# Patient Record
Sex: FEMALE | Race: WHITE | NOT HISPANIC OR LATINO | Employment: OTHER | URBAN - NONMETROPOLITAN AREA
[De-identification: names, ages, dates, MRNs, and addresses within clinical notes are randomized per-mention and may not be internally consistent; named-entity substitution may affect disease eponyms.]

---

## 2017-02-16 ENCOUNTER — OFFICE VISIT (OUTPATIENT)
Dept: CARDIOLOGY | Facility: CLINIC | Age: 77
End: 2017-02-16

## 2017-02-16 VITALS
HEART RATE: 68 BPM | SYSTOLIC BLOOD PRESSURE: 120 MMHG | HEIGHT: 61 IN | WEIGHT: 115 LBS | DIASTOLIC BLOOD PRESSURE: 52 MMHG | BODY MASS INDEX: 21.71 KG/M2

## 2017-02-16 DIAGNOSIS — R01.1 MURMUR, CARDIAC: Primary | ICD-10-CM

## 2017-02-16 DIAGNOSIS — R06.02 SHORTNESS OF BREATH: ICD-10-CM

## 2017-02-16 DIAGNOSIS — I35.0 NONRHEUMATIC AORTIC VALVE STENOSIS: ICD-10-CM

## 2017-02-16 DIAGNOSIS — I44.0 FIRST DEGREE AV BLOCK: ICD-10-CM

## 2017-02-16 DIAGNOSIS — R42 DIZZINESS: ICD-10-CM

## 2017-02-16 DIAGNOSIS — I47.1 PAROXYSMAL SVT (SUPRAVENTRICULAR TACHYCARDIA) (HCC): ICD-10-CM

## 2017-02-16 DIAGNOSIS — Z79.899 LONG TERM CURRENT USE OF ANTIARRHYTHMIC MEDICAL THERAPY: ICD-10-CM

## 2017-02-16 PROCEDURE — 99214 OFFICE O/P EST MOD 30 MIN: CPT | Performed by: NURSE PRACTITIONER

## 2017-02-16 PROCEDURE — 93000 ELECTROCARDIOGRAM COMPLETE: CPT | Performed by: NURSE PRACTITIONER

## 2017-02-16 RX ORDER — RISPERIDONE 3 MG/1
3 TABLET ORAL NIGHTLY
COMMUNITY
End: 2019-01-22 | Stop reason: DRUGHIGH

## 2017-02-16 RX ORDER — OMEGA-3 FATTY ACIDS/FISH OIL 300-1000MG
1 CAPSULE ORAL 2 TIMES DAILY
COMMUNITY

## 2017-02-16 NOTE — PROGRESS NOTES
Chief Complaint   Patient presents with   • Follow-up     Denies chest pain or palpitations. States shes not sure if she has had shortness of breath. See tel encounter- patient states she has not had anymore falls since that time. PCP refills meds. Labs per PCP last month.        Subjective       Debbie Rodrigez is a 76 y.o. female has history of aortic stenosis, history of SVT is now being managed medically. Her last echocardiogram which was done about a year ago showed moderate aortic stenosis, moderate aortic regurgitation. Today she comes to the office for a follow up appointment and denies chest pain or palpitations. She does admit to shortness of breath and mild dizziness, seems to be worse since her last office visit.     HPI         Cardiac History:    Past Surgical History   Procedure Laterality Date   • Hysterectomy     • Appendectomy     •  section     • Eye surgery Bilateral    • Cath lab procedure  2002     Cath- EF 30% Normal Coronaries   • Converted (historical) holter  10/10/2002     Holter- AVG HR 70 BPM   • Cardiovascular stress test  2003     P. Myoview- Normal. Pt had CP   • Echo - converted  09/15/2003     Echo- ([facility]) EF 55%. LBBB. RVSP- 42 mmHg   • Cardiovascular stress test  2013     L. Myoview- Pt had CP. Negative   • Echo - converted  2013     Echo- EF 60%. BRIE- 1.6 mmHg. RVSP- 42 mmHg   • Bigg  2013     BIGG- < 35%, Moderate AS. AI and MR. No Thrombus   • Echo - converted  10/15/2013     Echo-EF 35%, mod. MR, & AI. BRIE- 1.4 Cm2. RVSP- 40 mmHg   • Cardiovascular studies - converted  2014     MUGA- EF 45%   • Echo - converted  2015     Echo- EF 45-50%, mod MR, Mod AS, BRIE 1.1 Cm2, Mod AR, RVSP- 35 mmHg       Current Outpatient Prescriptions   Medication Sig Dispense Refill   • aspirin 81 MG EC tablet Take 81 mg by mouth daily.     • benztropine (COGENTIN) 1 MG tablet Take 1 mg by mouth 2 (two) times a day.     • calcium carbonate (OS-PABLO) 600 MG  tablet Take 600 mg by mouth daily.     • carvedilol (COREG) 6.25 MG tablet Take 6.25 mg by mouth 2 (two) times a day with meals.     • flecainide (TAMBOCOR) 50 MG tablet Take 50 mg by mouth every 12 (twelve) hours.     • furosemide (LASIX) 20 MG tablet Take 20 mg by mouth daily.     • ipratropium (ATROVENT) 0.03 % nasal spray 4 sprays into each nostril 2 (two) times a day.     • levothyroxine (SYNTHROID, LEVOTHROID) 88 MCG tablet Take 88 mcg by mouth daily.     • lisinopril (PRINIVIL,ZESTRIL) 2.5 MG tablet Take 2.5 mg by mouth daily.     • Multiple Vitamin (MULTI VITAMIN DAILY PO) Take  by mouth.     • Omega 3 1000 MG capsule Take  by mouth 2 (Two) Times a Day.     • potassium chloride (K-DUR,KLOR-CON) 10 MEQ CR tablet Take 10 mEq by mouth daily.     • pravastatin (PRAVACHOL) 40 MG tablet Take 40 mg by mouth daily.     • risperiDONE (risperDAL) 3 MG tablet Take 3 mg by mouth Every Night.     • Magnesium Oxide -Mg Supplement 400 MG capsule Once a day 30 capsule 8     No current facility-administered medications for this visit.        Penicillins and Sulfa antibiotics    Past Medical History   Diagnosis Date   • Bipolar affective disorder    • Diabetes mellitus    • CLAYTON (generalized anxiety disorder)    • H/O dilation and curettage    • H/O eye surgery      Eye surgery- bilateral   • H/O:     • H/O: hysterectomy      s/p appendectomy   • Hallucinations    • History of AAA (abdominal aortic aneurysm) repair      (pt doesn't recall- on PCP note)   • Hyperlipidemia    • Hypertension    • Hypothyroidism    • Overactive bladder    • SVT (supraventricular tachycardia)        Social History     Social History   • Marital status:      Spouse name: N/A   • Number of children: N/A   • Years of education: N/A     Occupational History   • Not on file.     Social History Main Topics   • Smoking status: Never Smoker   • Smokeless tobacco: Never Used   • Alcohol use No   • Drug use: No   • Sexual activity: Not on  "file     Other Topics Concern   • Not on file     Social History Narrative       Family History   Problem Relation Age of Onset   • Heart disease Mother    • Cancer Father      Bone       Review of Systems   HENT: Negative.    Respiratory: Positive for shortness of breath.    Cardiovascular: Negative for chest pain, palpitations and leg swelling.   Gastrointestinal: Negative.    Genitourinary: Negative.    Musculoskeletal: Negative.    Neurological: Positive for dizziness and weakness. Negative for syncope, speech difficulty, numbness and headaches.   Psychiatric/Behavioral: Positive for sleep disturbance. The patient is nervous/anxious.        Diabetes- No  Thyroid-abnormal    Objective     Visit Vitals   • /52   • Pulse 68   • Ht 61\" (154.9 cm)   • Wt 115 lb (52.2 kg)   • BMI 21.73 kg/m2       Physical Exam   Constitutional: She is oriented to person, place, and time. Vital signs are normal.   Eyes: Pupils are equal, round, and reactive to light.   Neck: Neck supple.   Cardiovascular: Normal rate, regular rhythm, S1 normal and normal pulses.    Murmur heard.   Crescendo systolic murmur is present with a grade of 4/6   Loud S2   Pulmonary/Chest: Effort normal and breath sounds normal. She has no rales.   Abdominal: Soft. Bowel sounds are normal.   Neurological: She is alert and oriented to person, place, and time.   Skin: Skin is warm and dry.   Psychiatric: She has a normal mood and affect. Her speech is normal and behavior is normal.   Vitals reviewed.      ECG 12 Lead  Date/Time: 2/16/2017 1:26 PM  Performed by: SIENNA LBANCA  Authorized by: SIENNA BLANCA   Comparison: compared with previous ECG from 8/9/2016  Comparison to previous ECG: Sinus first degree AV block and normal QTc  Rhythm: sinus rhythm  BPM: 67  Conduction: 1st degree  Comments:  ms, QTc 498 ms                Assessment/Plan      Debbie was seen today for follow-up.    Diagnoses and all orders for this visit:    Murmur, " cardiac  -     Adult Transthoracic Echo Complete; Future    Nonrheumatic aortic valve stenosis  -     Adult Transthoracic Echo Complete; Future    Paroxysmal SVT (supraventricular tachycardia)  -     ECG 12 Lead    Long term current use of antiarrhythmic medical therapy  -     ECG 12 Lead    First degree AV block  -     ECG 12 Lead    Shortness of breath    Dizziness    Other orders  -     Magnesium Oxide -Mg Supplement 400 MG capsule; Once a day    Ms. Rodrigez's murmur appears quiet loud today. She admits to some change in symptoms including more shortness of breath and dizziness. She agrees to undergo an echocardiogram to evaluate cardiac monitor. EKG shows sinus with first degree AV block and longer QTc being 498 ms. Magnesium supplement added. She follows with you for management of labs. Further recommendations based on echo report.                Electronically signed by TAYO Black,  February 17, 2017 4:07 PM

## 2017-02-21 ENCOUNTER — OUTSIDE FACILITY SERVICE (OUTPATIENT)
Dept: CARDIOLOGY | Facility: CLINIC | Age: 77
End: 2017-02-21

## 2017-02-21 ENCOUNTER — HOSPITAL ENCOUNTER (OUTPATIENT)
Dept: CARDIOLOGY | Facility: HOSPITAL | Age: 77
Discharge: HOME | End: 2017-02-21
Admitting: NURSE PRACTITIONER

## 2017-02-21 DIAGNOSIS — R01.1 MURMUR, CARDIAC: ICD-10-CM

## 2017-02-21 DIAGNOSIS — I35.0 NONRHEUMATIC AORTIC VALVE STENOSIS: ICD-10-CM

## 2017-02-21 PROCEDURE — 93306 TTE W/DOPPLER COMPLETE: CPT

## 2017-02-21 PROCEDURE — 93306 TTE W/DOPPLER COMPLETE: CPT | Performed by: INTERNAL MEDICINE

## 2017-02-23 ENCOUNTER — TELEPHONE (OUTPATIENT)
Dept: CARDIOLOGY | Facility: CLINIC | Age: 77
End: 2017-02-23

## 2017-02-23 DIAGNOSIS — I35.0 NONRHEUMATIC AORTIC VALVE STENOSIS: Primary | ICD-10-CM

## 2017-04-25 ENCOUNTER — OFFICE VISIT (OUTPATIENT)
Dept: CARDIAC SURGERY | Facility: CLINIC | Age: 77
End: 2017-04-25

## 2017-04-25 VITALS
BODY MASS INDEX: 21.71 KG/M2 | SYSTOLIC BLOOD PRESSURE: 133 MMHG | DIASTOLIC BLOOD PRESSURE: 64 MMHG | HEART RATE: 71 BPM | WEIGHT: 115 LBS | HEIGHT: 61 IN

## 2017-04-25 DIAGNOSIS — I35.9 AORTIC VALVE DISORDER: Primary | ICD-10-CM

## 2017-04-25 DIAGNOSIS — R55 SYNCOPE AND COLLAPSE: ICD-10-CM

## 2017-04-25 PROCEDURE — 99203 OFFICE O/P NEW LOW 30 MIN: CPT | Performed by: THORACIC SURGERY (CARDIOTHORACIC VASCULAR SURGERY)

## 2017-08-28 ENCOUNTER — OFFICE VISIT (OUTPATIENT)
Dept: CARDIOLOGY | Facility: CLINIC | Age: 77
End: 2017-08-28

## 2017-08-28 ENCOUNTER — TELEPHONE (OUTPATIENT)
Dept: CARDIOLOGY | Facility: CLINIC | Age: 77
End: 2017-08-28

## 2017-08-28 ENCOUNTER — TELEPHONE (OUTPATIENT)
Dept: CARDIAC SURGERY | Facility: CLINIC | Age: 77
End: 2017-08-28

## 2017-08-28 VITALS
WEIGHT: 118 LBS | SYSTOLIC BLOOD PRESSURE: 122 MMHG | HEART RATE: 80 BPM | DIASTOLIC BLOOD PRESSURE: 58 MMHG | HEIGHT: 61 IN | BODY MASS INDEX: 22.28 KG/M2

## 2017-08-28 DIAGNOSIS — I35.9 AORTIC VALVE DISORDER: Primary | ICD-10-CM

## 2017-08-28 DIAGNOSIS — I10 ESSENTIAL HYPERTENSION: ICD-10-CM

## 2017-08-28 DIAGNOSIS — I35.0 NONRHEUMATIC AORTIC VALVE STENOSIS: ICD-10-CM

## 2017-08-28 DIAGNOSIS — R01.1 MURMUR: ICD-10-CM

## 2017-08-28 DIAGNOSIS — I47.1 SVT (SUPRAVENTRICULAR TACHYCARDIA) (HCC): ICD-10-CM

## 2017-08-28 DIAGNOSIS — E03.8 OTHER SPECIFIED HYPOTHYROIDISM: ICD-10-CM

## 2017-08-28 PROBLEM — I47.10 SVT (SUPRAVENTRICULAR TACHYCARDIA): Status: ACTIVE | Noted: 2017-08-28

## 2017-08-28 PROBLEM — E03.9 HYPOTHYROID: Status: ACTIVE | Noted: 2017-08-28

## 2017-08-28 PROCEDURE — 99214 OFFICE O/P EST MOD 30 MIN: CPT | Performed by: NURSE PRACTITIONER

## 2017-08-28 NOTE — TELEPHONE ENCOUNTER
PT'S PCP CALLED STATING THAT THE PT WAS WANTING TO KNOW WHEN SHE WAS TO COME IN FOR HER NEXT APPT WITH DR. KEITH. I LET THEM KNOW WE SEND OUT RECALL LETTERS FOR THE PTS TO CALL US TO SCHEDULE APPT'S. 6M F/U WITH DR. KEITH. VERIFIED PT'S ADDRESS, PHONE NUMBER AND INSURANCE WITH PCP

## 2017-08-28 NOTE — TELEPHONE ENCOUNTER
Per Marta- patient was supposed to have follow up in August with Dr. Darling but patient did not have appointment set up yet. I called Dr. Darling's office and spoke to Dimitri who said she would send it to a  and have someone call Mrs. Rodrigez to set up appointment.

## 2017-08-28 NOTE — PROGRESS NOTES
Chief Complaint   Patient presents with   • Follow-up     Patient saw Dr. Darling for aortic stenosis. Patient was to follow up with him in August but I do not see a follow up scheduled. PCP refills meds. Labs per PCP about a month ago.    • Dizziness     About the same as before.        Cardiac Complaints  none      Subjective   Debbie Rodrigez is a 76 y.o. female with  aortic stenosis, HTN, hyperlipidemia, and SVT being managed medically. Her last echocardiogram which was done in 2017 showed severe aortic stenosis with BRIE of 1.0. Today she comes to the office for a follow up appointment and denies chest pain, palpitations, shortness of breath, or dizziness.  She states that labs and refills are done with PCP, most recent were done the  and they think most recent looked okay.  She did see Dr. Darling in April for aortic stenosis and repeat appointment was supposed to be this month.  Patient reports she has not followed with Dr. Darling yet and is unsure of any appointment this month.        Cardiac History  Past Surgical History:   Procedure Laterality Date   • APPENDECTOMY     • CARDIOVASCULAR STRESS TEST  2003    P. Myoview- Normal. Pt had CP   • CARDIOVASCULAR STRESS TEST  2013    L. Myoview- Pt had CP. Negative   • CATH LAB PROCEDURE  2002    Cath- EF 30% Normal Coronaries   •  SECTION     • CONVERTED (HISTORICAL) CARDIOVASCULAR STUDIES  2014    MUGA- EF 45%   • CONVERTED (HISTORICAL) HOLTER  10/10/2002    Holter- AVG HR 70 BPM   • ECHO - CONVERTED  09/15/2003    Echo- ([facility]) EF 55%. LBBB. RVSP- 42 mmHg   • ECHO - CONVERTED  2013    Echo- EF 60%. BRIE- 1.6 mmHg. RVSP- 42 mmHg   • ECHO - CONVERTED  10/15/2013    Echo-EF 35%, mod. MR, & AI. BRIE- 1.4 Cm2. RVSP- 40 mmHg   • ECHO - CONVERTED  2015    Echo- EF 45-50%, mod MR, Mod AS, BRIE 1.1 Cm2, Mod AR, RVSP- 35 mmHg   • ECHO - CONVERTED  2017    EF 55-60%, BRIE 1.0   • EYE SURGERY Bilateral    •  HYSTERECTOMY     • MARYSE  2013    MARYSE- < 35%, Moderate AS. AI and MR. No Thrombus       Current Outpatient Prescriptions   Medication Sig Dispense Refill   • aspirin 81 MG EC tablet Take 81 mg by mouth daily.     • benztropine (COGENTIN) 1 MG tablet Take 1 mg by mouth 2 (two) times a day.     • calcium carbonate (OS-PABLO) 600 MG tablet Take 600 mg by mouth daily.     • carvedilol (COREG) 6.25 MG tablet Take 6.25 mg by mouth 2 (two) times a day with meals.     • flecainide (TAMBOCOR) 50 MG tablet Take 50 mg by mouth every 12 (twelve) hours.     • furosemide (LASIX) 20 MG tablet Take 20 mg by mouth daily.     • ipratropium (ATROVENT) 0.03 % nasal spray 4 sprays into each nostril 2 (two) times a day.     • levothyroxine (SYNTHROID, LEVOTHROID) 88 MCG tablet Take 88 mcg by mouth daily.     • lisinopril (PRINIVIL,ZESTRIL) 2.5 MG tablet Take 2.5 mg by mouth daily.     • Magnesium Oxide -Mg Supplement 400 MG capsule Once a day 30 capsule 8   • Multiple Vitamin (MULTI VITAMIN DAILY PO) Take  by mouth.     • Omega 3 1000 MG capsule Take  by mouth 2 (Two) Times a Day.     • potassium chloride (K-DUR,KLOR-CON) 10 MEQ CR tablet Take 10 mEq by mouth daily.     • pravastatin (PRAVACHOL) 40 MG tablet Take 40 mg by mouth daily.     • risperiDONE (risperDAL) 3 MG tablet Take 3 mg by mouth Every Night.       No current facility-administered medications for this visit.        Penicillins and Sulfa antibiotics    Past Medical History:   Diagnosis Date   • Bipolar affective disorder    • Diabetes mellitus    • CLAYTON (generalized anxiety disorder)    • H/O dilation and curettage    • H/O eye surgery     Eye surgery- bilateral   • H/O:     • H/O: hysterectomy     s/p appendectomy   • Hallucinations    • History of AAA (abdominal aortic aneurysm) repair     (pt doesn't recall- on PCP note)   • Hyperlipidemia    • Hypertension    • Hypothyroidism    • Overactive bladder    • SVT (supraventricular tachycardia)        Social History  "    Social History   • Marital status:      Spouse name: N/A   • Number of children: 1   • Years of education: N/A     Occupational History   • RETIRED       Social History Main Topics   • Smoking status: Never Smoker   • Smokeless tobacco: Never Used   • Alcohol use No   • Drug use: No   • Sexual activity: Not on file     Other Topics Concern   • Not on file     Social History Narrative       Family History   Problem Relation Age of Onset   • Heart disease Mother    • Diabetes Mother    • Cancer Father      Bone       Review of Systems   Constitution: Negative for weakness and malaise/fatigue.   Cardiovascular: Negative for chest pain, dyspnea on exertion, leg swelling and palpitations.   Respiratory: Negative for shortness of breath and wheezing.    Musculoskeletal: Negative for arthritis and back pain.   Gastrointestinal: Negative for anorexia, dysphagia, heartburn, nausea and vomiting.   Genitourinary: Negative for dysuria, hematuria and nocturia.   Neurological: Negative for dizziness, focal weakness and light-headedness.   Psychiatric/Behavioral: Negative for altered mental status and depression.       DiabetesNo  Thyroidnormal    Objective     /58  Pulse 80  Ht 61\" (154.9 cm)  Wt 118 lb (53.5 kg)  BMI 22.3 kg/m2    Physical Exam   Constitutional: She is oriented to person, place, and time. She appears well-developed and well-nourished.   HENT:   Head: Normocephalic and atraumatic.   Eyes: EOM are normal. Pupils are equal, round, and reactive to light.   Neck: Normal range of motion. Neck supple.   Cardiovascular: Normal rate and regular rhythm.    Murmur heard.  Pulmonary/Chest: Effort normal and breath sounds normal.   Abdominal: Soft.   Musculoskeletal: Normal range of motion.   Neurological: She is alert and oriented to person, place, and time.   Skin: Skin is warm and dry.   Psychiatric: She has a normal mood and affect. Her behavior is normal. "       Procedures    Assessment/Plan     HR and BP are stable today.  No changes to current regimen will be advised.  Dr. Darling recommended repeat echo in August, and order for echo to re-evaluate BRIE area will be provided.  More recommendations to follow.  We will discuss with his office repeat follow up appointment as she did not see Dr. Darling in August per his last office note recommendation, we will have his office to call patient for return appointment.  Labs and refills are done with your office.  No recent lab copy available to me.  She states most recent looked okay.  Good cardiac diet with limited sodium intake advised as well as activity as tolerated.  6 month follow up advised or sooner if needed.    Problems Addressed this Visit        Cardiovascular and Mediastinum    Aortic valve disorder - Primary    Relevant Orders    Adult Transthoracic Echo Complete    AS (aortic stenosis)    SVT (supraventricular tachycardia)    HTN (hypertension)       Endocrine    Hypothyroid      Other Visit Diagnoses     Murmur        Relevant Orders    Adult Transthoracic Echo Complete                  Electronically signed by TAYO Velazquez August 28, 2017 4:30 PM

## 2017-08-30 ENCOUNTER — OUTSIDE FACILITY SERVICE (OUTPATIENT)
Dept: CARDIOLOGY | Facility: CLINIC | Age: 77
End: 2017-08-30

## 2017-08-30 ENCOUNTER — HOSPITAL ENCOUNTER (OUTPATIENT)
Dept: CARDIOLOGY | Facility: HOSPITAL | Age: 77
Discharge: HOME | End: 2017-08-30

## 2017-08-30 DIAGNOSIS — R01.1 MURMUR: ICD-10-CM

## 2017-08-30 DIAGNOSIS — I35.9 AORTIC VALVE DISORDER: ICD-10-CM

## 2017-08-30 PROCEDURE — 93306 TTE W/DOPPLER COMPLETE: CPT

## 2017-08-30 PROCEDURE — 93306 TTE W/DOPPLER COMPLETE: CPT | Performed by: INTERNAL MEDICINE

## 2017-09-26 ENCOUNTER — OFFICE VISIT (OUTPATIENT)
Dept: CARDIAC SURGERY | Facility: CLINIC | Age: 77
End: 2017-09-26

## 2017-09-26 VITALS
HEART RATE: 71 BPM | WEIGHT: 117.4 LBS | BODY MASS INDEX: 22.16 KG/M2 | HEIGHT: 61 IN | DIASTOLIC BLOOD PRESSURE: 78 MMHG | SYSTOLIC BLOOD PRESSURE: 128 MMHG

## 2017-09-26 DIAGNOSIS — I35.0 NONRHEUMATIC AORTIC VALVE STENOSIS: Primary | ICD-10-CM

## 2017-09-26 PROCEDURE — 99214 OFFICE O/P EST MOD 30 MIN: CPT | Performed by: THORACIC SURGERY (CARDIOTHORACIC VASCULAR SURGERY)

## 2017-09-26 RX ORDER — LANCETS
EACH MISCELLANEOUS
COMMUNITY
End: 2017-11-09

## 2017-09-27 ENCOUNTER — TELEPHONE (OUTPATIENT)
Dept: CARDIOLOGY | Facility: CLINIC | Age: 77
End: 2017-09-27

## 2017-09-27 NOTE — TELEPHONE ENCOUNTER
Patient seen Dr. Darling yesterday.  Note in chart.    Dr. Darling recommending for patient to have cardiac catheterization secondary to chest pain.  They are planning on scheduling AVR after cardiac cath.

## 2017-10-20 ENCOUNTER — OUTSIDE FACILITY SERVICE (OUTPATIENT)
Dept: CARDIOLOGY | Facility: CLINIC | Age: 77
End: 2017-10-20

## 2017-10-20 PROCEDURE — 93456 R HRT CORONARY ARTERY ANGIO: CPT | Performed by: INTERNAL MEDICINE

## 2017-10-20 PROCEDURE — 93567 NJX CAR CTH SPRVLV AORTGRPHY: CPT | Performed by: INTERNAL MEDICINE

## 2017-10-24 ENCOUNTER — HOSPITAL ENCOUNTER (OUTPATIENT)
Dept: CARDIOLOGY | Facility: HOSPITAL | Age: 77
Discharge: HOME | End: 2017-10-24
Attending: THORACIC SURGERY (CARDIOTHORACIC VASCULAR SURGERY)

## 2017-10-24 DIAGNOSIS — Z98.890 HISTORY OF LEFT HEART CATHETERIZATION (LHC): ICD-10-CM

## 2017-10-24 PROBLEM — I35.0 NONRHEUMATIC AORTIC VALVE STENOSIS: Status: ACTIVE | Noted: 2017-10-24

## 2017-10-30 ENCOUNTER — PREP FOR SURGERY (OUTPATIENT)
Dept: OTHER | Facility: HOSPITAL | Age: 77
End: 2017-10-30

## 2017-10-30 DIAGNOSIS — I35.9 AORTIC VALVE DISORDER: Primary | ICD-10-CM

## 2017-10-30 RX ORDER — CHLORHEXIDINE GLUCONATE 500 MG/1
1 CLOTH TOPICAL EVERY 12 HOURS PRN
Status: CANCELLED | OUTPATIENT
Start: 2017-11-09

## 2017-10-30 RX ORDER — CHLORHEXIDINE GLUCONATE 500 MG/1
1 CLOTH TOPICAL EVERY 12 HOURS PRN
Status: CANCELLED | OUTPATIENT
Start: 2017-11-08

## 2017-10-30 RX ORDER — CHLORHEXIDINE GLUCONATE 0.12 MG/ML
15 RINSE ORAL ONCE
Status: CANCELLED | OUTPATIENT
Start: 2017-11-09 | End: 2017-11-09

## 2017-10-30 RX ORDER — ASPIRIN 325 MG
325 TABLET ORAL NIGHTLY
Status: CANCELLED | OUTPATIENT
Start: 2017-11-08 | End: 2017-11-09

## 2017-10-30 RX ORDER — NITROGLYCERIN 0.4 MG/1
0.4 TABLET SUBLINGUAL
Status: CANCELLED | OUTPATIENT
Start: 2017-11-09

## 2017-10-30 RX ORDER — ACETAMINOPHEN 325 MG/1
650 TABLET ORAL EVERY 4 HOURS PRN
Status: CANCELLED | OUTPATIENT
Start: 2017-11-09

## 2017-11-07 ENCOUNTER — TELEPHONE (OUTPATIENT)
Dept: CARDIAC SURGERY | Facility: CLINIC | Age: 77
End: 2017-11-07

## 2017-11-07 NOTE — TELEPHONE ENCOUNTER
"PATIENT CALLED TODAY RETURNING OK'S CALL. I INFORMED THE PATIENT THAT THE REASON FOR OUR PHONE CALL WAS TO INFORM THEM THAT SURGERY HAD BEEN POSTPONED UNTIL 11/16. UPON TELLING PT THIS, HER  GOT ON THE PHONE AND SAID \"WELL THAT'S JUST NOT GOING TO WORK.\" MR. ACOSTA STATED THAT HIS DAUGHTER HAS TO TAKE OFF FROM WORK TO BRING MRS. ACOSTA HERE AND SHE WILL NOT BE ABLE TO DO THAT ON THE 15/16TH.  ADVISED THE PT AND HER  THAT THEY COULD CALL AND SPEAK WITH OK IN THE MORNING TO FURTHER DISCUSS FINDING A DATE THAT WORKS BETTER FOR THEIR FAMILY.   "

## 2017-11-09 ENCOUNTER — HOSPITAL ENCOUNTER (OUTPATIENT)
Dept: PULMONOLOGY | Facility: HOSPITAL | Age: 77
Discharge: HOME | End: 2017-11-09

## 2017-11-09 ENCOUNTER — APPOINTMENT (OUTPATIENT)
Dept: PREADMISSION TESTING | Facility: HOSPITAL | Age: 77
End: 2017-11-09

## 2017-11-09 ENCOUNTER — HOSPITAL ENCOUNTER (OUTPATIENT)
Dept: GENERAL RADIOLOGY | Facility: HOSPITAL | Age: 77
Discharge: HOME | End: 2017-11-09
Admitting: PHYSICIAN ASSISTANT

## 2017-11-09 VITALS — BODY MASS INDEX: 22.09 KG/M2 | WEIGHT: 117 LBS | OXYGEN SATURATION: 97 % | HEIGHT: 61 IN

## 2017-11-09 DIAGNOSIS — I35.9 AORTIC VALVE DISORDER: ICD-10-CM

## 2017-11-09 LAB
ABO GROUP BLD: NORMAL
ALBUMIN SERPL-MCNC: 4.1 G/DL (ref 3.2–4.8)
ALBUMIN/GLOB SERPL: 1.6 G/DL (ref 1.5–2.5)
ALP SERPL-CCNC: 59 U/L (ref 25–100)
ALT SERPL W P-5'-P-CCNC: 18 U/L (ref 7–40)
AMPHET+METHAMPHET UR QL: NEGATIVE
AMPHETAMINES UR QL: NEGATIVE
ANION GAP SERPL CALCULATED.3IONS-SCNC: 9 MMOL/L (ref 3–11)
APTT PPP: 25.3 SECONDS (ref 24–31)
AST SERPL-CCNC: 25 U/L (ref 0–33)
BARBITURATES UR QL SCN: NEGATIVE
BASOPHILS # BLD AUTO: 0.02 10*3/MM3 (ref 0–0.2)
BASOPHILS NFR BLD AUTO: 0.3 % (ref 0–1)
BENZODIAZ UR QL SCN: NEGATIVE
BILIRUB SERPL-MCNC: 0.6 MG/DL (ref 0.3–1.2)
BLD GP AB SCN SERPL QL: NEGATIVE
BUN BLD-MCNC: 26 MG/DL (ref 9–23)
BUN/CREAT SERPL: 23.6 (ref 7–25)
BUPRENORPHINE SERPL-MCNC: NEGATIVE NG/ML
CALCIUM SPEC-SCNC: 9.7 MG/DL (ref 8.7–10.4)
CANNABINOIDS SERPL QL: NEGATIVE
CHLORIDE SERPL-SCNC: 106 MMOL/L (ref 99–109)
CO2 SERPL-SCNC: 28 MMOL/L (ref 20–31)
COCAINE UR QL: NEGATIVE
CREAT BLD-MCNC: 1.1 MG/DL (ref 0.6–1.3)
DEPRECATED RDW RBC AUTO: 46.2 FL (ref 37–54)
EOSINOPHIL # BLD AUTO: 0.42 10*3/MM3 (ref 0–0.3)
EOSINOPHIL NFR BLD AUTO: 6.5 % (ref 0–3)
ERYTHROCYTE [DISTWIDTH] IN BLOOD BY AUTOMATED COUNT: 13.3 % (ref 11.3–14.5)
GFR SERPL CREATININE-BSD FRML MDRD: 48 ML/MIN/1.73
GLOBULIN UR ELPH-MCNC: 2.5 GM/DL
GLUCOSE BLD-MCNC: 96 MG/DL (ref 70–100)
HBA1C MFR BLD: 5.6 % (ref 4.8–5.6)
HCT VFR BLD AUTO: 38.9 % (ref 34.5–44)
HGB BLD-MCNC: 12.8 G/DL (ref 11.5–15.5)
IMM GRANULOCYTES # BLD: 0 10*3/MM3 (ref 0–0.03)
IMM GRANULOCYTES NFR BLD: 0 % (ref 0–0.6)
INR PPP: 1.01
LYMPHOCYTES # BLD AUTO: 2.12 10*3/MM3 (ref 0.6–4.8)
LYMPHOCYTES NFR BLD AUTO: 32.7 % (ref 24–44)
MAGNESIUM SERPL-MCNC: 1.9 MG/DL (ref 1.3–2.7)
MCH RBC QN AUTO: 31.4 PG (ref 27–31)
MCHC RBC AUTO-ENTMCNC: 32.9 G/DL (ref 32–36)
MCV RBC AUTO: 95.6 FL (ref 80–99)
METHADONE UR QL SCN: NEGATIVE
MONOCYTES # BLD AUTO: 0.53 10*3/MM3 (ref 0–1)
MONOCYTES NFR BLD AUTO: 8.2 % (ref 0–12)
NEUTROPHILS # BLD AUTO: 3.4 10*3/MM3 (ref 1.5–8.3)
NEUTROPHILS NFR BLD AUTO: 52.3 % (ref 41–71)
OPIATES UR QL: NEGATIVE
OXYCODONE UR QL SCN: NEGATIVE
PA ADP PRP-ACNC: 276 PRU
PCP UR QL SCN: NEGATIVE
PLATELET # BLD AUTO: 194 10*3/MM3 (ref 150–450)
PMV BLD AUTO: 9.1 FL (ref 6–12)
POTASSIUM BLD-SCNC: 4.6 MMOL/L (ref 3.5–5.5)
PROPOXYPH UR QL: NEGATIVE
PROT SERPL-MCNC: 6.6 G/DL (ref 5.7–8.2)
PROTHROMBIN TIME: 11 SECONDS (ref 9.6–11.5)
RBC # BLD AUTO: 4.07 10*6/MM3 (ref 3.89–5.14)
RH BLD: NEGATIVE
SODIUM BLD-SCNC: 143 MMOL/L (ref 132–146)
TRICYCLICS UR QL SCN: NEGATIVE
WBC NRBC COR # BLD: 6.49 10*3/MM3 (ref 3.5–10.8)

## 2017-11-09 PROCEDURE — 80306 DRUG TEST PRSMV INSTRMNT: CPT | Performed by: PHYSICIAN ASSISTANT

## 2017-11-09 PROCEDURE — 93010 ELECTROCARDIOGRAM REPORT: CPT | Performed by: INTERNAL MEDICINE

## 2017-11-09 PROCEDURE — 83735 ASSAY OF MAGNESIUM: CPT | Performed by: PHYSICIAN ASSISTANT

## 2017-11-09 PROCEDURE — 86923 COMPATIBILITY TEST ELECTRIC: CPT

## 2017-11-09 PROCEDURE — 86900 BLOOD TYPING SEROLOGIC ABO: CPT | Performed by: PHYSICIAN ASSISTANT

## 2017-11-09 PROCEDURE — 71020 HC CHEST PA AND LATERAL: CPT

## 2017-11-09 PROCEDURE — 94010 BREATHING CAPACITY TEST: CPT

## 2017-11-09 PROCEDURE — 85025 COMPLETE CBC W/AUTO DIFF WBC: CPT | Performed by: PHYSICIAN ASSISTANT

## 2017-11-09 PROCEDURE — 85730 THROMBOPLASTIN TIME PARTIAL: CPT | Performed by: PHYSICIAN ASSISTANT

## 2017-11-09 PROCEDURE — 83036 HEMOGLOBIN GLYCOSYLATED A1C: CPT | Performed by: PHYSICIAN ASSISTANT

## 2017-11-09 PROCEDURE — 80053 COMPREHEN METABOLIC PANEL: CPT | Performed by: PHYSICIAN ASSISTANT

## 2017-11-09 PROCEDURE — 86850 RBC ANTIBODY SCREEN: CPT | Performed by: PHYSICIAN ASSISTANT

## 2017-11-09 PROCEDURE — 85576 BLOOD PLATELET AGGREGATION: CPT | Performed by: PHYSICIAN ASSISTANT

## 2017-11-09 PROCEDURE — 85610 PROTHROMBIN TIME: CPT | Performed by: PHYSICIAN ASSISTANT

## 2017-11-09 PROCEDURE — 86901 BLOOD TYPING SEROLOGIC RH(D): CPT | Performed by: PHYSICIAN ASSISTANT

## 2017-11-09 PROCEDURE — 94010 BREATHING CAPACITY TEST: CPT | Performed by: INTERNAL MEDICINE

## 2017-11-09 PROCEDURE — 93005 ELECTROCARDIOGRAM TRACING: CPT

## 2017-11-09 PROCEDURE — 36415 COLL VENOUS BLD VENIPUNCTURE: CPT

## 2017-11-09 RX ORDER — ASCORBIC ACID 500 MG
500 TABLET ORAL DAILY
COMMUNITY

## 2017-11-09 RX ORDER — CETIRIZINE HYDROCHLORIDE 10 MG/1
10 TABLET ORAL DAILY
Status: ON HOLD | COMMUNITY
End: 2018-09-07

## 2017-11-10 ENCOUNTER — ANESTHESIA EVENT (OUTPATIENT)
Dept: PERIOP | Facility: HOSPITAL | Age: 77
End: 2017-11-10

## 2017-11-10 ENCOUNTER — ANESTHESIA (OUTPATIENT)
Dept: PERIOP | Facility: HOSPITAL | Age: 77
End: 2017-11-10

## 2017-11-10 ENCOUNTER — APPOINTMENT (OUTPATIENT)
Dept: GENERAL RADIOLOGY | Facility: HOSPITAL | Age: 77
End: 2017-11-10

## 2017-11-10 ENCOUNTER — HOSPITAL ENCOUNTER (INPATIENT)
Facility: HOSPITAL | Age: 77
LOS: 11 days | Discharge: NURSING, REHAB OR LONG-TERM CARE | End: 2017-11-21
Attending: THORACIC SURGERY (CARDIOTHORACIC VASCULAR SURGERY) | Admitting: THORACIC SURGERY (CARDIOTHORACIC VASCULAR SURGERY)

## 2017-11-10 ENCOUNTER — APPOINTMENT (OUTPATIENT)
Dept: PERIOP | Facility: HOSPITAL | Age: 77
End: 2017-11-10

## 2017-11-10 DIAGNOSIS — Z74.09 IMPAIRED FUNCTIONAL MOBILITY, BALANCE, GAIT, AND ENDURANCE: Primary | ICD-10-CM

## 2017-11-10 DIAGNOSIS — I35.9 AORTIC VALVE DISORDER: ICD-10-CM

## 2017-11-10 DIAGNOSIS — I35.0 NONRHEUMATIC AORTIC VALVE STENOSIS: ICD-10-CM

## 2017-11-10 PROBLEM — F31.9 BIPOLAR AFFECTIVE DISORDER: Status: ACTIVE | Noted: 2017-11-10

## 2017-11-10 LAB
ABO GROUP BLD: NORMAL
ACT BLD: 125 SECONDS (ref 82–152)
ACT BLD: 125 SECONDS (ref 82–152)
ACT BLD: 131 SECONDS (ref 82–152)
ACT BLD: 637 SECONDS (ref 82–152)
ACT BLD: 648 SECONDS (ref 82–152)
ACT BLD: 730 SECONDS (ref 82–152)
ACT BLD: 945 SECONDS (ref 82–152)
ACT BLD: 967 SECONDS (ref 82–152)
ALBUMIN SERPL-MCNC: 2.7 G/DL (ref 3.2–4.8)
ALBUMIN SERPL-MCNC: 3.1 G/DL (ref 3.2–4.8)
ANION GAP SERPL CALCULATED.3IONS-SCNC: 1 MMOL/L (ref 3–11)
ANION GAP SERPL CALCULATED.3IONS-SCNC: 5 MMOL/L (ref 3–11)
APTT PPP: 42.6 SECONDS (ref 24–31)
APTT PPP: 51.9 SECONDS (ref 24–31)
ARTERIAL PATENCY WRIST A: POSITIVE
ATMOSPHERIC PRESS: ABNORMAL MMHG
BACTERIA UR QL AUTO: ABNORMAL /HPF
BASE EXCESS BLDA CALC-SCNC: -2.7 MMOL/L (ref 0–2)
BASE EXCESS BLDA CALC-SCNC: -3 MMOL/L (ref -5–5)
BASE EXCESS BLDA CALC-SCNC: 1 MMOL/L (ref -5–5)
BASE EXCESS BLDA CALC-SCNC: 2 MMOL/L (ref -5–5)
BASE EXCESS BLDA CALC-SCNC: 2 MMOL/L (ref -5–5)
BASE EXCESS BLDA CALC-SCNC: 3 MMOL/L (ref -5–5)
BASE EXCESS BLDA CALC-SCNC: 3 MMOL/L (ref -5–5)
BASE EXCESS BLDA CALC-SCNC: 4 MMOL/L (ref -5–5)
BASE EXCESS BLDA CALC-SCNC: 5 MMOL/L (ref -5–5)
BDY SITE: ABNORMAL
BILIRUB UR QL STRIP: NEGATIVE
BUN BLD-MCNC: 19 MG/DL (ref 9–23)
BUN BLD-MCNC: 21 MG/DL (ref 9–23)
BUN/CREAT SERPL: 21.1 (ref 7–25)
BUN/CREAT SERPL: 30 (ref 7–25)
CA-I BLDA-SCNC: 1 MMOL/L (ref 1.2–1.32)
CA-I BLDA-SCNC: 1.01 MMOL/L (ref 1.2–1.32)
CA-I BLDA-SCNC: 1.04 MMOL/L (ref 1.2–1.32)
CA-I BLDA-SCNC: 1.07 MMOL/L (ref 1.2–1.32)
CA-I BLDA-SCNC: 1.15 MMOL/L (ref 1.2–1.32)
CA-I BLDA-SCNC: 1.25 MMOL/L (ref 1.2–1.32)
CA-I BLDA-SCNC: 1.28 MMOL/L (ref 1.2–1.32)
CA-I BLDA-SCNC: 1.45 MMOL/L (ref 1.2–1.32)
CA-I SERPL ISE-MCNC: 1.25 MMOL/L (ref 1.12–1.32)
CALCIUM SPEC-SCNC: 7.3 MG/DL (ref 8.7–10.4)
CALCIUM SPEC-SCNC: 7.8 MG/DL (ref 8.7–10.4)
CHLORIDE SERPL-SCNC: 114 MMOL/L (ref 99–109)
CHLORIDE SERPL-SCNC: 117 MMOL/L (ref 99–109)
CLARITY UR: CLEAR
CO2 BLDA-SCNC: 21.7 MMOL/L (ref 22–33)
CO2 BLDA-SCNC: 22 MMOL/L (ref 24–29)
CO2 BLDA-SCNC: 26 MMOL/L (ref 24–29)
CO2 BLDA-SCNC: 27 MMOL/L (ref 24–29)
CO2 BLDA-SCNC: 28 MMOL/L (ref 24–29)
CO2 BLDA-SCNC: 29 MMOL/L (ref 24–29)
CO2 SERPL-SCNC: 22 MMOL/L (ref 20–31)
CO2 SERPL-SCNC: 22 MMOL/L (ref 20–31)
COHGB MFR BLD: 0.9 % (ref 0–2)
COLOR UR: YELLOW
CREAT BLD-MCNC: 0.7 MG/DL (ref 0.6–1.3)
CREAT BLD-MCNC: 0.9 MG/DL (ref 0.6–1.3)
DEPRECATED RDW RBC AUTO: 45.1 FL (ref 37–54)
DEPRECATED RDW RBC AUTO: 45.7 FL (ref 37–54)
ERYTHROCYTE [DISTWIDTH] IN BLOOD BY AUTOMATED COUNT: 13.3 % (ref 11.3–14.5)
ERYTHROCYTE [DISTWIDTH] IN BLOOD BY AUTOMATED COUNT: 13.4 % (ref 11.3–14.5)
FIBRINOGEN PPP-MCNC: 113 MG/DL (ref 200–400)
FSP PPP LA-ACNC: NORMAL
GFR SERPL CREATININE-BSD FRML MDRD: 61 ML/MIN/1.73
GFR SERPL CREATININE-BSD FRML MDRD: 82 ML/MIN/1.73
GLUCOSE BLD-MCNC: 114 MG/DL (ref 70–100)
GLUCOSE BLD-MCNC: 71 MG/DL (ref 70–100)
GLUCOSE BLDC GLUCOMTR-MCNC: 104 MG/DL (ref 70–130)
GLUCOSE BLDC GLUCOMTR-MCNC: 120 MG/DL (ref 70–130)
GLUCOSE BLDC GLUCOMTR-MCNC: 134 MG/DL (ref 70–130)
GLUCOSE BLDC GLUCOMTR-MCNC: 155 MG/DL (ref 70–130)
GLUCOSE BLDC GLUCOMTR-MCNC: 81 MG/DL (ref 70–130)
GLUCOSE BLDC GLUCOMTR-MCNC: 91 MG/DL (ref 70–130)
GLUCOSE BLDC GLUCOMTR-MCNC: 93 MG/DL (ref 70–130)
GLUCOSE BLDC GLUCOMTR-MCNC: 95 MG/DL (ref 70–130)
GLUCOSE UR STRIP-MCNC: NEGATIVE MG/DL
HCO3 BLDA-SCNC: 20.8 MMOL/L (ref 20–26)
HCO3 BLDA-SCNC: 21.3 MMOL/L (ref 22–26)
HCO3 BLDA-SCNC: 24.8 MMOL/L (ref 22–26)
HCO3 BLDA-SCNC: 25.5 MMOL/L (ref 22–26)
HCO3 BLDA-SCNC: 25.6 MMOL/L (ref 22–26)
HCO3 BLDA-SCNC: 26 MMOL/L (ref 22–26)
HCO3 BLDA-SCNC: 26.2 MMOL/L (ref 22–26)
HCO3 BLDA-SCNC: 27 MMOL/L (ref 22–26)
HCO3 BLDA-SCNC: 28.2 MMOL/L (ref 22–26)
HCT VFR BLD AUTO: 20 % (ref 34.5–44)
HCT VFR BLD AUTO: 20.3 % (ref 34.5–44)
HCT VFR BLD CALC: 19.4 %
HCT VFR BLDA CALC: 19 % (ref 38–51)
HCT VFR BLDA CALC: 20 % (ref 38–51)
HCT VFR BLDA CALC: 21 % (ref 38–51)
HCT VFR BLDA CALC: 27 % (ref 38–51)
HCT VFR BLDA CALC: 32 % (ref 38–51)
HGB BLD-MCNC: 6.6 G/DL (ref 11.5–15.5)
HGB BLD-MCNC: 6.7 G/DL (ref 11.5–15.5)
HGB BLDA-MCNC: 10.9 G/DL (ref 12–17)
HGB BLDA-MCNC: 6.3 G/DL (ref 14–18)
HGB BLDA-MCNC: 6.5 G/DL (ref 12–17)
HGB BLDA-MCNC: 6.8 G/DL (ref 12–17)
HGB BLDA-MCNC: 7.1 G/DL (ref 12–17)
HGB BLDA-MCNC: 9.2 G/DL (ref 12–17)
HGB UR QL STRIP.AUTO: ABNORMAL
HOROWITZ INDEX BLD+IHG-RTO: 100 %
HYALINE CASTS UR QL AUTO: ABNORMAL /LPF
INR PPP: 1.35
INR PPP: 1.47
KETONES UR QL STRIP: NEGATIVE
LEUKOCYTE ESTERASE UR QL STRIP.AUTO: ABNORMAL
MAGNESIUM SERPL-MCNC: 2.4 MG/DL (ref 1.3–2.7)
MAGNESIUM SERPL-MCNC: 2.9 MG/DL (ref 1.3–2.7)
MCH RBC QN AUTO: 30.7 PG (ref 27–31)
MCH RBC QN AUTO: 31.1 PG (ref 27–31)
MCHC RBC AUTO-ENTMCNC: 33 G/DL (ref 32–36)
MCHC RBC AUTO-ENTMCNC: 33 G/DL (ref 32–36)
MCV RBC AUTO: 93.1 FL (ref 80–99)
MCV RBC AUTO: 94.3 FL (ref 80–99)
METHGB BLD QL: 1.7 % (ref 0–1.5)
MODALITY: ABNORMAL
NITRITE UR QL STRIP: NEGATIVE
OXYHGB MFR BLDV: 97.7 % (ref 94–99)
PCO2 BLDA: 29.2 MM HG (ref 35–45)
PCO2 BLDA: 30.9 MM HG (ref 35–45)
PCO2 BLDA: 31.5 MM HG (ref 35–45)
PCO2 BLDA: 32.7 MM HG (ref 35–45)
PCO2 BLDA: 33.3 MM HG (ref 35–45)
PCO2 BLDA: 33.6 MM HG (ref 35–45)
PCO2 BLDA: 34.9 MM HG (ref 35–45)
PCO2 BLDA: 35.6 MM HG (ref 35–45)
PCO2 BLDA: 35.9 MM HG (ref 35–45)
PH BLDA: 7.45 PH UNITS (ref 7.35–7.6)
PH BLDA: 7.46 PH UNITS (ref 7.35–7.45)
PH BLDA: 7.46 PH UNITS (ref 7.35–7.6)
PH BLDA: 7.48 PH UNITS (ref 7.35–7.6)
PH BLDA: 7.48 PH UNITS (ref 7.35–7.6)
PH BLDA: 7.5 PH UNITS (ref 7.35–7.6)
PH BLDA: 7.51 PH UNITS (ref 7.35–7.6)
PH BLDA: 7.52 PH UNITS (ref 7.35–7.6)
PH BLDA: 7.52 PH UNITS (ref 7.35–7.6)
PH UR STRIP.AUTO: 7 [PH] (ref 5–8)
PHOSPHATE SERPL-MCNC: 2.3 MG/DL (ref 2.4–5.1)
PHOSPHATE SERPL-MCNC: 2.4 MG/DL (ref 2.4–5.1)
PLATELET # BLD AUTO: 116 10*3/MM3 (ref 150–450)
PLATELET # BLD AUTO: 68 10*3/MM3 (ref 150–450)
PMV BLD AUTO: 8.1 FL (ref 6–12)
PMV BLD AUTO: 8.9 FL (ref 6–12)
PO2 BLDA: 371 MMHG (ref 80–105)
PO2 BLDA: 400 MMHG (ref 80–105)
PO2 BLDA: 413 MMHG (ref 80–105)
PO2 BLDA: 474 MMHG (ref 80–105)
PO2 BLDA: 49 MMHG (ref 80–105)
PO2 BLDA: 511 MMHG (ref 80–105)
PO2 BLDA: 558 MM HG (ref 83–108)
PO2 BLDA: 570 MMHG (ref 80–105)
PO2 BLDA: 78 MMHG (ref 80–105)
POTASSIUM BLD-SCNC: 3.2 MMOL/L (ref 3.5–5.5)
POTASSIUM BLD-SCNC: 3.2 MMOL/L (ref 3.5–5.5)
POTASSIUM BLD-SCNC: 4.1 MMOL/L (ref 3.5–5.5)
POTASSIUM BLDA-SCNC: 3.1 MMOL/L (ref 3.5–4.9)
POTASSIUM BLDA-SCNC: 3.5 MMOL/L (ref 3.5–4.9)
POTASSIUM BLDA-SCNC: 3.6 MMOL/L (ref 3.5–4.9)
POTASSIUM BLDA-SCNC: 3.8 MMOL/L (ref 3.5–4.9)
POTASSIUM BLDA-SCNC: 3.8 MMOL/L (ref 3.5–4.9)
POTASSIUM BLDA-SCNC: 3.9 MMOL/L (ref 3.5–4.9)
POTASSIUM BLDA-SCNC: 4 MMOL/L (ref 3.5–4.9)
POTASSIUM BLDA-SCNC: 4 MMOL/L (ref 3.5–4.9)
PROT UR QL STRIP: NEGATIVE
PROTHROMBIN TIME: 14.8 SECONDS (ref 9.6–11.5)
PROTHROMBIN TIME: 16.2 SECONDS (ref 9.6–11.5)
RBC # BLD AUTO: 2.12 10*6/MM3 (ref 3.89–5.14)
RBC # BLD AUTO: 2.18 10*6/MM3 (ref 3.89–5.14)
RBC # UR: ABNORMAL /HPF
REF LAB TEST METHOD: ABNORMAL
RH BLD: NEGATIVE
SAO2 % BLDA: 100 % (ref 95–98)
SAO2 % BLDA: 86 % (ref 95–98)
SAO2 % BLDA: 96 % (ref 95–98)
SODIUM BLD-SCNC: 140 MMOL/L (ref 132–146)
SODIUM BLD-SCNC: 141 MMOL/L (ref 132–146)
SODIUM BLDA-SCNC: 139 MMOL/L (ref 138–146)
SODIUM BLDA-SCNC: 140 MMOL/L (ref 138–146)
SODIUM BLDA-SCNC: 141 MMOL/L (ref 138–146)
SODIUM BLDA-SCNC: 141 MMOL/L (ref 138–146)
SODIUM BLDA-SCNC: 142 MMOL/L (ref 138–146)
SODIUM BLDA-SCNC: 143 MMOL/L (ref 138–146)
SP GR UR STRIP: 1.01 (ref 1–1.03)
SQUAMOUS #/AREA URNS HPF: ABNORMAL /HPF
UROBILINOGEN UR QL STRIP: ABNORMAL
WBC NRBC COR # BLD: 5.51 10*3/MM3 (ref 3.5–10.8)
WBC NRBC COR # BLD: 5.89 10*3/MM3 (ref 3.5–10.8)
WBC UR QL AUTO: ABNORMAL /HPF

## 2017-11-10 PROCEDURE — 5A1221Z PERFORMANCE OF CARDIAC OUTPUT, CONTINUOUS: ICD-10-PCS | Performed by: THORACIC SURGERY (CARDIOTHORACIC VASCULAR SURGERY)

## 2017-11-10 PROCEDURE — 94799 UNLISTED PULMONARY SVC/PX: CPT

## 2017-11-10 PROCEDURE — 85610 PROTHROMBIN TIME: CPT | Performed by: THORACIC SURGERY (CARDIOTHORACIC VASCULAR SURGERY)

## 2017-11-10 PROCEDURE — 25010000002 ALBUMIN HUMAN 5% PER 50 ML

## 2017-11-10 PROCEDURE — 83735 ASSAY OF MAGNESIUM: CPT | Performed by: PHYSICIAN ASSISTANT

## 2017-11-10 PROCEDURE — 85730 THROMBOPLASTIN TIME PARTIAL: CPT | Performed by: THORACIC SURGERY (CARDIOTHORACIC VASCULAR SURGERY)

## 2017-11-10 PROCEDURE — 25010000003 POTASSIUM CHLORIDE PER 2 MEQ: Performed by: PHYSICIAN ASSISTANT

## 2017-11-10 PROCEDURE — 86900 BLOOD TYPING SEROLOGIC ABO: CPT

## 2017-11-10 PROCEDURE — 25010000002 PROPOFOL 10 MG/ML EMULSION: Performed by: ANESTHESIOLOGY

## 2017-11-10 PROCEDURE — 84132 ASSAY OF SERUM POTASSIUM: CPT

## 2017-11-10 PROCEDURE — P9041 ALBUMIN (HUMAN),5%, 50ML: HCPCS | Performed by: ANESTHESIOLOGY

## 2017-11-10 PROCEDURE — 25810000003 DEXTROSE 5 % WITH KCL 20 MEQ 20-5 MEQ/L-% SOLUTION: Performed by: PHYSICIAN ASSISTANT

## 2017-11-10 PROCEDURE — 85347 COAGULATION TIME ACTIVATED: CPT

## 2017-11-10 PROCEDURE — P9041 ALBUMIN (HUMAN),5%, 50ML: HCPCS

## 2017-11-10 PROCEDURE — 33405 REPLACEMENT AORTIC VALVE OPN: CPT | Performed by: THORACIC SURGERY (CARDIOTHORACIC VASCULAR SURGERY)

## 2017-11-10 PROCEDURE — P9035 PLATELET PHERES LEUKOREDUCED: HCPCS

## 2017-11-10 PROCEDURE — 02RF08Z REPLACEMENT OF AORTIC VALVE WITH ZOOPLASTIC TISSUE, OPEN APPROACH: ICD-10-PCS | Performed by: THORACIC SURGERY (CARDIOTHORACIC VASCULAR SURGERY)

## 2017-11-10 PROCEDURE — 82947 ASSAY GLUCOSE BLOOD QUANT: CPT

## 2017-11-10 PROCEDURE — 25010000002 MIDAZOLAM PER 1 MG: Performed by: ANESTHESIOLOGY

## 2017-11-10 PROCEDURE — 81001 URINALYSIS AUTO W/SCOPE: CPT | Performed by: PHYSICIAN ASSISTANT

## 2017-11-10 PROCEDURE — 85362 FIBRIN DEGRADATION PRODUCTS: CPT | Performed by: THORACIC SURGERY (CARDIOTHORACIC VASCULAR SURGERY)

## 2017-11-10 PROCEDURE — P9041 ALBUMIN (HUMAN),5%, 50ML: HCPCS | Performed by: PHYSICIAN ASSISTANT

## 2017-11-10 PROCEDURE — 25010000002 DOBUTAMINE PER 250 MG: Performed by: PHYSICIAN ASSISTANT

## 2017-11-10 PROCEDURE — 94002 VENT MGMT INPAT INIT DAY: CPT

## 2017-11-10 PROCEDURE — 82330 ASSAY OF CALCIUM: CPT | Performed by: PHYSICIAN ASSISTANT

## 2017-11-10 PROCEDURE — 25010000002 ALBUMIN HUMAN 5% PER 50 ML: Performed by: PHYSICIAN ASSISTANT

## 2017-11-10 PROCEDURE — 25010000002 VANCOMYCIN PER 500 MG: Performed by: ANESTHESIOLOGY

## 2017-11-10 PROCEDURE — 87086 URINE CULTURE/COLONY COUNT: CPT | Performed by: PHYSICIAN ASSISTANT

## 2017-11-10 PROCEDURE — 85610 PROTHROMBIN TIME: CPT | Performed by: PHYSICIAN ASSISTANT

## 2017-11-10 PROCEDURE — 25010000002 VANCOMYCIN HCL IN NACL 1.5-0.9 GM/500ML-% SOLUTION

## 2017-11-10 PROCEDURE — 71010 HC CHEST PA OR AP: CPT

## 2017-11-10 PROCEDURE — 82330 ASSAY OF CALCIUM: CPT

## 2017-11-10 PROCEDURE — 25010000002 PROTAMINE SULFATE PER 10 MG: Performed by: PHYSICIAN ASSISTANT

## 2017-11-10 PROCEDURE — 85027 COMPLETE CBC AUTOMATED: CPT | Performed by: PHYSICIAN ASSISTANT

## 2017-11-10 PROCEDURE — 80069 RENAL FUNCTION PANEL: CPT | Performed by: PHYSICIAN ASSISTANT

## 2017-11-10 PROCEDURE — 36430 TRANSFUSION BLD/BLD COMPNT: CPT

## 2017-11-10 PROCEDURE — 99233 SBSQ HOSP IP/OBS HIGH 50: CPT | Performed by: INTERNAL MEDICINE

## 2017-11-10 PROCEDURE — C1751 CATH, INF, PER/CENT/MIDLINE: HCPCS | Performed by: ANESTHESIOLOGY

## 2017-11-10 PROCEDURE — 82805 BLOOD GASES W/O2 SATURATION: CPT | Performed by: THORACIC SURGERY (CARDIOTHORACIC VASCULAR SURGERY)

## 2017-11-10 PROCEDURE — 84295 ASSAY OF SERUM SODIUM: CPT

## 2017-11-10 PROCEDURE — B24BZZ4 ULTRASONOGRAPHY OF HEART WITH AORTA, TRANSESOPHAGEAL: ICD-10-PCS | Performed by: ANESTHESIOLOGY

## 2017-11-10 PROCEDURE — P9016 RBC LEUKOCYTES REDUCED: HCPCS

## 2017-11-10 PROCEDURE — 85014 HEMATOCRIT: CPT

## 2017-11-10 PROCEDURE — 82803 BLOOD GASES ANY COMBINATION: CPT

## 2017-11-10 PROCEDURE — 25010000002 PROTAMINE SULFATE PER 10 MG: Performed by: ANESTHESIOLOGY

## 2017-11-10 PROCEDURE — 86901 BLOOD TYPING SEROLOGIC RH(D): CPT

## 2017-11-10 PROCEDURE — 25010000002 VANCOMYCIN PER 500 MG: Performed by: PHYSICIAN ASSISTANT

## 2017-11-10 PROCEDURE — 85027 COMPLETE CBC AUTOMATED: CPT | Performed by: THORACIC SURGERY (CARDIOTHORACIC VASCULAR SURGERY)

## 2017-11-10 PROCEDURE — 25010000002 ALBUMIN HUMAN 5% PER 50 ML: Performed by: ANESTHESIOLOGY

## 2017-11-10 PROCEDURE — 84132 ASSAY OF SERUM POTASSIUM: CPT | Performed by: THORACIC SURGERY (CARDIOTHORACIC VASCULAR SURGERY)

## 2017-11-10 PROCEDURE — 85384 FIBRINOGEN ACTIVITY: CPT | Performed by: THORACIC SURGERY (CARDIOTHORACIC VASCULAR SURGERY)

## 2017-11-10 PROCEDURE — 93005 ELECTROCARDIOGRAM TRACING: CPT | Performed by: PHYSICIAN ASSISTANT

## 2017-11-10 PROCEDURE — 85730 THROMBOPLASTIN TIME PARTIAL: CPT | Performed by: PHYSICIAN ASSISTANT

## 2017-11-10 PROCEDURE — 25010000002 HEPARIN (PORCINE) PER 1000 UNITS: Performed by: ANESTHESIOLOGY

## 2017-11-10 PROCEDURE — 88305 TISSUE EXAM BY PATHOLOGIST: CPT | Performed by: THORACIC SURGERY (CARDIOTHORACIC VASCULAR SURGERY)

## 2017-11-10 PROCEDURE — 93010 ELECTROCARDIOGRAM REPORT: CPT | Performed by: INTERNAL MEDICINE

## 2017-11-10 PROCEDURE — 93318 ECHO TRANSESOPHAGEAL INTRAOP: CPT | Performed by: ANESTHESIOLOGY

## 2017-11-10 DEVICE — VLV PERICARD PERIMOUNT MAGNA EASE 21: Type: IMPLANTABLE DEVICE | Site: HEART | Status: FUNCTIONAL

## 2017-11-10 RX ORDER — ASPIRIN 325 MG
325 TABLET ORAL ONCE
Status: DISCONTINUED | OUTPATIENT
Start: 2017-11-10 | End: 2017-11-11

## 2017-11-10 RX ORDER — PROPOFOL 10 MG/ML
VIAL (ML) INTRAVENOUS CONTINUOUS PRN
Status: DISCONTINUED | OUTPATIENT
Start: 2017-11-10 | End: 2017-11-10 | Stop reason: SURG

## 2017-11-10 RX ORDER — METOPROLOL TARTRATE 5 MG/5ML
2.5 INJECTION INTRAVENOUS EVERY 6 HOURS SCHEDULED
Status: DISCONTINUED | OUTPATIENT
Start: 2017-11-10 | End: 2017-11-11

## 2017-11-10 RX ORDER — CHLORHEXIDINE GLUCONATE 0.12 MG/ML
15 RINSE ORAL ONCE
Status: COMPLETED | OUTPATIENT
Start: 2017-11-10 | End: 2017-11-10

## 2017-11-10 RX ORDER — ASPIRIN 325 MG
325 TABLET ORAL NIGHTLY
Status: DISCONTINUED | OUTPATIENT
Start: 2017-11-10 | End: 2017-11-10

## 2017-11-10 RX ORDER — FAMOTIDINE 10 MG/ML
20 INJECTION, SOLUTION INTRAVENOUS ONCE
Status: DISCONTINUED | OUTPATIENT
Start: 2017-11-10 | End: 2017-11-10

## 2017-11-10 RX ORDER — SODIUM CHLORIDE 9 MG/ML
30 INJECTION, SOLUTION INTRAVENOUS CONTINUOUS PRN
Status: DISCONTINUED | OUTPATIENT
Start: 2017-11-10 | End: 2017-11-11

## 2017-11-10 RX ORDER — PROTAMINE SULFATE 10 MG/ML
INJECTION, SOLUTION INTRAVENOUS AS NEEDED
Status: DISCONTINUED | OUTPATIENT
Start: 2017-11-10 | End: 2017-11-10 | Stop reason: SURG

## 2017-11-10 RX ORDER — POTASSIUM CHLORIDE 29.8 MG/ML
20 INJECTION INTRAVENOUS
Status: DISCONTINUED | OUTPATIENT
Start: 2017-11-10 | End: 2017-11-13

## 2017-11-10 RX ORDER — DEXMEDETOMIDINE HYDROCHLORIDE 4 UG/ML
.2-1.5 INJECTION, SOLUTION INTRAVENOUS CONTINUOUS PRN
Status: DISCONTINUED | OUTPATIENT
Start: 2017-11-10 | End: 2017-11-12

## 2017-11-10 RX ORDER — LIDOCAINE HYDROCHLORIDE 10 MG/ML
0.5 INJECTION, SOLUTION EPIDURAL; INFILTRATION; INTRACAUDAL; PERINEURAL ONCE AS NEEDED
Status: COMPLETED | OUTPATIENT
Start: 2017-11-10 | End: 2017-11-10

## 2017-11-10 RX ORDER — DOBUTAMINE HYDROCHLORIDE 100 MG/100ML
2-20 INJECTION INTRAVENOUS CONTINUOUS PRN
Status: DISCONTINUED | OUTPATIENT
Start: 2017-11-10 | End: 2017-11-13

## 2017-11-10 RX ORDER — FAMOTIDINE 20 MG/1
20 TABLET, FILM COATED ORAL ONCE
Status: COMPLETED | OUTPATIENT
Start: 2017-11-10 | End: 2017-11-10

## 2017-11-10 RX ORDER — CHLORHEXIDINE GLUCONATE 500 MG/1
1 CLOTH TOPICAL EVERY 12 HOURS PRN
Status: DISCONTINUED | OUTPATIENT
Start: 2017-11-10 | End: 2017-11-10 | Stop reason: HOSPADM

## 2017-11-10 RX ORDER — DOCUSATE SODIUM 100 MG/1
100 CAPSULE, LIQUID FILLED ORAL 2 TIMES DAILY PRN
Status: DISCONTINUED | OUTPATIENT
Start: 2017-11-10 | End: 2017-11-21 | Stop reason: HOSPADM

## 2017-11-10 RX ORDER — ROCURONIUM BROMIDE 10 MG/ML
INJECTION, SOLUTION INTRAVENOUS AS NEEDED
Status: DISCONTINUED | OUTPATIENT
Start: 2017-11-10 | End: 2017-11-10 | Stop reason: SURG

## 2017-11-10 RX ORDER — NITROGLYCERIN 0.4 MG/1
0.4 TABLET SUBLINGUAL
Status: DISCONTINUED | OUTPATIENT
Start: 2017-11-10 | End: 2017-11-10 | Stop reason: HOSPADM

## 2017-11-10 RX ORDER — TRANEXAMIC ACID 100 MG/ML
130 INJECTION, SOLUTION INTRAVENOUS ONCE
Status: COMPLETED | OUTPATIENT
Start: 2017-11-10 | End: 2017-11-10

## 2017-11-10 RX ORDER — ALBUMIN, HUMAN INJ 5% 5 %
SOLUTION INTRAVENOUS CONTINUOUS PRN
Status: DISCONTINUED | OUTPATIENT
Start: 2017-11-10 | End: 2017-11-10 | Stop reason: SURG

## 2017-11-10 RX ORDER — ACETAMINOPHEN 325 MG/1
650 TABLET ORAL EVERY 4 HOURS PRN
Status: DISCONTINUED | OUTPATIENT
Start: 2017-11-10 | End: 2017-11-21 | Stop reason: HOSPADM

## 2017-11-10 RX ORDER — HYDROCODONE BITARTRATE AND ACETAMINOPHEN 7.5; 325 MG/1; MG/1
1 TABLET ORAL EVERY 4 HOURS PRN
Status: DISCONTINUED | OUTPATIENT
Start: 2017-11-10 | End: 2017-11-15

## 2017-11-10 RX ORDER — HEPARIN SODIUM 1000 [USP'U]/ML
INJECTION, SOLUTION INTRAVENOUS; SUBCUTANEOUS AS NEEDED
Status: DISCONTINUED | OUTPATIENT
Start: 2017-11-10 | End: 2017-11-10 | Stop reason: SURG

## 2017-11-10 RX ORDER — MAGNESIUM SULFATE HEPTAHYDRATE 40 MG/ML
4 INJECTION, SOLUTION INTRAVENOUS AS NEEDED
Status: DISCONTINUED | OUTPATIENT
Start: 2017-11-10 | End: 2017-11-21 | Stop reason: HOSPADM

## 2017-11-10 RX ORDER — RISPERIDONE 1 MG/1
3 TABLET ORAL NIGHTLY
Status: DISCONTINUED | OUTPATIENT
Start: 2017-11-11 | End: 2017-11-11

## 2017-11-10 RX ORDER — NOREPINEPHRINE BITARTRATE 1 MG/ML
INJECTION, SOLUTION INTRAVENOUS CONTINUOUS PRN
Status: DISCONTINUED | OUTPATIENT
Start: 2017-11-10 | End: 2017-11-10 | Stop reason: SURG

## 2017-11-10 RX ORDER — GLYCOPYRROLATE 0.2 MG/ML
INJECTION INTRAMUSCULAR; INTRAVENOUS AS NEEDED
Status: DISCONTINUED | OUTPATIENT
Start: 2017-11-10 | End: 2017-11-10 | Stop reason: SURG

## 2017-11-10 RX ORDER — MORPHINE SULFATE 4 MG/ML
2 INJECTION, SOLUTION INTRAMUSCULAR; INTRAVENOUS
Status: DISCONTINUED | OUTPATIENT
Start: 2017-11-10 | End: 2017-11-11

## 2017-11-10 RX ORDER — FENTANYL CITRATE 50 UG/ML
25 INJECTION, SOLUTION INTRAMUSCULAR; INTRAVENOUS
Status: DISCONTINUED | OUTPATIENT
Start: 2017-11-10 | End: 2017-11-11

## 2017-11-10 RX ORDER — MAGNESIUM SULFATE HEPTAHYDRATE 40 MG/ML
2 INJECTION, SOLUTION INTRAVENOUS AS NEEDED
Status: DISCONTINUED | OUTPATIENT
Start: 2017-11-10 | End: 2017-11-21 | Stop reason: HOSPADM

## 2017-11-10 RX ORDER — NITROGLYCERIN 20 MG/100ML
5-200 INJECTION INTRAVENOUS CONTINUOUS PRN
Status: DISCONTINUED | OUTPATIENT
Start: 2017-11-10 | End: 2017-11-12

## 2017-11-10 RX ORDER — BENZTROPINE MESYLATE 1 MG/1
1 TABLET ORAL 2 TIMES DAILY
Status: DISCONTINUED | OUTPATIENT
Start: 2017-11-11 | End: 2017-11-13

## 2017-11-10 RX ORDER — POTASSIUM CHLORIDE 1.5 G/1.77G
40 POWDER, FOR SOLUTION ORAL AS NEEDED
Status: DISCONTINUED | OUTPATIENT
Start: 2017-11-10 | End: 2017-11-21 | Stop reason: HOSPADM

## 2017-11-10 RX ORDER — SODIUM CHLORIDE 9 MG/ML
INJECTION, SOLUTION INTRAVENOUS AS NEEDED
Status: DISCONTINUED | OUTPATIENT
Start: 2017-11-10 | End: 2017-11-10 | Stop reason: HOSPADM

## 2017-11-10 RX ORDER — ATORVASTATIN CALCIUM 40 MG/1
40 TABLET, FILM COATED ORAL NIGHTLY
Status: DISCONTINUED | OUTPATIENT
Start: 2017-11-10 | End: 2017-11-21 | Stop reason: HOSPADM

## 2017-11-10 RX ORDER — POTASSIUM CHLORIDE 750 MG/1
40 CAPSULE, EXTENDED RELEASE ORAL AS NEEDED
Status: DISCONTINUED | OUTPATIENT
Start: 2017-11-10 | End: 2017-11-21 | Stop reason: HOSPADM

## 2017-11-10 RX ORDER — SODIUM CHLORIDE, SODIUM LACTATE, POTASSIUM CHLORIDE, CALCIUM CHLORIDE 600; 310; 30; 20 MG/100ML; MG/100ML; MG/100ML; MG/100ML
9 INJECTION, SOLUTION INTRAVENOUS CONTINUOUS PRN
Status: DISCONTINUED | OUTPATIENT
Start: 2017-11-10 | End: 2017-11-13

## 2017-11-10 RX ORDER — LEVOTHYROXINE SODIUM 88 UG/1
88 TABLET ORAL
Status: DISCONTINUED | OUTPATIENT
Start: 2017-11-11 | End: 2017-11-21 | Stop reason: HOSPADM

## 2017-11-10 RX ORDER — SUFENTANIL CITRATE 50 UG/ML
INJECTION EPIDURAL; INTRAVENOUS AS NEEDED
Status: DISCONTINUED | OUTPATIENT
Start: 2017-11-10 | End: 2017-11-10 | Stop reason: SURG

## 2017-11-10 RX ORDER — POTASSIUM CHLORIDE, DEXTROSE MONOHYDRATE 150; 5 MG/100ML; G/100ML
30 INJECTION, SOLUTION INTRAVENOUS CONTINUOUS
Status: DISCONTINUED | OUTPATIENT
Start: 2017-11-10 | End: 2017-11-12

## 2017-11-10 RX ORDER — PHENYLEPHRINE HCL IN 0.9% NACL 0.5 MG/5ML
.5-3 SYRINGE (ML) INTRAVENOUS CONTINUOUS PRN
Status: DISCONTINUED | OUTPATIENT
Start: 2017-11-10 | End: 2017-11-12

## 2017-11-10 RX ORDER — SODIUM CHLORIDE 0.9 % (FLUSH) 0.9 %
1-10 SYRINGE (ML) INJECTION AS NEEDED
Status: DISCONTINUED | OUTPATIENT
Start: 2017-11-10 | End: 2017-11-10 | Stop reason: HOSPADM

## 2017-11-10 RX ORDER — CALCIUM CHLORIDE 100 MG/ML
INJECTION INTRAVENOUS; INTRAVENTRICULAR AS NEEDED
Status: DISCONTINUED | OUTPATIENT
Start: 2017-11-10 | End: 2017-11-10 | Stop reason: SURG

## 2017-11-10 RX ORDER — MIDAZOLAM HYDROCHLORIDE 1 MG/ML
INJECTION INTRAMUSCULAR; INTRAVENOUS AS NEEDED
Status: DISCONTINUED | OUTPATIENT
Start: 2017-11-10 | End: 2017-11-10 | Stop reason: SURG

## 2017-11-10 RX ORDER — OXYCODONE HYDROCHLORIDE AND ACETAMINOPHEN 5; 325 MG/1; MG/1
2 TABLET ORAL EVERY 4 HOURS PRN
Status: DISCONTINUED | OUTPATIENT
Start: 2017-11-10 | End: 2017-11-12

## 2017-11-10 RX ORDER — MEPERIDINE HYDROCHLORIDE 25 MG/ML
25 INJECTION INTRAMUSCULAR; INTRAVENOUS; SUBCUTANEOUS EVERY 4 HOURS PRN
Status: DISCONTINUED | OUTPATIENT
Start: 2017-11-10 | End: 2017-11-11

## 2017-11-10 RX ORDER — ALBUMIN, HUMAN INJ 5% 5 %
500 SOLUTION INTRAVENOUS AS NEEDED
Status: COMPLETED | OUTPATIENT
Start: 2017-11-10 | End: 2017-11-10

## 2017-11-10 RX ORDER — LIDOCAINE HYDROCHLORIDE 10 MG/ML
0.5 INJECTION, SOLUTION EPIDURAL; INFILTRATION; INTRACAUDAL; PERINEURAL ONCE AS NEEDED
Status: DISCONTINUED | OUTPATIENT
Start: 2017-11-10 | End: 2017-11-10

## 2017-11-10 RX ORDER — SODIUM CHLORIDE 0.9 % (FLUSH) 0.9 %
30 SYRINGE (ML) INJECTION ONCE AS NEEDED
Status: DISCONTINUED | OUTPATIENT
Start: 2017-11-10 | End: 2017-11-11

## 2017-11-10 RX ORDER — PROTAMINE SULFATE 10 MG/ML
50 INJECTION, SOLUTION INTRAVENOUS ONCE
Status: COMPLETED | OUTPATIENT
Start: 2017-11-10 | End: 2017-11-10

## 2017-11-10 RX ORDER — ACETAMINOPHEN 325 MG/1
650 TABLET ORAL EVERY 4 HOURS PRN
Status: DISCONTINUED | OUTPATIENT
Start: 2017-11-10 | End: 2017-11-10 | Stop reason: HOSPADM

## 2017-11-10 RX ORDER — FAMOTIDINE 10 MG/ML
20 INJECTION, SOLUTION INTRAVENOUS 2 TIMES DAILY
Status: DISCONTINUED | OUTPATIENT
Start: 2017-11-10 | End: 2017-11-11 | Stop reason: ALTCHOICE

## 2017-11-10 RX ORDER — ETOMIDATE 2 MG/ML
INJECTION INTRAVENOUS AS NEEDED
Status: DISCONTINUED | OUTPATIENT
Start: 2017-11-10 | End: 2017-11-10 | Stop reason: SURG

## 2017-11-10 RX ORDER — NALOXONE HCL 0.4 MG/ML
0.4 VIAL (ML) INJECTION
Status: DISCONTINUED | OUTPATIENT
Start: 2017-11-10 | End: 2017-11-11

## 2017-11-10 RX ORDER — CHLORHEXIDINE GLUCONATE 500 MG/1
1 CLOTH TOPICAL EVERY 12 HOURS PRN
Status: DISCONTINUED | OUTPATIENT
Start: 2017-11-10 | End: 2017-11-12

## 2017-11-10 RX ORDER — CHLORHEXIDINE GLUCONATE 0.12 MG/ML
15 RINSE ORAL EVERY 12 HOURS SCHEDULED
Status: DISCONTINUED | OUTPATIENT
Start: 2017-11-10 | End: 2017-11-12

## 2017-11-10 RX ORDER — ASPIRIN 325 MG
325 TABLET, DELAYED RELEASE (ENTERIC COATED) ORAL DAILY
Status: DISCONTINUED | OUTPATIENT
Start: 2017-11-11 | End: 2017-11-21 | Stop reason: HOSPADM

## 2017-11-10 RX ORDER — SENNA AND DOCUSATE SODIUM 50; 8.6 MG/1; MG/1
2 TABLET, FILM COATED ORAL 2 TIMES DAILY
Status: DISCONTINUED | OUTPATIENT
Start: 2017-11-10 | End: 2017-11-14

## 2017-11-10 RX ORDER — BISACODYL 10 MG
10 SUPPOSITORY, RECTAL RECTAL DAILY PRN
Status: DISCONTINUED | OUTPATIENT
Start: 2017-11-11 | End: 2017-11-21 | Stop reason: HOSPADM

## 2017-11-10 RX ORDER — ONDANSETRON 2 MG/ML
4 INJECTION INTRAMUSCULAR; INTRAVENOUS EVERY 6 HOURS PRN
Status: DISCONTINUED | OUTPATIENT
Start: 2017-11-10 | End: 2017-11-21 | Stop reason: HOSPADM

## 2017-11-10 RX ORDER — BISACODYL 5 MG/1
10 TABLET, DELAYED RELEASE ORAL DAILY PRN
Status: DISCONTINUED | OUTPATIENT
Start: 2017-11-10 | End: 2017-11-21 | Stop reason: HOSPADM

## 2017-11-10 RX ORDER — FAMOTIDINE 20 MG/1
20 TABLET, FILM COATED ORAL 2 TIMES DAILY
Status: DISCONTINUED | OUTPATIENT
Start: 2017-11-10 | End: 2017-11-21 | Stop reason: HOSPADM

## 2017-11-10 RX ORDER — DOPAMINE HYDROCHLORIDE 160 MG/100ML
2-20 INJECTION, SOLUTION INTRAVENOUS CONTINUOUS PRN
Status: DISCONTINUED | OUTPATIENT
Start: 2017-11-10 | End: 2017-11-12

## 2017-11-10 RX ORDER — SODIUM CHLORIDE, SODIUM LACTATE, POTASSIUM CHLORIDE, CALCIUM CHLORIDE 600; 310; 30; 20 MG/100ML; MG/100ML; MG/100ML; MG/100ML
9 INJECTION, SOLUTION INTRAVENOUS CONTINUOUS
Status: DISCONTINUED | OUTPATIENT
Start: 2017-11-10 | End: 2017-11-10

## 2017-11-10 RX ORDER — ALBUMIN, HUMAN INJ 5% 5 %
SOLUTION INTRAVENOUS
Status: COMPLETED
Start: 2017-11-10 | End: 2017-11-10

## 2017-11-10 RX ADMIN — GLYCOPYRROLATE 0.4 MG: 0.2 INJECTION, SOLUTION INTRAMUSCULAR; INTRAVENOUS at 16:27

## 2017-11-10 RX ADMIN — MIDAZOLAM HYDROCHLORIDE 5 MG: 1 INJECTION, SOLUTION INTRAMUSCULAR; INTRAVENOUS at 13:42

## 2017-11-10 RX ADMIN — DOBUTAMINE HYDROCHLORIDE 5 MCG/KG/MIN: 100 INJECTION INTRAVENOUS at 18:25

## 2017-11-10 RX ADMIN — LIDOCAINE HYDROCHLORIDE 0.2 ML: 10 INJECTION, SOLUTION EPIDURAL; INFILTRATION; INTRACAUDAL; PERINEURAL at 09:49

## 2017-11-10 RX ADMIN — FAMOTIDINE 20 MG: 20 TABLET, FILM COATED ORAL at 10:18

## 2017-11-10 RX ADMIN — SODIUM CHLORIDE, POTASSIUM CHLORIDE, SODIUM LACTATE AND CALCIUM CHLORIDE: 600; 310; 30; 20 INJECTION, SOLUTION INTRAVENOUS at 13:42

## 2017-11-10 RX ADMIN — ALBUMIN HUMAN 500 ML: 0.05 INJECTION, SOLUTION INTRAVENOUS at 18:50

## 2017-11-10 RX ADMIN — SUFENTANIL CITRATE 100 MCG: 50 INJECTION EPIDURAL; INTRAVENOUS at 14:05

## 2017-11-10 RX ADMIN — HEPARIN SODIUM 20000 UNITS: 1000 INJECTION, SOLUTION INTRAVENOUS; SUBCUTANEOUS at 14:40

## 2017-11-10 RX ADMIN — PROPOFOL 25 MCG/KG/MIN: 10 INJECTION, EMULSION INTRAVENOUS at 17:10

## 2017-11-10 RX ADMIN — ROCURONIUM BROMIDE 50 MG: 10 INJECTION INTRAVENOUS at 16:15

## 2017-11-10 RX ADMIN — SODIUM CHLORIDE, POTASSIUM CHLORIDE, SODIUM LACTATE AND CALCIUM CHLORIDE 9 ML/HR: 600; 310; 30; 20 INJECTION, SOLUTION INTRAVENOUS at 09:49

## 2017-11-10 RX ADMIN — ALBUMIN HUMAN: 0.05 INJECTION, SOLUTION INTRAVENOUS at 16:55

## 2017-11-10 RX ADMIN — ETOMIDATE 10.62 MG: 2 INJECTION INTRAVENOUS at 13:42

## 2017-11-10 RX ADMIN — MIDAZOLAM HYDROCHLORIDE 5 MG: 1 INJECTION, SOLUTION INTRAMUSCULAR; INTRAVENOUS at 16:15

## 2017-11-10 RX ADMIN — ROCURONIUM BROMIDE 50 MG: 10 INJECTION INTRAVENOUS at 13:42

## 2017-11-10 RX ADMIN — VANCOMYCIN HYDROCHLORIDE 750 G: 1 INJECTION, POWDER, LYOPHILIZED, FOR SOLUTION INTRAVENOUS at 13:39

## 2017-11-10 RX ADMIN — MUPIROCIN 1 APPLICATION: 20 OINTMENT TOPICAL at 10:18

## 2017-11-10 RX ADMIN — PROTAMINE SULFATE 150 MG: 10 INJECTION, SOLUTION INTRAVENOUS at 16:35

## 2017-11-10 RX ADMIN — SUFENTANIL CITRATE 100 MCG: 50 INJECTION EPIDURAL; INTRAVENOUS at 13:42

## 2017-11-10 RX ADMIN — TRANEXAMIC ACID 637 MG: 100 INJECTION, SOLUTION INTRAVENOUS at 14:08

## 2017-11-10 RX ADMIN — PROTAMINE SULFATE 50 MG: 10 INJECTION, SOLUTION INTRAVENOUS at 19:42

## 2017-11-10 RX ADMIN — VANCOMYCIN HYDROCHLORIDE 750 MG: 750 INJECTION, SOLUTION INTRAVENOUS at 13:28

## 2017-11-10 RX ADMIN — POTASSIUM CHLORIDE 20 MEQ: 400 INJECTION, SOLUTION INTRAVENOUS at 18:51

## 2017-11-10 RX ADMIN — ALBUMIN HUMAN 500 ML: 0.05 INJECTION, SOLUTION INTRAVENOUS at 18:00

## 2017-11-10 RX ADMIN — CHLORHEXIDINE GLUCONATE 15 ML: 1.2 RINSE ORAL at 10:18

## 2017-11-10 RX ADMIN — TRANEXAMIC ACID 531 MG: 100 INJECTION, SOLUTION INTRAVENOUS at 14:08

## 2017-11-10 RX ADMIN — CALCIUM CHLORIDE 1 G: 100 INJECTION INTRAVENOUS; INTRAVENTRICULAR at 16:35

## 2017-11-10 RX ADMIN — POTASSIUM CHLORIDE AND DEXTROSE MONOHYDRATE 30 ML/HR: 150; 5 INJECTION, SOLUTION INTRAVENOUS at 17:55

## 2017-11-10 RX ADMIN — NOREPINEPHRINE BITARTRATE 0.02 MCG/KG/MIN: 1 INJECTION, SOLUTION, CONCENTRATE INTRAVENOUS at 17:15

## 2017-11-10 RX ADMIN — SUFENTANIL CITRATE 50 MCG: 50 INJECTION EPIDURAL; INTRAVENOUS at 16:15

## 2017-11-10 RX ADMIN — CHLORHEXIDINE GLUCONATE 15 ML: 1.2 RINSE ORAL at 23:32

## 2017-11-10 RX ADMIN — POTASSIUM CHLORIDE 20 MEQ: 400 INJECTION, SOLUTION INTRAVENOUS at 19:43

## 2017-11-10 RX ADMIN — ATORVASTATIN CALCIUM 40 MG: 40 TABLET, FILM COATED ORAL at 23:32

## 2017-11-10 NOTE — ANESTHESIA PREPROCEDURE EVALUATION
Anesthesia Evaluation     Patient summary reviewed and Nursing notes reviewed          Airway   Mallampati: II  TM distance: >3 FB  Neck ROM: full  no difficulty expected  Dental      Pulmonary     breath sounds clear to auscultation  Cardiovascular     Rhythm: regular  Rate: normal    (+) hypertension, valvular problems/murmurs AS, CHF, murmur, hyperlipidemia    ROS comment: EF 40-45%    Neuro/Psych  GI/Hepatic/Renal/Endo    (+)  hypothyroidism,   (-) diabetes    Musculoskeletal     Abdominal    Substance History      OB/GYN          Other            Phys Exam Other: Partial plate                            Anesthesia Plan    ASA 4     general   ([place], PAC, MARYSE)  intravenous induction   Anesthetic plan and risks discussed with patient.  Use of blood products discussed with patient  Consented to blood products.

## 2017-11-10 NOTE — ANESTHESIA PROCEDURE NOTES
Central Line    Patient location during procedure: OR  Indications: vascular access  Preanesthetic Checklist  Completed: patient identified, site marked, surgical consent, pre-op evaluation, timeout performed, IV checked, risks and benefits discussed and monitors and equipment checked  Central Line Prep  Sterile Tech:cap, gloves, gown, mask and sterile barriers  Prep: chloraprep  Patient monitoring: blood pressure monitoring, continuous pulse oximetry and EKG  Central Line Procedure  Laterality:right  Location:internal jugular  Catheter Type:Cordis and Eaton-Federico  Catheter Size:9 Fr  Guidance:ultrasound guided  PROCEDURE NOTE/ULTRASOUND INTERPRETATION.  Using ultrasound guidance the potential vascular sites for insertion of the catheter were visualized to determine the patency of the vessel to be used for vascular access.  After selecting the appropriate site for insertion, the needle was visualized under ultrasound being inserted into the internal jugular vein, followed by ultrasound confirmation of wire and catheter placement. There were no abnormalities seen on ultrasound; an image was taken; and the patient tolerated the procedure with no complications.   Assessment  Post procedure:biopatch applied, line sutured, occlusive dressing applied and secured with tape  Assessement:blood return through all ports, free fluid flow and chest x-ray ordered  Complications:no  Patient Tolerance:patient tolerated the procedure well with no apparent complications

## 2017-11-10 NOTE — ANESTHESIA PROCEDURE NOTES
Procedure Performed: Emergent/Open-Heart Anesthesia MARYSE     Start Time:        End Time:      Preanesthesia Checklist:  Patient identified, IV assessed, risks and benefits discussed, monitors and equipment assessed, procedure being performed at surgeon's request and anesthesia consent obtained.    General Procedure Information  Diagnostic Indications for Echo:  assessment of ascending aorta, assessment of surgical repair and hemodynamic monitoring  Physician Requesting Echo: RUBENS KEITH  Location performed:  OR  Intubated  Bite block not placed  Heart visualized  Probe Insertion:  Easy  Probe Type:  Multiplane  Modalities:  Color flow mapping, continuous wave Doppler and pulse wave Doppler    Echocardiographic and Doppler Measurements    Ventricles    Right Ventricle:  Hypertrophy not present.  Thrombus not present.  Global function normal.    Left Ventricle:  Cavity size normal.  Thrombus not present.  Global Function moderately impaired.  Ejection Fraction 45%.          Valves    Aortic Valve:  Annulus calcified.  Stenosis severe.  Area: 0.62 cm².  Mean Gradient: 13 mean 7 mmHg.  Pressure Half-time: 289 ms.  Regurgitation moderate.  Leaflets calcified.  Leaflet motions normal and restricted.      Mitral Valve:  Annulus normal.  Stenosis not present.  Regurgitation trace.  Leaflets normal.  Leaflet motions normal.      Tricuspid Valve:  Annulus normal.  Regurgitation trace.  Leaflets normal.  Leaflet motions normal.    Pulmonic Valve:  Annulus normal.          Aorta    Ascending Aorta:  Size normal.  Dissection not present.  Plaque thickness less than 3 mm.  Mobile plaque not present.    Aortic Arch:  Size normal.  Dissection not present.  Plaque thickness less than 3 mm.  Mobile plaque not present.    Descending Aorta:  Size normal.  Dissection not present.  Plaque thickness greater than 3 mm.  Mobile plaque not present.          Atria    Right Atrium:  Size normal.  Spontaneous echo contrast not present.   Thrombus not present.  Tumor not present.  Device present.    Left Atrium:  Size normal.  Spontaneous echo contrast not present.  Thrombus not present.  Tumor not present.  Device not present.    Left atrial appendage normal.      Septa    Atrial Septum:  Intra-atrial septal morphology normal.      Ventricular Septum:  Intra-ventricular septum morphology normal.          Other Findings  Pericardium:  normal  Pleural Effusion:  none  Pulmonary Arteries:  normal  Pulmonary Venous Flow:  normal    Anesthesia Information  Performed Personally  Anesthesiologist:  KATY AHYWARD      Echocardiogram Comments:       Informed consent was obtained preoperatively.  Bigg probe passed without difficulty.  Small heart.  Angle of interrogation of AVG suboptimal. Post AVR: 21 mm tissue valve, leaflets open and coapt well, no insufficdiency, small perivalvular leak at interesection of native Lcc AND ncc. EF 60%, no NWMA. Surgeon provided with verbal report of BIGG pre and post procedure.

## 2017-11-10 NOTE — ANESTHESIA PROCEDURE NOTES
Airway  Urgency: elective    Airway not difficult    General Information and Staff    Patient location during procedure: OR  Anesthesiologist: KATY HAYWARD    Indications and Patient Condition  Indications for airway management: airway protection    Preoxygenated: yes  MILS not maintained throughout  Mask difficulty assessment: 0 - not attempted    Final Airway Details  Final airway type: endotracheal airway      Successful airway: ETT  Cuffed: yes   Successful intubation technique: direct laryngoscopy  Endotracheal tube insertion site: oral  Blade: Cintia  Blade size: #3  ETT size: 7.0 mm  Cormack-Lehane Classification: grade I - full view of glottis  Placement verified by: chest auscultation and capnometry   Measured from: lips  ETT to lips (cm): 20  Number of attempts at approach: 1    Additional Comments  Negative epigastric sounds, Breath sound equal bilaterally with symmetric chest rise and fall

## 2017-11-10 NOTE — ANESTHESIA POSTPROCEDURE EVALUATION
Patient: Debbie Rodrigez    Procedure Summary     Date Anesthesia Start Anesthesia Stop Room / Location    11/10/17 [id number] [facility] OR 16 / [facility] OR       Procedure Diagnosis Surgeon Provider    AORTIC VALVE REPAIR/REPLACEMENT, WITH MARYSE (N/A Chest) Nonrheumatic aortic valve stenosis  (Nonrheumatic aortic valve stenosis [I35.0]) MD Navjot Mott MD          Anesthesia Type: general  Last vitals  BP   137/53 (11/10/17 1008)   Temp   97 °F (36.1 °C) (11/10/17 1007)   Pulse   66 (11/10/17 1007)   Resp   18 (11/10/17 1007)     SpO2   99 % (11/10/17 1007)     Post Anesthesia Care and Evaluation    Patient location during evaluation: ICU  Patient participation: complete - patient cannot participate  Level of consciousness: obtunded/minimal responses  Pain score: 0  Pain management: adequate  Airway patency: patent  Anesthetic complications: No anesthetic complications  PONV Status: none  Cardiovascular status: acceptable  Respiratory status: acceptable, ETT, intubated and ventilator  Hydration status: acceptable

## 2017-11-10 NOTE — ANESTHESIA PROCEDURE NOTES
Arterial Line    Patient location during procedure: pre-op   Line placed for hemodynamic monitoring.  Preanesthetic Checklist  Completed: patient identified, site marked, surgical consent, pre-op evaluation, timeout performed, IV checked, risks and benefits discussed and monitors and equipment checked  Arterial Line Prep   Sterile Tech: cap, gloves and sterile barriers  Prep: ChloraPrep  Patient monitoring: blood pressure monitoring, continuous pulse oximetry and EKG  Arterial Line Procedure   Laterality:right  Location:  radial artery  Catheter size: 20 G   Guidance: ultrasound guided and palpation technique  Number of attempts: 2  Successful placement: yes          Post Assessment   Dressing Type: line sutured, occlusive dressing applied, secured with tape and wrist guard applied.   Complications no  Circ/Move/Sens Assessment: normal and unchanged.   Patient Tolerance: patient tolerated the procedure well with no apparent complications

## 2017-11-11 ENCOUNTER — APPOINTMENT (OUTPATIENT)
Dept: GENERAL RADIOLOGY | Facility: HOSPITAL | Age: 77
End: 2017-11-11

## 2017-11-11 PROBLEM — Z95.2 S/P AVR: Status: ACTIVE | Noted: 2017-11-11

## 2017-11-11 PROBLEM — I35.0 NONRHEUMATIC AORTIC VALVE STENOSIS: Status: ACTIVE | Noted: 2017-10-24

## 2017-11-11 LAB
ABO + RH BLD: NORMAL
ALBUMIN SERPL-MCNC: 3.3 G/DL (ref 3.2–4.8)
ANION GAP SERPL CALCULATED.3IONS-SCNC: 5 MMOL/L (ref 3–11)
ARTERIAL PATENCY WRIST A: ABNORMAL
ATMOSPHERIC PRESS: ABNORMAL MMHG
BASE EXCESS BLDA CALC-SCNC: -3.2 MMOL/L (ref 0–2)
BASOPHILS # BLD AUTO: 0.01 10*3/MM3 (ref 0–0.2)
BASOPHILS NFR BLD AUTO: 0.1 % (ref 0–1)
BDY SITE: ABNORMAL
BH BB BLOOD EXPIRATION DATE: NORMAL
BH BB BLOOD TYPE BARCODE: 600
BH BB BLOOD TYPE BARCODE: 6200
BH BB BLOOD TYPE BARCODE: 8400
BH BB DISPENSE STATUS: NORMAL
BH BB PRODUCT CODE: NORMAL
BH BB UNIT NUMBER: NORMAL
BUN BLD-MCNC: 23 MG/DL (ref 9–23)
BUN/CREAT SERPL: 23 (ref 7–25)
CALCIUM SPEC-SCNC: 7.3 MG/DL (ref 8.7–10.4)
CHLORIDE SERPL-SCNC: 112 MMOL/L (ref 99–109)
CO2 BLDA-SCNC: 22.8 MMOL/L (ref 22–33)
CO2 SERPL-SCNC: 21 MMOL/L (ref 20–31)
COHGB MFR BLD: 1.1 % (ref 0–2)
CREAT BLD-MCNC: 1 MG/DL (ref 0.6–1.3)
DEPRECATED RDW RBC AUTO: 52.7 FL (ref 37–54)
DEPRECATED RDW RBC AUTO: 57.4 FL (ref 37–54)
EOSINOPHIL # BLD AUTO: 0.17 10*3/MM3 (ref 0–0.3)
EOSINOPHIL NFR BLD AUTO: 2.4 % (ref 0–3)
ERYTHROCYTE [DISTWIDTH] IN BLOOD BY AUTOMATED COUNT: 16.3 % (ref 11.3–14.5)
ERYTHROCYTE [DISTWIDTH] IN BLOOD BY AUTOMATED COUNT: 17.1 % (ref 11.3–14.5)
GFR SERPL CREATININE-BSD FRML MDRD: 54 ML/MIN/1.73
GLUCOSE BLD-MCNC: 152 MG/DL (ref 70–100)
GLUCOSE BLDC GLUCOMTR-MCNC: 105 MG/DL (ref 70–130)
GLUCOSE BLDC GLUCOMTR-MCNC: 110 MG/DL (ref 70–130)
GLUCOSE BLDC GLUCOMTR-MCNC: 112 MG/DL (ref 70–130)
GLUCOSE BLDC GLUCOMTR-MCNC: 112 MG/DL (ref 70–130)
GLUCOSE BLDC GLUCOMTR-MCNC: 118 MG/DL (ref 70–130)
GLUCOSE BLDC GLUCOMTR-MCNC: 120 MG/DL (ref 70–130)
GLUCOSE BLDC GLUCOMTR-MCNC: 121 MG/DL (ref 70–130)
GLUCOSE BLDC GLUCOMTR-MCNC: 130 MG/DL (ref 70–130)
GLUCOSE BLDC GLUCOMTR-MCNC: 144 MG/DL (ref 70–130)
GLUCOSE BLDC GLUCOMTR-MCNC: 144 MG/DL (ref 70–130)
GLUCOSE BLDC GLUCOMTR-MCNC: 153 MG/DL (ref 70–130)
GLUCOSE BLDC GLUCOMTR-MCNC: 163 MG/DL (ref 70–130)
GLUCOSE BLDC GLUCOMTR-MCNC: 167 MG/DL (ref 70–130)
GLUCOSE BLDC GLUCOMTR-MCNC: 178 MG/DL (ref 70–130)
GLUCOSE BLDC GLUCOMTR-MCNC: 182 MG/DL (ref 70–130)
GLUCOSE BLDC GLUCOMTR-MCNC: 186 MG/DL (ref 70–130)
GLUCOSE BLDC GLUCOMTR-MCNC: 94 MG/DL (ref 70–130)
HCO3 BLDA-SCNC: 21.6 MMOL/L (ref 20–26)
HCT VFR BLD AUTO: 24.9 % (ref 34.5–44)
HCT VFR BLD AUTO: 25.4 % (ref 34.5–44)
HCT VFR BLD AUTO: 27 % (ref 34.5–44)
HCT VFR BLD CALC: 27.3 %
HGB BLD-MCNC: 8.4 G/DL (ref 11.5–15.5)
HGB BLD-MCNC: 8.6 G/DL (ref 11.5–15.5)
HGB BLD-MCNC: 8.8 G/DL (ref 11.5–15.5)
HGB BLDA-MCNC: 8.9 G/DL (ref 14–18)
HOROWITZ INDEX BLD+IHG-RTO: 30 %
IMM GRANULOCYTES # BLD: 0.02 10*3/MM3 (ref 0–0.03)
IMM GRANULOCYTES NFR BLD: 0.3 % (ref 0–0.6)
INR PPP: 1.23
LYMPHOCYTES # BLD AUTO: 0.83 10*3/MM3 (ref 0.6–4.8)
LYMPHOCYTES NFR BLD AUTO: 11.5 % (ref 24–44)
MAGNESIUM SERPL-MCNC: 2.3 MG/DL (ref 1.3–2.7)
MCH RBC QN AUTO: 29.6 PG (ref 27–31)
MCH RBC QN AUTO: 30 PG (ref 27–31)
MCHC RBC AUTO-ENTMCNC: 32.6 G/DL (ref 32–36)
MCHC RBC AUTO-ENTMCNC: 33.7 G/DL (ref 32–36)
MCV RBC AUTO: 88.9 FL (ref 80–99)
MCV RBC AUTO: 90.9 FL (ref 80–99)
METHGB BLD QL: 1.3 % (ref 0–1.5)
MODALITY: ABNORMAL
MONOCYTES # BLD AUTO: 0.78 10*3/MM3 (ref 0–1)
MONOCYTES NFR BLD AUTO: 10.8 % (ref 0–12)
NEUTROPHILS # BLD AUTO: 5.41 10*3/MM3 (ref 1.5–8.3)
NEUTROPHILS NFR BLD AUTO: 74.9 % (ref 41–71)
OXYHGB MFR BLDV: 96.4 % (ref 94–99)
PCO2 BLDA: 37 MM HG (ref 35–45)
PH BLDA: 7.38 PH UNITS (ref 7.35–7.45)
PHOSPHATE SERPL-MCNC: 2.2 MG/DL (ref 2.4–5.1)
PLATELET # BLD AUTO: 145 10*3/MM3 (ref 150–450)
PLATELET # BLD AUTO: 148 10*3/MM3 (ref 150–450)
PMV BLD AUTO: 9.3 FL (ref 6–12)
PMV BLD AUTO: 9.3 FL (ref 6–12)
PO2 BLDA: 116 MM HG (ref 83–108)
POTASSIUM BLD-SCNC: 4.2 MMOL/L (ref 3.5–5.5)
PROTHROMBIN TIME: 13.5 SECONDS (ref 9.6–11.5)
RBC # BLD AUTO: 2.8 10*6/MM3 (ref 3.89–5.14)
RBC # BLD AUTO: 2.97 10*6/MM3 (ref 3.89–5.14)
SODIUM BLD-SCNC: 138 MMOL/L (ref 132–146)
UNIT  ABO: NORMAL
UNIT  RH: NORMAL
WBC NRBC COR # BLD: 7.22 10*3/MM3 (ref 3.5–10.8)
WBC NRBC COR # BLD: 9.8 10*3/MM3 (ref 3.5–10.8)

## 2017-11-11 PROCEDURE — 85610 PROTHROMBIN TIME: CPT | Performed by: PHYSICIAN ASSISTANT

## 2017-11-11 PROCEDURE — 85014 HEMATOCRIT: CPT | Performed by: THORACIC SURGERY (CARDIOTHORACIC VASCULAR SURGERY)

## 2017-11-11 PROCEDURE — 94003 VENT MGMT INPAT SUBQ DAY: CPT

## 2017-11-11 PROCEDURE — 99233 SBSQ HOSP IP/OBS HIGH 50: CPT | Performed by: INTERNAL MEDICINE

## 2017-11-11 PROCEDURE — 85018 HEMOGLOBIN: CPT | Performed by: THORACIC SURGERY (CARDIOTHORACIC VASCULAR SURGERY)

## 2017-11-11 PROCEDURE — 85027 COMPLETE CBC AUTOMATED: CPT | Performed by: THORACIC SURGERY (CARDIOTHORACIC VASCULAR SURGERY)

## 2017-11-11 PROCEDURE — 25010000002 PROPOFOL 1000 MG/ML EMULSION: Performed by: THORACIC SURGERY (CARDIOTHORACIC VASCULAR SURGERY)

## 2017-11-11 PROCEDURE — 94799 UNLISTED PULMONARY SVC/PX: CPT

## 2017-11-11 PROCEDURE — 82805 BLOOD GASES W/O2 SATURATION: CPT | Performed by: THORACIC SURGERY (CARDIOTHORACIC VASCULAR SURGERY)

## 2017-11-11 PROCEDURE — 25010000002 FENTANYL CITRATE (PF) 100 MCG/2ML SOLUTION: Performed by: THORACIC SURGERY (CARDIOTHORACIC VASCULAR SURGERY)

## 2017-11-11 PROCEDURE — 80069 RENAL FUNCTION PANEL: CPT | Performed by: PHYSICIAN ASSISTANT

## 2017-11-11 PROCEDURE — 93005 ELECTROCARDIOGRAM TRACING: CPT | Performed by: PHYSICIAN ASSISTANT

## 2017-11-11 PROCEDURE — 99222 1ST HOSP IP/OBS MODERATE 55: CPT | Performed by: INTERNAL MEDICINE

## 2017-11-11 PROCEDURE — 83735 ASSAY OF MAGNESIUM: CPT | Performed by: PHYSICIAN ASSISTANT

## 2017-11-11 PROCEDURE — 85025 COMPLETE CBC W/AUTO DIFF WBC: CPT | Performed by: PHYSICIAN ASSISTANT

## 2017-11-11 PROCEDURE — 82962 GLUCOSE BLOOD TEST: CPT

## 2017-11-11 PROCEDURE — 71010 HC CHEST PA OR AP: CPT

## 2017-11-11 PROCEDURE — 93010 ELECTROCARDIOGRAM REPORT: CPT | Performed by: INTERNAL MEDICINE

## 2017-11-11 PROCEDURE — 25010000002 VANCOMYCIN PER 500 MG: Performed by: PHYSICIAN ASSISTANT

## 2017-11-11 RX ORDER — RISPERIDONE 1 MG/1
2 TABLET ORAL NIGHTLY
Status: DISCONTINUED | OUTPATIENT
Start: 2017-11-11 | End: 2017-11-13

## 2017-11-11 RX ORDER — LEVOTHYROXINE SODIUM ANHYDROUS 100 UG/5ML
44 INJECTION, POWDER, LYOPHILIZED, FOR SOLUTION INTRAVENOUS ONCE
Status: COMPLETED | OUTPATIENT
Start: 2017-11-11 | End: 2017-11-11

## 2017-11-11 RX ORDER — ALBUMIN, HUMAN INJ 5% 5 %
SOLUTION INTRAVENOUS
Status: DISPENSED
Start: 2017-11-11 | End: 2017-11-11

## 2017-11-11 RX ORDER — NICOTINE POLACRILEX 4 MG
15 LOZENGE BUCCAL
Status: DISCONTINUED | OUTPATIENT
Start: 2017-11-11 | End: 2017-11-21 | Stop reason: HOSPADM

## 2017-11-11 RX ORDER — DEXTROSE MONOHYDRATE 25 G/50ML
25 INJECTION, SOLUTION INTRAVENOUS
Status: DISCONTINUED | OUTPATIENT
Start: 2017-11-11 | End: 2017-11-21 | Stop reason: HOSPADM

## 2017-11-11 RX ADMIN — HYDROCODONE BITARTRATE AND ACETAMINOPHEN 1 TABLET: 7.5; 325 TABLET ORAL at 21:02

## 2017-11-11 RX ADMIN — ASPIRIN 325 MG: 325 TABLET, DELAYED RELEASE ORAL at 11:35

## 2017-11-11 RX ADMIN — PROPOFOL 40 MCG/KG/MIN: 10 INJECTION, EMULSION INTRAVENOUS at 00:50

## 2017-11-11 RX ADMIN — Medication 750 MG: at 02:00

## 2017-11-11 RX ADMIN — BENZTROPINE MESYLATE 1 MG: 1 TABLET ORAL at 21:02

## 2017-11-11 RX ADMIN — Medication 2 TABLET: at 11:36

## 2017-11-11 RX ADMIN — LEVOTHYROXINE SODIUM ANHYDROUS 44 MCG: 100 INJECTION, POWDER, LYOPHILIZED, FOR SOLUTION INTRAVENOUS at 11:37

## 2017-11-11 RX ADMIN — CHLORHEXIDINE GLUCONATE 15 ML: 1.2 RINSE ORAL at 21:01

## 2017-11-11 RX ADMIN — ATORVASTATIN CALCIUM 40 MG: 40 TABLET, FILM COATED ORAL at 21:02

## 2017-11-11 RX ADMIN — FAMOTIDINE 20 MG: 20 TABLET, FILM COATED ORAL at 18:06

## 2017-11-11 RX ADMIN — FENTANYL CITRATE 25 MCG: 50 INJECTION INTRAMUSCULAR; INTRAVENOUS at 08:18

## 2017-11-11 RX ADMIN — HYDROCODONE BITARTRATE AND ACETAMINOPHEN 1 TABLET: 7.5; 325 TABLET ORAL at 11:36

## 2017-11-11 RX ADMIN — FENTANYL CITRATE 25 MCG: 50 INJECTION INTRAMUSCULAR; INTRAVENOUS at 11:30

## 2017-11-11 RX ADMIN — Medication 750 MG: at 14:01

## 2017-11-11 RX ADMIN — FENTANYL CITRATE 25 MCG: 50 INJECTION INTRAMUSCULAR; INTRAVENOUS at 13:43

## 2017-11-11 RX ADMIN — FAMOTIDINE 20 MG: 10 INJECTION, SOLUTION INTRAVENOUS at 11:35

## 2017-11-11 RX ADMIN — RISPERIDONE 2 MG: 1 TABLET ORAL at 21:02

## 2017-11-11 RX ADMIN — Medication 2 TABLET: at 18:06

## 2017-11-12 ENCOUNTER — APPOINTMENT (OUTPATIENT)
Dept: GENERAL RADIOLOGY | Facility: HOSPITAL | Age: 77
End: 2017-11-12

## 2017-11-12 LAB
ANION GAP SERPL CALCULATED.3IONS-SCNC: 4 MMOL/L (ref 3–11)
BACTERIA SPEC AEROBE CULT: NORMAL
BUN BLD-MCNC: 22 MG/DL (ref 9–23)
BUN/CREAT SERPL: 27.5 (ref 7–25)
CALCIUM SPEC-SCNC: 7.6 MG/DL (ref 8.7–10.4)
CHLORIDE SERPL-SCNC: 106 MMOL/L (ref 99–109)
CO2 SERPL-SCNC: 21 MMOL/L (ref 20–31)
CREAT BLD-MCNC: 0.8 MG/DL (ref 0.6–1.3)
DEPRECATED RDW RBC AUTO: 56.2 FL (ref 37–54)
ERYTHROCYTE [DISTWIDTH] IN BLOOD BY AUTOMATED COUNT: 16.9 % (ref 11.3–14.5)
GFR SERPL CREATININE-BSD FRML MDRD: 70 ML/MIN/1.73
GLUCOSE BLD-MCNC: 189 MG/DL (ref 70–100)
GLUCOSE BLDC GLUCOMTR-MCNC: 139 MG/DL (ref 70–130)
GLUCOSE BLDC GLUCOMTR-MCNC: 155 MG/DL (ref 70–130)
GLUCOSE BLDC GLUCOMTR-MCNC: 165 MG/DL (ref 70–130)
GLUCOSE BLDC GLUCOMTR-MCNC: 170 MG/DL (ref 70–130)
HCT VFR BLD AUTO: 24.2 % (ref 34.5–44)
HGB BLD-MCNC: 8 G/DL (ref 11.5–15.5)
MCH RBC QN AUTO: 30 PG (ref 27–31)
MCHC RBC AUTO-ENTMCNC: 33.1 G/DL (ref 32–36)
MCV RBC AUTO: 90.6 FL (ref 80–99)
PLATELET # BLD AUTO: 114 10*3/MM3 (ref 150–450)
PMV BLD AUTO: 9.2 FL (ref 6–12)
POTASSIUM BLD-SCNC: 4.3 MMOL/L (ref 3.5–5.5)
RBC # BLD AUTO: 2.67 10*6/MM3 (ref 3.89–5.14)
SODIUM BLD-SCNC: 131 MMOL/L (ref 132–146)
WBC NRBC COR # BLD: 8.55 10*3/MM3 (ref 3.5–10.8)

## 2017-11-12 PROCEDURE — 93010 ELECTROCARDIOGRAM REPORT: CPT | Performed by: INTERNAL MEDICINE

## 2017-11-12 PROCEDURE — 93005 ELECTROCARDIOGRAM TRACING: CPT | Performed by: PHYSICIAN ASSISTANT

## 2017-11-12 PROCEDURE — 85027 COMPLETE CBC AUTOMATED: CPT | Performed by: PHYSICIAN ASSISTANT

## 2017-11-12 PROCEDURE — 82962 GLUCOSE BLOOD TEST: CPT

## 2017-11-12 PROCEDURE — 93005 ELECTROCARDIOGRAM TRACING: CPT | Performed by: THORACIC SURGERY (CARDIOTHORACIC VASCULAR SURGERY)

## 2017-11-12 PROCEDURE — 97162 PT EVAL MOD COMPLEX 30 MIN: CPT

## 2017-11-12 PROCEDURE — 25010000002 FUROSEMIDE PER 20 MG: Performed by: INTERNAL MEDICINE

## 2017-11-12 PROCEDURE — 25010000002 VANCOMYCIN PER 500 MG: Performed by: PHYSICIAN ASSISTANT

## 2017-11-12 PROCEDURE — 99232 SBSQ HOSP IP/OBS MODERATE 35: CPT | Performed by: INTERNAL MEDICINE

## 2017-11-12 PROCEDURE — 63710000001 INSULIN LISPRO (HUMAN) PER 5 UNITS: Performed by: INTERNAL MEDICINE

## 2017-11-12 PROCEDURE — 25010000002 ONDANSETRON PER 1 MG: Performed by: PHYSICIAN ASSISTANT

## 2017-11-12 PROCEDURE — 80048 BASIC METABOLIC PNL TOTAL CA: CPT | Performed by: PHYSICIAN ASSISTANT

## 2017-11-12 PROCEDURE — 71010 HC CHEST PA OR AP: CPT

## 2017-11-12 PROCEDURE — 99024 POSTOP FOLLOW-UP VISIT: CPT | Performed by: THORACIC SURGERY (CARDIOTHORACIC VASCULAR SURGERY)

## 2017-11-12 RX ORDER — DILTIAZEM HCL IN NACL,ISO-OSM 125 MG/125
5-15 PLASTIC BAG, INJECTION (ML) INTRAVENOUS
Status: DISCONTINUED | OUTPATIENT
Start: 2017-11-12 | End: 2017-11-14 | Stop reason: SDUPTHER

## 2017-11-12 RX ORDER — OXYCODONE HYDROCHLORIDE AND ACETAMINOPHEN 5; 325 MG/1; MG/1
1 TABLET ORAL EVERY 4 HOURS PRN
Status: DISCONTINUED | OUTPATIENT
Start: 2017-11-12 | End: 2017-11-15

## 2017-11-12 RX ORDER — FUROSEMIDE 10 MG/ML
40 INJECTION INTRAMUSCULAR; INTRAVENOUS ONCE
Status: COMPLETED | OUTPATIENT
Start: 2017-11-12 | End: 2017-11-12

## 2017-11-12 RX ADMIN — CHLORHEXIDINE GLUCONATE 15 ML: 1.2 RINSE ORAL at 09:28

## 2017-11-12 RX ADMIN — INSULIN LISPRO 2 UNITS: 100 INJECTION, SOLUTION INTRAVENOUS; SUBCUTANEOUS at 12:13

## 2017-11-12 RX ADMIN — INSULIN LISPRO 2 UNITS: 100 INJECTION, SOLUTION INTRAVENOUS; SUBCUTANEOUS at 09:30

## 2017-11-12 RX ADMIN — ACETAMINOPHEN 650 MG: 325 TABLET ORAL at 16:14

## 2017-11-12 RX ADMIN — Medication 2 TABLET: at 18:33

## 2017-11-12 RX ADMIN — METOPROLOL TARTRATE 25 MG: 25 TABLET ORAL at 19:34

## 2017-11-12 RX ADMIN — FUROSEMIDE 40 MG: 10 INJECTION, SOLUTION INTRAMUSCULAR; INTRAVENOUS at 15:32

## 2017-11-12 RX ADMIN — OXYCODONE AND ACETAMINOPHEN 1 TABLET: 5; 325 TABLET ORAL at 18:33

## 2017-11-12 RX ADMIN — ATORVASTATIN CALCIUM 40 MG: 40 TABLET, FILM COATED ORAL at 19:35

## 2017-11-12 RX ADMIN — DILTIAZEM HCL-SODIUM CHLORIDE IV SOLN 125 MG/125ML-0.9% 5 MG/HR: 125-0.9/125 SOLUTION at 09:29

## 2017-11-12 RX ADMIN — ACETAMINOPHEN 650 MG: 325 TABLET ORAL at 06:32

## 2017-11-12 RX ADMIN — ONDANSETRON 4 MG: 2 INJECTION INTRAMUSCULAR; INTRAVENOUS at 11:04

## 2017-11-12 RX ADMIN — BENZTROPINE MESYLATE 1 MG: 1 TABLET ORAL at 18:33

## 2017-11-12 RX ADMIN — RISPERIDONE 2 MG: 1 TABLET ORAL at 19:33

## 2017-11-12 RX ADMIN — INSULIN LISPRO 2 UNITS: 100 INJECTION, SOLUTION INTRAVENOUS; SUBCUTANEOUS at 17:06

## 2017-11-12 RX ADMIN — ASPIRIN 325 MG: 325 TABLET, DELAYED RELEASE ORAL at 09:28

## 2017-11-12 RX ADMIN — METOPROLOL TARTRATE 12.5 MG: 25 TABLET, FILM COATED ORAL at 09:28

## 2017-11-12 RX ADMIN — BENZTROPINE MESYLATE 1 MG: 1 TABLET ORAL at 09:28

## 2017-11-12 RX ADMIN — FAMOTIDINE 20 MG: 20 TABLET, FILM COATED ORAL at 09:28

## 2017-11-12 RX ADMIN — FAMOTIDINE 20 MG: 20 TABLET, FILM COATED ORAL at 18:33

## 2017-11-12 RX ADMIN — Medication 750 MG: at 02:00

## 2017-11-12 RX ADMIN — Medication 2 TABLET: at 09:28

## 2017-11-12 RX ADMIN — LEVOTHYROXINE SODIUM 88 MCG: 88 TABLET ORAL at 06:27

## 2017-11-13 ENCOUNTER — APPOINTMENT (OUTPATIENT)
Dept: GENERAL RADIOLOGY | Facility: HOSPITAL | Age: 77
End: 2017-11-13

## 2017-11-13 LAB
ABO + RH BLD: NORMAL
ABO + RH BLD: NORMAL
ANION GAP SERPL CALCULATED.3IONS-SCNC: 2 MMOL/L (ref 3–11)
BH BB BLOOD EXPIRATION DATE: NORMAL
BH BB BLOOD EXPIRATION DATE: NORMAL
BH BB BLOOD TYPE BARCODE: 600
BH BB BLOOD TYPE BARCODE: 600
BH BB DISPENSE STATUS: NORMAL
BH BB DISPENSE STATUS: NORMAL
BH BB PRODUCT CODE: NORMAL
BH BB PRODUCT CODE: NORMAL
BH BB UNIT NUMBER: NORMAL
BH BB UNIT NUMBER: NORMAL
BUN BLD-MCNC: 19 MG/DL (ref 9–23)
BUN/CREAT SERPL: 19 (ref 7–25)
CALCIUM SPEC-SCNC: 8.4 MG/DL (ref 8.7–10.4)
CHLORIDE SERPL-SCNC: 106 MMOL/L (ref 99–109)
CO2 SERPL-SCNC: 26 MMOL/L (ref 20–31)
CREAT BLD-MCNC: 1 MG/DL (ref 0.6–1.3)
CYTO UR: NORMAL
DEPRECATED RDW RBC AUTO: 53.7 FL (ref 37–54)
ERYTHROCYTE [DISTWIDTH] IN BLOOD BY AUTOMATED COUNT: 16 % (ref 11.3–14.5)
GFR SERPL CREATININE-BSD FRML MDRD: 54 ML/MIN/1.73
GLUCOSE BLD-MCNC: 112 MG/DL (ref 70–100)
GLUCOSE BLDC GLUCOMTR-MCNC: 115 MG/DL (ref 70–130)
GLUCOSE BLDC GLUCOMTR-MCNC: 125 MG/DL (ref 70–130)
GLUCOSE BLDC GLUCOMTR-MCNC: 130 MG/DL (ref 70–130)
HCT VFR BLD AUTO: 25 % (ref 34.5–44)
HGB BLD-MCNC: 8.3 G/DL (ref 11.5–15.5)
LAB AP CASE REPORT: NORMAL
LAB AP CLINICAL INFORMATION: NORMAL
Lab: NORMAL
MCH RBC QN AUTO: 30.3 PG (ref 27–31)
MCHC RBC AUTO-ENTMCNC: 33.2 G/DL (ref 32–36)
MCV RBC AUTO: 91.2 FL (ref 80–99)
PATH REPORT.FINAL DX SPEC: NORMAL
PATH REPORT.GROSS SPEC: NORMAL
PLATELET # BLD AUTO: 109 10*3/MM3 (ref 150–450)
PMV BLD AUTO: 9.8 FL (ref 6–12)
POTASSIUM BLD-SCNC: 4.4 MMOL/L (ref 3.5–5.5)
RBC # BLD AUTO: 2.74 10*6/MM3 (ref 3.89–5.14)
SODIUM BLD-SCNC: 134 MMOL/L (ref 132–146)
UNIT  ABO: NORMAL
UNIT  ABO: NORMAL
UNIT  RH: NORMAL
UNIT  RH: NORMAL
WBC NRBC COR # BLD: 7.5 10*3/MM3 (ref 3.5–10.8)

## 2017-11-13 PROCEDURE — 99232 SBSQ HOSP IP/OBS MODERATE 35: CPT | Performed by: INTERNAL MEDICINE

## 2017-11-13 PROCEDURE — 25010000002 MANNITOL PER 50 ML

## 2017-11-13 PROCEDURE — 25010000002 PHENYLEPHRINE PER 1 ML

## 2017-11-13 PROCEDURE — 82962 GLUCOSE BLOOD TEST: CPT

## 2017-11-13 PROCEDURE — 99024 POSTOP FOLLOW-UP VISIT: CPT | Performed by: PHYSICIAN ASSISTANT

## 2017-11-13 PROCEDURE — 25010000002 MAGNESIUM SULFATE PER 500 MG OF MAGNESIUM

## 2017-11-13 PROCEDURE — 25010000002 HEPARIN (PORCINE) PER 1000 UNITS

## 2017-11-13 PROCEDURE — 85027 COMPLETE CBC AUTOMATED: CPT | Performed by: PHYSICIAN ASSISTANT

## 2017-11-13 PROCEDURE — 80048 BASIC METABOLIC PNL TOTAL CA: CPT | Performed by: PHYSICIAN ASSISTANT

## 2017-11-13 PROCEDURE — 71010 HC CHEST PA OR AP: CPT

## 2017-11-13 PROCEDURE — 97110 THERAPEUTIC EXERCISES: CPT

## 2017-11-13 RX ORDER — RISPERIDONE 1 MG/1
3 TABLET ORAL NIGHTLY
Status: DISCONTINUED | OUTPATIENT
Start: 2017-11-13 | End: 2017-11-15

## 2017-11-13 RX ORDER — BENZTROPINE MESYLATE 2 MG/1
1 TABLET ORAL 2 TIMES DAILY
COMMUNITY
End: 2018-02-13 | Stop reason: ALTCHOICE

## 2017-11-13 RX ORDER — HYDROCODONE BITARTRATE AND ACETAMINOPHEN 7.5; 325 MG/1; MG/1
1 TABLET ORAL EVERY 4 HOURS PRN
Qty: 30 TABLET | Refills: 0 | Status: SHIPPED | OUTPATIENT
Start: 2017-11-13 | End: 2017-11-20

## 2017-11-13 RX ORDER — FLECAINIDE ACETATE 50 MG/1
50 TABLET ORAL EVERY 12 HOURS SCHEDULED
Status: DISCONTINUED | OUTPATIENT
Start: 2017-11-13 | End: 2017-11-15

## 2017-11-13 RX ORDER — RISPERIDONE 1 MG/1
1 TABLET ORAL NIGHTLY
Status: DISCONTINUED | OUTPATIENT
Start: 2017-11-13 | End: 2017-11-13

## 2017-11-13 RX ORDER — BENZTROPINE MESYLATE 1 MG/1
0.5 TABLET ORAL 2 TIMES DAILY
Status: DISCONTINUED | OUTPATIENT
Start: 2017-11-13 | End: 2017-11-14

## 2017-11-13 RX ORDER — MULTIVITAMIN WITH IRON
250 TABLET ORAL DAILY
COMMUNITY
End: 2018-04-23 | Stop reason: DRUGHIGH

## 2017-11-13 RX ADMIN — BENZTROPINE MESYLATE 0.5 MG: 1 TABLET ORAL at 18:15

## 2017-11-13 RX ADMIN — FLECAINIDE ACETATE 50 MG: 50 TABLET ORAL at 14:11

## 2017-11-13 RX ADMIN — Medication 2 TABLET: at 08:21

## 2017-11-13 RX ADMIN — ACETAMINOPHEN 650 MG: 325 TABLET ORAL at 03:50

## 2017-11-13 RX ADMIN — FAMOTIDINE 20 MG: 20 TABLET, FILM COATED ORAL at 08:21

## 2017-11-13 RX ADMIN — LEVOTHYROXINE SODIUM 88 MCG: 88 TABLET ORAL at 05:36

## 2017-11-13 RX ADMIN — METOPROLOL TARTRATE 12.5 MG: 25 TABLET, FILM COATED ORAL at 21:17

## 2017-11-13 RX ADMIN — ASPIRIN 325 MG: 325 TABLET, DELAYED RELEASE ORAL at 08:21

## 2017-11-13 RX ADMIN — RISPERIDONE 3 MG: 1 TABLET ORAL at 21:17

## 2017-11-13 RX ADMIN — FAMOTIDINE 20 MG: 20 TABLET, FILM COATED ORAL at 18:15

## 2017-11-13 RX ADMIN — ATORVASTATIN CALCIUM 40 MG: 40 TABLET, FILM COATED ORAL at 21:17

## 2017-11-13 RX ADMIN — METOPROLOL TARTRATE 25 MG: 25 TABLET ORAL at 08:22

## 2017-11-13 RX ADMIN — FLECAINIDE ACETATE 50 MG: 50 TABLET ORAL at 21:16

## 2017-11-13 RX ADMIN — BENZTROPINE MESYLATE 1 MG: 1 TABLET ORAL at 08:22

## 2017-11-14 ENCOUNTER — APPOINTMENT (OUTPATIENT)
Dept: GENERAL RADIOLOGY | Facility: HOSPITAL | Age: 77
End: 2017-11-14

## 2017-11-14 LAB
ANION GAP SERPL CALCULATED.3IONS-SCNC: 3 MMOL/L (ref 3–11)
BUN BLD-MCNC: 18 MG/DL (ref 9–23)
BUN/CREAT SERPL: 22.5 (ref 7–25)
CALCIUM SPEC-SCNC: 8.6 MG/DL (ref 8.7–10.4)
CHLORIDE SERPL-SCNC: 107 MMOL/L (ref 99–109)
CO2 SERPL-SCNC: 26 MMOL/L (ref 20–31)
CREAT BLD-MCNC: 0.8 MG/DL (ref 0.6–1.3)
DEPRECATED RDW RBC AUTO: 52.8 FL (ref 37–54)
ERYTHROCYTE [DISTWIDTH] IN BLOOD BY AUTOMATED COUNT: 15.5 % (ref 11.3–14.5)
GFR SERPL CREATININE-BSD FRML MDRD: 70 ML/MIN/1.73
GLUCOSE BLD-MCNC: 134 MG/DL (ref 70–100)
GLUCOSE BLDC GLUCOMTR-MCNC: 127 MG/DL (ref 70–130)
HCT VFR BLD AUTO: 22.9 % (ref 34.5–44)
HGB BLD-MCNC: 7.5 G/DL (ref 11.5–15.5)
MCH RBC QN AUTO: 30.1 PG (ref 27–31)
MCHC RBC AUTO-ENTMCNC: 32.8 G/DL (ref 32–36)
MCV RBC AUTO: 92 FL (ref 80–99)
PLATELET # BLD AUTO: 108 10*3/MM3 (ref 150–450)
PMV BLD AUTO: 10.3 FL (ref 6–12)
POTASSIUM BLD-SCNC: 4.3 MMOL/L (ref 3.5–5.5)
RBC # BLD AUTO: 2.49 10*6/MM3 (ref 3.89–5.14)
SODIUM BLD-SCNC: 136 MMOL/L (ref 132–146)
WBC NRBC COR # BLD: 6.56 10*3/MM3 (ref 3.5–10.8)

## 2017-11-14 PROCEDURE — 99232 SBSQ HOSP IP/OBS MODERATE 35: CPT | Performed by: INTERNAL MEDICINE

## 2017-11-14 PROCEDURE — 97110 THERAPEUTIC EXERCISES: CPT

## 2017-11-14 PROCEDURE — 93010 ELECTROCARDIOGRAM REPORT: CPT | Performed by: INTERNAL MEDICINE

## 2017-11-14 PROCEDURE — 93005 ELECTROCARDIOGRAM TRACING: CPT | Performed by: PHYSICIAN ASSISTANT

## 2017-11-14 PROCEDURE — 85027 COMPLETE CBC AUTOMATED: CPT | Performed by: THORACIC SURGERY (CARDIOTHORACIC VASCULAR SURGERY)

## 2017-11-14 PROCEDURE — 82962 GLUCOSE BLOOD TEST: CPT

## 2017-11-14 PROCEDURE — 93005 ELECTROCARDIOGRAM TRACING: CPT | Performed by: INTERNAL MEDICINE

## 2017-11-14 PROCEDURE — 80048 BASIC METABOLIC PNL TOTAL CA: CPT | Performed by: PHYSICIAN ASSISTANT

## 2017-11-14 PROCEDURE — 71010 HC CHEST PA OR AP: CPT

## 2017-11-14 PROCEDURE — 94799 UNLISTED PULMONARY SVC/PX: CPT

## 2017-11-14 RX ORDER — BENZTROPINE MESYLATE 1 MG/1
2 TABLET ORAL 2 TIMES DAILY
Status: DISCONTINUED | OUTPATIENT
Start: 2017-11-14 | End: 2017-11-14

## 2017-11-14 RX ORDER — SENNA AND DOCUSATE SODIUM 50; 8.6 MG/1; MG/1
2 TABLET, FILM COATED ORAL 2 TIMES DAILY PRN
Status: DISCONTINUED | OUTPATIENT
Start: 2017-11-14 | End: 2017-11-21 | Stop reason: HOSPADM

## 2017-11-14 RX ORDER — CARVEDILOL 6.25 MG/1
6.25 TABLET ORAL 2 TIMES DAILY WITH MEALS
Status: DISCONTINUED | OUTPATIENT
Start: 2017-11-14 | End: 2017-11-17

## 2017-11-14 RX ORDER — BENZTROPINE MESYLATE 1 MG/1
1 TABLET ORAL 2 TIMES DAILY
Status: DISCONTINUED | OUTPATIENT
Start: 2017-11-14 | End: 2017-11-21 | Stop reason: HOSPADM

## 2017-11-14 RX ORDER — DILTIAZEM HCL IN NACL,ISO-OSM 125 MG/125
5-15 PLASTIC BAG, INJECTION (ML) INTRAVENOUS
Status: DISCONTINUED | OUTPATIENT
Start: 2017-11-14 | End: 2017-11-17

## 2017-11-14 RX ADMIN — CARVEDILOL 6.25 MG: 6.25 TABLET, FILM COATED ORAL at 17:00

## 2017-11-14 RX ADMIN — ACETAMINOPHEN 650 MG: 325 TABLET ORAL at 16:58

## 2017-11-14 RX ADMIN — METOPROLOL TARTRATE 12.5 MG: 25 TABLET, FILM COATED ORAL at 08:23

## 2017-11-14 RX ADMIN — LEVOTHYROXINE SODIUM 88 MCG: 88 TABLET ORAL at 08:23

## 2017-11-14 RX ADMIN — ASPIRIN 325 MG: 325 TABLET, DELAYED RELEASE ORAL at 08:23

## 2017-11-14 RX ADMIN — FLECAINIDE ACETATE 50 MG: 50 TABLET ORAL at 08:24

## 2017-11-14 RX ADMIN — BENZTROPINE MESYLATE 0.5 MG: 1 TABLET ORAL at 08:23

## 2017-11-14 RX ADMIN — RISPERIDONE 3 MG: 1 TABLET ORAL at 21:30

## 2017-11-14 RX ADMIN — FAMOTIDINE 20 MG: 20 TABLET, FILM COATED ORAL at 08:22

## 2017-11-14 RX ADMIN — FLECAINIDE ACETATE 50 MG: 50 TABLET ORAL at 18:20

## 2017-11-14 RX ADMIN — BENZTROPINE MESYLATE 1 MG: 1 TABLET ORAL at 17:00

## 2017-11-14 RX ADMIN — DILTIAZEM HCL-SODIUM CHLORIDE IV SOLN 125 MG/125ML-0.9% 3 MG/HR: 125-0.9/125 SOLUTION at 21:28

## 2017-11-14 RX ADMIN — ATORVASTATIN CALCIUM 40 MG: 40 TABLET, FILM COATED ORAL at 21:30

## 2017-11-14 RX ADMIN — FAMOTIDINE 20 MG: 20 TABLET, FILM COATED ORAL at 17:00

## 2017-11-15 ENCOUNTER — APPOINTMENT (OUTPATIENT)
Dept: GENERAL RADIOLOGY | Facility: HOSPITAL | Age: 77
End: 2017-11-15

## 2017-11-15 PROCEDURE — 99232 SBSQ HOSP IP/OBS MODERATE 35: CPT | Performed by: INTERNAL MEDICINE

## 2017-11-15 PROCEDURE — 97110 THERAPEUTIC EXERCISES: CPT

## 2017-11-15 PROCEDURE — 99024 POSTOP FOLLOW-UP VISIT: CPT | Performed by: PHYSICIAN ASSISTANT

## 2017-11-15 PROCEDURE — 71010 HC CHEST PA OR AP: CPT

## 2017-11-15 RX ORDER — HYDROCODONE BITARTRATE AND ACETAMINOPHEN 5; 325 MG/1; MG/1
1 TABLET ORAL EVERY 6 HOURS PRN
Status: DISCONTINUED | OUTPATIENT
Start: 2017-11-15 | End: 2017-11-21 | Stop reason: HOSPADM

## 2017-11-15 RX ORDER — FLECAINIDE ACETATE 50 MG/1
100 TABLET ORAL EVERY 12 HOURS SCHEDULED
Status: DISCONTINUED | OUTPATIENT
Start: 2017-11-15 | End: 2017-11-15 | Stop reason: SDUPTHER

## 2017-11-15 RX ORDER — RISPERIDONE 1 MG/1
2 TABLET ORAL ONCE
Status: COMPLETED | OUTPATIENT
Start: 2017-11-15 | End: 2017-11-15

## 2017-11-15 RX ORDER — FLECAINIDE ACETATE 100 MG/1
100 TABLET ORAL EVERY 12 HOURS SCHEDULED
Status: DISCONTINUED | OUTPATIENT
Start: 2017-11-15 | End: 2017-11-19 | Stop reason: CLARIF

## 2017-11-15 RX ORDER — RISPERIDONE 1 MG/1
4 TABLET ORAL NIGHTLY
Status: DISCONTINUED | OUTPATIENT
Start: 2017-11-15 | End: 2017-11-21 | Stop reason: HOSPADM

## 2017-11-15 RX ADMIN — CARVEDILOL 6.25 MG: 6.25 TABLET, FILM COATED ORAL at 17:18

## 2017-11-15 RX ADMIN — FLECAINIDE ACETATE 50 MG: 50 TABLET ORAL at 08:31

## 2017-11-15 RX ADMIN — ASPIRIN 325 MG: 325 TABLET, DELAYED RELEASE ORAL at 07:41

## 2017-11-15 RX ADMIN — BENZTROPINE MESYLATE 1 MG: 1 TABLET ORAL at 12:12

## 2017-11-15 RX ADMIN — DILTIAZEM HCL-SODIUM CHLORIDE IV SOLN 125 MG/125ML-0.9% 5 MG/HR: 125-0.9/125 SOLUTION at 23:47

## 2017-11-15 RX ADMIN — RISPERIDONE 2 MG: 1 TABLET ORAL at 12:13

## 2017-11-15 RX ADMIN — FAMOTIDINE 20 MG: 20 TABLET, FILM COATED ORAL at 07:41

## 2017-11-15 RX ADMIN — FAMOTIDINE 20 MG: 20 TABLET, FILM COATED ORAL at 17:18

## 2017-11-15 RX ADMIN — BENZTROPINE MESYLATE 1 MG: 1 TABLET ORAL at 17:17

## 2017-11-15 RX ADMIN — LEVOTHYROXINE SODIUM 88 MCG: 88 TABLET ORAL at 07:40

## 2017-11-15 RX ADMIN — CARVEDILOL 6.25 MG: 6.25 TABLET, FILM COATED ORAL at 07:40

## 2017-11-15 RX ADMIN — RISPERIDONE 4 MG: 1 TABLET ORAL at 23:18

## 2017-11-15 RX ADMIN — FLECAINIDE ACETATE 100 MG: 100 TABLET ORAL at 23:18

## 2017-11-15 RX ADMIN — ATORVASTATIN CALCIUM 40 MG: 40 TABLET, FILM COATED ORAL at 23:17

## 2017-11-16 ENCOUNTER — APPOINTMENT (OUTPATIENT)
Dept: GENERAL RADIOLOGY | Facility: HOSPITAL | Age: 77
End: 2017-11-16

## 2017-11-16 LAB
DEPRECATED RDW RBC AUTO: 51.4 FL (ref 37–54)
ERYTHROCYTE [DISTWIDTH] IN BLOOD BY AUTOMATED COUNT: 15.1 % (ref 11.3–14.5)
HCT VFR BLD AUTO: 22.1 % (ref 34.5–44)
HGB BLD-MCNC: 7.1 G/DL (ref 11.5–15.5)
MCH RBC QN AUTO: 29.6 PG (ref 27–31)
MCHC RBC AUTO-ENTMCNC: 32.1 G/DL (ref 32–36)
MCV RBC AUTO: 92.1 FL (ref 80–99)
PLATELET # BLD AUTO: 159 10*3/MM3 (ref 150–450)
PMV BLD AUTO: 9.4 FL (ref 6–12)
RBC # BLD AUTO: 2.4 10*6/MM3 (ref 3.89–5.14)
WBC NRBC COR # BLD: 8.28 10*3/MM3 (ref 3.5–10.8)

## 2017-11-16 PROCEDURE — 97110 THERAPEUTIC EXERCISES: CPT

## 2017-11-16 PROCEDURE — 99232 SBSQ HOSP IP/OBS MODERATE 35: CPT | Performed by: INTERNAL MEDICINE

## 2017-11-16 PROCEDURE — 97116 GAIT TRAINING THERAPY: CPT

## 2017-11-16 PROCEDURE — 99233 SBSQ HOSP IP/OBS HIGH 50: CPT | Performed by: NURSE PRACTITIONER

## 2017-11-16 PROCEDURE — 71010 HC CHEST PA OR AP: CPT

## 2017-11-16 PROCEDURE — 85027 COMPLETE CBC AUTOMATED: CPT | Performed by: PHYSICIAN ASSISTANT

## 2017-11-16 RX ORDER — DOCUSATE SODIUM 100 MG/1
100 CAPSULE, LIQUID FILLED ORAL 2 TIMES DAILY
Status: DISCONTINUED | OUTPATIENT
Start: 2017-11-16 | End: 2017-11-21 | Stop reason: HOSPADM

## 2017-11-16 RX ADMIN — BISACODYL 10 MG: 5 TABLET, COATED ORAL at 21:35

## 2017-11-16 RX ADMIN — ASPIRIN 325 MG: 325 TABLET, DELAYED RELEASE ORAL at 09:53

## 2017-11-16 RX ADMIN — ACETAMINOPHEN 650 MG: 325 TABLET ORAL at 19:12

## 2017-11-16 RX ADMIN — FAMOTIDINE 20 MG: 20 TABLET, FILM COATED ORAL at 18:11

## 2017-11-16 RX ADMIN — DOCUSATE SODIUM 100 MG: 100 CAPSULE, LIQUID FILLED ORAL at 18:11

## 2017-11-16 RX ADMIN — CARVEDILOL 6.25 MG: 6.25 TABLET, FILM COATED ORAL at 09:53

## 2017-11-16 RX ADMIN — BENZTROPINE MESYLATE 1 MG: 1 TABLET ORAL at 12:06

## 2017-11-16 RX ADMIN — CARVEDILOL 6.25 MG: 6.25 TABLET, FILM COATED ORAL at 18:12

## 2017-11-16 RX ADMIN — LEVOTHYROXINE SODIUM 88 MCG: 88 TABLET ORAL at 06:23

## 2017-11-16 RX ADMIN — DILTIAZEM HYDROCHLORIDE 30 MG: 30 TABLET, FILM COATED ORAL at 23:26

## 2017-11-16 RX ADMIN — ATORVASTATIN CALCIUM 40 MG: 40 TABLET, FILM COATED ORAL at 21:21

## 2017-11-16 RX ADMIN — RISPERIDONE 4 MG: 1 TABLET ORAL at 21:21

## 2017-11-16 RX ADMIN — ACETAMINOPHEN 650 MG: 325 TABLET ORAL at 06:23

## 2017-11-16 RX ADMIN — BENZTROPINE MESYLATE 1 MG: 1 TABLET ORAL at 19:11

## 2017-11-16 RX ADMIN — ACETAMINOPHEN 650 MG: 325 TABLET ORAL at 14:28

## 2017-11-16 RX ADMIN — DILTIAZEM HYDROCHLORIDE 30 MG: 30 TABLET, FILM COATED ORAL at 12:06

## 2017-11-16 RX ADMIN — FLECAINIDE ACETATE 100 MG: 100 TABLET ORAL at 09:55

## 2017-11-16 RX ADMIN — FAMOTIDINE 20 MG: 20 TABLET, FILM COATED ORAL at 09:53

## 2017-11-16 RX ADMIN — FLECAINIDE ACETATE 100 MG: 100 TABLET ORAL at 21:21

## 2017-11-16 RX ADMIN — DOCUSATE SODIUM 100 MG: 100 CAPSULE, LIQUID FILLED ORAL at 12:06

## 2017-11-16 RX ADMIN — DILTIAZEM HYDROCHLORIDE 30 MG: 30 TABLET, FILM COATED ORAL at 18:11

## 2017-11-17 ENCOUNTER — APPOINTMENT (OUTPATIENT)
Dept: GENERAL RADIOLOGY | Facility: HOSPITAL | Age: 77
End: 2017-11-17

## 2017-11-17 LAB
DEPRECATED RDW RBC AUTO: 51.9 FL (ref 37–54)
ERYTHROCYTE [DISTWIDTH] IN BLOOD BY AUTOMATED COUNT: 15.1 % (ref 11.3–14.5)
HCT VFR BLD AUTO: 22.4 % (ref 34.5–44)
HGB BLD-MCNC: 7.2 G/DL (ref 11.5–15.5)
MCH RBC QN AUTO: 29.9 PG (ref 27–31)
MCHC RBC AUTO-ENTMCNC: 32.1 G/DL (ref 32–36)
MCV RBC AUTO: 92.9 FL (ref 80–99)
PLATELET # BLD AUTO: 202 10*3/MM3 (ref 150–450)
PMV BLD AUTO: 8.9 FL (ref 6–12)
RBC # BLD AUTO: 2.41 10*6/MM3 (ref 3.89–5.14)
WBC NRBC COR # BLD: 7.39 10*3/MM3 (ref 3.5–10.8)

## 2017-11-17 PROCEDURE — 99024 POSTOP FOLLOW-UP VISIT: CPT | Performed by: PHYSICIAN ASSISTANT

## 2017-11-17 PROCEDURE — 85027 COMPLETE CBC AUTOMATED: CPT | Performed by: PHYSICIAN ASSISTANT

## 2017-11-17 PROCEDURE — 99232 SBSQ HOSP IP/OBS MODERATE 35: CPT | Performed by: NURSE PRACTITIONER

## 2017-11-17 PROCEDURE — 71010 HC CHEST PA OR AP: CPT

## 2017-11-17 PROCEDURE — 99232 SBSQ HOSP IP/OBS MODERATE 35: CPT | Performed by: INTERNAL MEDICINE

## 2017-11-17 RX ORDER — DILTIAZEM HYDROCHLORIDE 180 MG/1
180 CAPSULE, COATED, EXTENDED RELEASE ORAL
Status: DISCONTINUED | OUTPATIENT
Start: 2017-11-18 | End: 2017-11-21 | Stop reason: HOSPADM

## 2017-11-17 RX ORDER — DILTIAZEM HYDROCHLORIDE 180 MG/1
180 CAPSULE, COATED, EXTENDED RELEASE ORAL
Qty: 90 CAPSULE | Refills: 4 | Status: CANCELLED | OUTPATIENT
Start: 2017-11-18

## 2017-11-17 RX ORDER — FLECAINIDE ACETATE 100 MG/1
100 TABLET ORAL EVERY 12 HOURS SCHEDULED
Qty: 180 TABLET | Refills: 4 | Status: CANCELLED | OUTPATIENT
Start: 2017-11-17

## 2017-11-17 RX ADMIN — FLECAINIDE ACETATE 100 MG: 100 TABLET ORAL at 09:46

## 2017-11-17 RX ADMIN — FLECAINIDE ACETATE 100 MG: 100 TABLET ORAL at 22:57

## 2017-11-17 RX ADMIN — FAMOTIDINE 20 MG: 20 TABLET, FILM COATED ORAL at 09:46

## 2017-11-17 RX ADMIN — LEVOTHYROXINE SODIUM 88 MCG: 88 TABLET ORAL at 06:54

## 2017-11-17 RX ADMIN — BENZTROPINE MESYLATE 1 MG: 1 TABLET ORAL at 17:54

## 2017-11-17 RX ADMIN — CARVEDILOL 6.25 MG: 6.25 TABLET, FILM COATED ORAL at 09:53

## 2017-11-17 RX ADMIN — DOCUSATE SODIUM 100 MG: 100 CAPSULE, LIQUID FILLED ORAL at 17:53

## 2017-11-17 RX ADMIN — DILTIAZEM HYDROCHLORIDE 30 MG: 30 TABLET, FILM COATED ORAL at 06:54

## 2017-11-17 RX ADMIN — RISPERIDONE 4 MG: 1 TABLET ORAL at 22:39

## 2017-11-17 RX ADMIN — ACETAMINOPHEN 650 MG: 325 TABLET ORAL at 22:39

## 2017-11-17 RX ADMIN — FAMOTIDINE 20 MG: 20 TABLET, FILM COATED ORAL at 17:53

## 2017-11-17 RX ADMIN — DOCUSATE SODIUM 100 MG: 100 CAPSULE, LIQUID FILLED ORAL at 09:46

## 2017-11-17 RX ADMIN — ASPIRIN 325 MG: 325 TABLET, DELAYED RELEASE ORAL at 09:46

## 2017-11-17 RX ADMIN — ATORVASTATIN CALCIUM 40 MG: 40 TABLET, FILM COATED ORAL at 22:39

## 2017-11-17 RX ADMIN — BISACODYL 10 MG: 10 SUPPOSITORY RECTAL at 11:55

## 2017-11-17 RX ADMIN — BENZTROPINE MESYLATE 1 MG: 1 TABLET ORAL at 09:46

## 2017-11-18 ENCOUNTER — APPOINTMENT (OUTPATIENT)
Dept: GENERAL RADIOLOGY | Facility: HOSPITAL | Age: 77
End: 2017-11-18

## 2017-11-18 PROCEDURE — 97110 THERAPEUTIC EXERCISES: CPT

## 2017-11-18 PROCEDURE — 93010 ELECTROCARDIOGRAM REPORT: CPT | Performed by: INTERNAL MEDICINE

## 2017-11-18 PROCEDURE — 71010 HC CHEST PA OR AP: CPT

## 2017-11-18 PROCEDURE — 93005 ELECTROCARDIOGRAM TRACING: CPT | Performed by: INTERNAL MEDICINE

## 2017-11-18 PROCEDURE — 97116 GAIT TRAINING THERAPY: CPT

## 2017-11-18 PROCEDURE — 99232 SBSQ HOSP IP/OBS MODERATE 35: CPT | Performed by: HOSPITALIST

## 2017-11-18 PROCEDURE — 99024 POSTOP FOLLOW-UP VISIT: CPT | Performed by: PHYSICIAN ASSISTANT

## 2017-11-18 RX ADMIN — BENZTROPINE MESYLATE 1 MG: 1 TABLET ORAL at 18:28

## 2017-11-18 RX ADMIN — FLECAINIDE ACETATE 100 MG: 100 TABLET ORAL at 12:50

## 2017-11-18 RX ADMIN — DILTIAZEM HYDROCHLORIDE 180 MG: 180 CAPSULE, COATED, EXTENDED RELEASE ORAL at 08:00

## 2017-11-18 RX ADMIN — FAMOTIDINE 20 MG: 20 TABLET, FILM COATED ORAL at 08:00

## 2017-11-18 RX ADMIN — BENZTROPINE MESYLATE 1 MG: 1 TABLET ORAL at 08:00

## 2017-11-18 RX ADMIN — ATORVASTATIN CALCIUM 40 MG: 40 TABLET, FILM COATED ORAL at 20:04

## 2017-11-18 RX ADMIN — ASPIRIN 325 MG: 325 TABLET, DELAYED RELEASE ORAL at 07:59

## 2017-11-18 RX ADMIN — DOCUSATE SODIUM 100 MG: 100 CAPSULE, LIQUID FILLED ORAL at 08:00

## 2017-11-18 RX ADMIN — FLECAINIDE ACETATE 100 MG: 100 TABLET ORAL at 20:04

## 2017-11-18 RX ADMIN — FAMOTIDINE 20 MG: 20 TABLET, FILM COATED ORAL at 18:28

## 2017-11-18 RX ADMIN — DOCUSATE SODIUM 100 MG: 100 CAPSULE, LIQUID FILLED ORAL at 18:29

## 2017-11-18 RX ADMIN — LEVOTHYROXINE SODIUM 88 MCG: 88 TABLET ORAL at 05:49

## 2017-11-18 RX ADMIN — RISPERIDONE 4 MG: 1 TABLET ORAL at 20:04

## 2017-11-19 ENCOUNTER — APPOINTMENT (OUTPATIENT)
Dept: GENERAL RADIOLOGY | Facility: HOSPITAL | Age: 77
End: 2017-11-19

## 2017-11-19 LAB
ANION GAP SERPL CALCULATED.3IONS-SCNC: 6 MMOL/L (ref 3–11)
BILIRUB CONJ SERPL-MCNC: 0.2 MG/DL (ref 0–0.2)
BILIRUB INDIRECT SERPL-MCNC: 0.3 MG/DL (ref 0.1–1.1)
BILIRUB SERPL-MCNC: 0.5 MG/DL (ref 0.3–1.2)
BNP SERPL-MCNC: 508 PG/ML (ref 0–100)
BUN BLD-MCNC: 21 MG/DL (ref 9–23)
BUN/CREAT SERPL: 26.3 (ref 7–25)
CALCIUM SPEC-SCNC: 8 MG/DL (ref 8.7–10.4)
CHLORIDE SERPL-SCNC: 109 MMOL/L (ref 99–109)
CO2 SERPL-SCNC: 25 MMOL/L (ref 20–31)
CREAT BLD-MCNC: 0.8 MG/DL (ref 0.6–1.3)
DEPRECATED RDW RBC AUTO: 54.2 FL (ref 37–54)
ERYTHROCYTE [DISTWIDTH] IN BLOOD BY AUTOMATED COUNT: 15.9 % (ref 11.3–14.5)
FERRITIN SERPL-MCNC: 254 NG/ML (ref 10–291)
GFR SERPL CREATININE-BSD FRML MDRD: 70 ML/MIN/1.73
GLUCOSE BLD-MCNC: 113 MG/DL (ref 70–100)
HCT VFR BLD AUTO: 22.7 % (ref 34.5–44)
HGB BLD-MCNC: 7.3 G/DL (ref 11.5–15.5)
INR PPP: 1.04
IRON 24H UR-MRATE: 28 MCG/DL (ref 50–175)
IRON SATN MFR SERPL: 14 % (ref 15–50)
LDH SERPL-CCNC: 284 U/L (ref 120–246)
MCH RBC QN AUTO: 30.3 PG (ref 27–31)
MCHC RBC AUTO-ENTMCNC: 32.2 G/DL (ref 32–36)
MCV RBC AUTO: 94.2 FL (ref 80–99)
PLATELET # BLD AUTO: 323 10*3/MM3 (ref 150–450)
PMV BLD AUTO: 8.7 FL (ref 6–12)
POTASSIUM BLD-SCNC: 4.2 MMOL/L (ref 3.5–5.5)
PROTHROMBIN TIME: 11.4 SECONDS (ref 9.6–11.5)
RBC # BLD AUTO: 2.41 10*6/MM3 (ref 3.89–5.14)
RETICS/RBC NFR AUTO: 5.72 % (ref 0.5–1.5)
SODIUM BLD-SCNC: 140 MMOL/L (ref 132–146)
TIBC SERPL-MCNC: 201 MCG/DL (ref 250–450)
WBC NRBC COR # BLD: 10.32 10*3/MM3 (ref 3.5–10.8)

## 2017-11-19 PROCEDURE — 82728 ASSAY OF FERRITIN: CPT | Performed by: HOSPITALIST

## 2017-11-19 PROCEDURE — 71010 HC CHEST PA OR AP: CPT

## 2017-11-19 PROCEDURE — 99231 SBSQ HOSP IP/OBS SF/LOW 25: CPT | Performed by: NURSE PRACTITIONER

## 2017-11-19 PROCEDURE — 85610 PROTHROMBIN TIME: CPT | Performed by: HOSPITALIST

## 2017-11-19 PROCEDURE — 83540 ASSAY OF IRON: CPT | Performed by: HOSPITALIST

## 2017-11-19 PROCEDURE — 83880 ASSAY OF NATRIURETIC PEPTIDE: CPT | Performed by: HOSPITALIST

## 2017-11-19 PROCEDURE — 83550 IRON BINDING TEST: CPT | Performed by: HOSPITALIST

## 2017-11-19 PROCEDURE — 85027 COMPLETE CBC AUTOMATED: CPT | Performed by: HOSPITALIST

## 2017-11-19 PROCEDURE — 85045 AUTOMATED RETICULOCYTE COUNT: CPT | Performed by: HOSPITALIST

## 2017-11-19 PROCEDURE — 82247 BILIRUBIN TOTAL: CPT | Performed by: HOSPITALIST

## 2017-11-19 PROCEDURE — 83010 ASSAY OF HAPTOGLOBIN QUANT: CPT | Performed by: HOSPITALIST

## 2017-11-19 PROCEDURE — 99024 POSTOP FOLLOW-UP VISIT: CPT | Performed by: PHYSICIAN ASSISTANT

## 2017-11-19 PROCEDURE — 83615 LACTATE (LD) (LDH) ENZYME: CPT | Performed by: HOSPITALIST

## 2017-11-19 PROCEDURE — 80048 BASIC METABOLIC PNL TOTAL CA: CPT | Performed by: HOSPITALIST

## 2017-11-19 PROCEDURE — 94799 UNLISTED PULMONARY SVC/PX: CPT

## 2017-11-19 PROCEDURE — 82248 BILIRUBIN DIRECT: CPT | Performed by: HOSPITALIST

## 2017-11-19 RX ORDER — FLECAINIDE ACETATE 50 MG/1
100 TABLET ORAL EVERY 12 HOURS SCHEDULED
Status: DISCONTINUED | OUTPATIENT
Start: 2017-11-19 | End: 2017-11-21 | Stop reason: HOSPADM

## 2017-11-19 RX ADMIN — DOCUSATE SODIUM 100 MG: 100 CAPSULE, LIQUID FILLED ORAL at 18:47

## 2017-11-19 RX ADMIN — ATORVASTATIN CALCIUM 40 MG: 40 TABLET, FILM COATED ORAL at 20:16

## 2017-11-19 RX ADMIN — FLECAINIDE ACETATE 100 MG: 50 TABLET ORAL at 20:16

## 2017-11-19 RX ADMIN — FAMOTIDINE 20 MG: 20 TABLET, FILM COATED ORAL at 18:47

## 2017-11-19 RX ADMIN — ASPIRIN 325 MG: 325 TABLET, DELAYED RELEASE ORAL at 09:08

## 2017-11-19 RX ADMIN — DILTIAZEM HYDROCHLORIDE 180 MG: 180 CAPSULE, COATED, EXTENDED RELEASE ORAL at 09:07

## 2017-11-19 RX ADMIN — RISPERIDONE 4 MG: 1 TABLET ORAL at 20:16

## 2017-11-19 RX ADMIN — FLECAINIDE ACETATE 100 MG: 100 TABLET ORAL at 11:45

## 2017-11-19 RX ADMIN — FAMOTIDINE 20 MG: 20 TABLET, FILM COATED ORAL at 09:08

## 2017-11-19 RX ADMIN — LEVOTHYROXINE SODIUM 88 MCG: 88 TABLET ORAL at 05:02

## 2017-11-19 RX ADMIN — BENZTROPINE MESYLATE 1 MG: 1 TABLET ORAL at 11:45

## 2017-11-19 RX ADMIN — DOCUSATE SODIUM 100 MG: 100 CAPSULE, LIQUID FILLED ORAL at 09:08

## 2017-11-19 RX ADMIN — BENZTROPINE MESYLATE 1 MG: 1 TABLET ORAL at 18:47

## 2017-11-20 ENCOUNTER — APPOINTMENT (OUTPATIENT)
Dept: GENERAL RADIOLOGY | Facility: HOSPITAL | Age: 77
End: 2017-11-20

## 2017-11-20 LAB
ANION GAP SERPL CALCULATED.3IONS-SCNC: 6 MMOL/L (ref 3–11)
BUN BLD-MCNC: 22 MG/DL (ref 9–23)
BUN/CREAT SERPL: 27.5 (ref 7–25)
CALCIUM SPEC-SCNC: 8.4 MG/DL (ref 8.7–10.4)
CHLORIDE SERPL-SCNC: 109 MMOL/L (ref 99–109)
CO2 SERPL-SCNC: 24 MMOL/L (ref 20–31)
CREAT BLD-MCNC: 0.8 MG/DL (ref 0.6–1.3)
DEPRECATED RDW RBC AUTO: 53.5 FL (ref 37–54)
ERYTHROCYTE [DISTWIDTH] IN BLOOD BY AUTOMATED COUNT: 15.8 % (ref 11.3–14.5)
GFR SERPL CREATININE-BSD FRML MDRD: 70 ML/MIN/1.73
GLUCOSE BLD-MCNC: 121 MG/DL (ref 70–100)
HAPTOGLOB SERPL-MCNC: 105 MG/DL (ref 34–200)
HCT VFR BLD AUTO: 22.9 % (ref 34.5–44)
HGB BLD-MCNC: 7.2 G/DL (ref 11.5–15.5)
MCH RBC QN AUTO: 29.9 PG (ref 27–31)
MCHC RBC AUTO-ENTMCNC: 31.4 G/DL (ref 32–36)
MCV RBC AUTO: 95 FL (ref 80–99)
PLATELET # BLD AUTO: 381 10*3/MM3 (ref 150–450)
PMV BLD AUTO: 8.6 FL (ref 6–12)
POTASSIUM BLD-SCNC: 4.7 MMOL/L (ref 3.5–5.5)
RBC # BLD AUTO: 2.41 10*6/MM3 (ref 3.89–5.14)
SODIUM BLD-SCNC: 139 MMOL/L (ref 132–146)
WBC NRBC COR # BLD: 10.58 10*3/MM3 (ref 3.5–10.8)

## 2017-11-20 PROCEDURE — 99232 SBSQ HOSP IP/OBS MODERATE 35: CPT | Performed by: INTERNAL MEDICINE

## 2017-11-20 PROCEDURE — 71010 HC CHEST PA OR AP: CPT

## 2017-11-20 PROCEDURE — 99024 POSTOP FOLLOW-UP VISIT: CPT | Performed by: PHYSICIAN ASSISTANT

## 2017-11-20 PROCEDURE — 85027 COMPLETE CBC AUTOMATED: CPT | Performed by: NURSE PRACTITIONER

## 2017-11-20 PROCEDURE — 94799 UNLISTED PULMONARY SVC/PX: CPT

## 2017-11-20 PROCEDURE — 80048 BASIC METABOLIC PNL TOTAL CA: CPT | Performed by: NURSE PRACTITIONER

## 2017-11-20 PROCEDURE — 25010000002 NA FERRIC GLUC CPLX PER 12.5 MG: Performed by: INTERNAL MEDICINE

## 2017-11-20 RX ADMIN — FLECAINIDE ACETATE 100 MG: 50 TABLET ORAL at 08:51

## 2017-11-20 RX ADMIN — ASPIRIN 325 MG: 325 TABLET, DELAYED RELEASE ORAL at 08:51

## 2017-11-20 RX ADMIN — ATORVASTATIN CALCIUM 40 MG: 40 TABLET, FILM COATED ORAL at 21:23

## 2017-11-20 RX ADMIN — FAMOTIDINE 20 MG: 20 TABLET, FILM COATED ORAL at 08:51

## 2017-11-20 RX ADMIN — SODIUM CHLORIDE 125 MG: 0.9 INJECTION, SOLUTION INTRAVENOUS at 14:42

## 2017-11-20 RX ADMIN — DOCUSATE SODIUM 100 MG: 100 CAPSULE, LIQUID FILLED ORAL at 18:30

## 2017-11-20 RX ADMIN — RISPERIDONE 4 MG: 1 TABLET ORAL at 21:23

## 2017-11-20 RX ADMIN — FAMOTIDINE 20 MG: 20 TABLET, FILM COATED ORAL at 18:30

## 2017-11-20 RX ADMIN — BENZTROPINE MESYLATE 1 MG: 1 TABLET ORAL at 08:51

## 2017-11-20 RX ADMIN — FLECAINIDE ACETATE 100 MG: 50 TABLET ORAL at 21:22

## 2017-11-20 RX ADMIN — DILTIAZEM HYDROCHLORIDE 180 MG: 180 CAPSULE, COATED, EXTENDED RELEASE ORAL at 08:52

## 2017-11-20 RX ADMIN — DOCUSATE SODIUM 100 MG: 100 CAPSULE, LIQUID FILLED ORAL at 08:51

## 2017-11-20 RX ADMIN — LEVOTHYROXINE SODIUM 88 MCG: 88 TABLET ORAL at 06:58

## 2017-11-21 ENCOUNTER — APPOINTMENT (OUTPATIENT)
Dept: GENERAL RADIOLOGY | Facility: HOSPITAL | Age: 77
End: 2017-11-21

## 2017-11-21 VITALS
HEART RATE: 99 BPM | TEMPERATURE: 99.1 F | BODY MASS INDEX: 23.9 KG/M2 | WEIGHT: 126.6 LBS | HEIGHT: 61 IN | DIASTOLIC BLOOD PRESSURE: 69 MMHG | OXYGEN SATURATION: 92 % | RESPIRATION RATE: 16 BRPM | SYSTOLIC BLOOD PRESSURE: 132 MMHG

## 2017-11-21 PROBLEM — I97.89 POSTOPERATIVE ATRIAL FIBRILLATION: Status: ACTIVE | Noted: 2017-11-21

## 2017-11-21 PROBLEM — D64.9 POSTOPERATIVE ANEMIA: Status: ACTIVE | Noted: 2017-11-21

## 2017-11-21 PROBLEM — I48.91 POSTOPERATIVE ATRIAL FIBRILLATION (HCC): Status: ACTIVE | Noted: 2017-11-21

## 2017-11-21 LAB
HCT VFR BLD AUTO: 26.8 % (ref 34.5–44)
HGB BLD-MCNC: 8.3 G/DL (ref 11.5–15.5)

## 2017-11-21 PROCEDURE — 71010 HC CHEST PA OR AP: CPT

## 2017-11-21 PROCEDURE — 97116 GAIT TRAINING THERAPY: CPT

## 2017-11-21 PROCEDURE — 97110 THERAPEUTIC EXERCISES: CPT

## 2017-11-21 PROCEDURE — 99024 POSTOP FOLLOW-UP VISIT: CPT | Performed by: PHYSICIAN ASSISTANT

## 2017-11-21 PROCEDURE — 85014 HEMATOCRIT: CPT | Performed by: PHYSICIAN ASSISTANT

## 2017-11-21 PROCEDURE — 85018 HEMOGLOBIN: CPT | Performed by: PHYSICIAN ASSISTANT

## 2017-11-21 PROCEDURE — 94799 UNLISTED PULMONARY SVC/PX: CPT

## 2017-11-21 RX ORDER — FLECAINIDE ACETATE 100 MG/1
100 TABLET ORAL EVERY 12 HOURS SCHEDULED
Qty: 60 TABLET | Refills: 11 | Status: SHIPPED | OUTPATIENT
Start: 2017-11-21 | End: 2018-02-14 | Stop reason: SDUPTHER

## 2017-11-21 RX ORDER — FERROUS SULFATE 325(65) MG
325 TABLET ORAL
Qty: 90 TABLET | Refills: 11 | Status: SHIPPED | OUTPATIENT
Start: 2017-11-21 | End: 2020-01-20 | Stop reason: ALTCHOICE

## 2017-11-21 RX ORDER — DILTIAZEM HYDROCHLORIDE 180 MG/1
180 CAPSULE, COATED, EXTENDED RELEASE ORAL
Qty: 30 CAPSULE | Refills: 11 | Status: SHIPPED | OUTPATIENT
Start: 2017-11-22 | End: 2018-01-08 | Stop reason: DRUGHIGH

## 2017-11-21 RX ORDER — FERROUS SULFATE 325(65) MG
325 TABLET ORAL
Status: DISCONTINUED | OUTPATIENT
Start: 2017-11-21 | End: 2017-11-21 | Stop reason: HOSPADM

## 2017-11-21 RX ADMIN — ASPIRIN 325 MG: 325 TABLET, DELAYED RELEASE ORAL at 09:28

## 2017-11-21 RX ADMIN — BENZTROPINE MESYLATE 1 MG: 1 TABLET ORAL at 09:28

## 2017-11-21 RX ADMIN — FLECAINIDE ACETATE 100 MG: 50 TABLET ORAL at 09:28

## 2017-11-21 RX ADMIN — LEVOTHYROXINE SODIUM 88 MCG: 88 TABLET ORAL at 06:29

## 2017-11-21 RX ADMIN — Medication 325 MG: at 13:57

## 2017-11-21 RX ADMIN — ACETAMINOPHEN 650 MG: 325 TABLET ORAL at 06:29

## 2017-11-21 RX ADMIN — DILTIAZEM HYDROCHLORIDE 180 MG: 180 CAPSULE, COATED, EXTENDED RELEASE ORAL at 09:28

## 2017-11-21 RX ADMIN — FAMOTIDINE 20 MG: 20 TABLET, FILM COATED ORAL at 09:28

## 2017-12-15 ENCOUNTER — OUTSIDE FACILITY SERVICE (OUTPATIENT)
Dept: CARDIOLOGY | Facility: CLINIC | Age: 77
End: 2017-12-15

## 2017-12-15 PROCEDURE — 93306 TTE W/DOPPLER COMPLETE: CPT | Performed by: INTERNAL MEDICINE

## 2017-12-15 PROCEDURE — 99223 1ST HOSP IP/OBS HIGH 75: CPT | Performed by: INTERNAL MEDICINE

## 2017-12-16 ENCOUNTER — OUTSIDE FACILITY SERVICE (OUTPATIENT)
Dept: CARDIOLOGY | Facility: CLINIC | Age: 77
End: 2017-12-16

## 2017-12-16 PROCEDURE — 99232 SBSQ HOSP IP/OBS MODERATE 35: CPT | Performed by: INTERNAL MEDICINE

## 2017-12-18 ENCOUNTER — OUTSIDE FACILITY SERVICE (OUTPATIENT)
Dept: CARDIOLOGY | Facility: CLINIC | Age: 77
End: 2017-12-18

## 2017-12-18 PROCEDURE — 99232 SBSQ HOSP IP/OBS MODERATE 35: CPT | Performed by: INTERNAL MEDICINE

## 2017-12-19 ENCOUNTER — OUTSIDE FACILITY SERVICE (OUTPATIENT)
Dept: CARDIOLOGY | Facility: CLINIC | Age: 77
End: 2017-12-19

## 2017-12-19 PROCEDURE — 99232 SBSQ HOSP IP/OBS MODERATE 35: CPT | Performed by: INTERNAL MEDICINE

## 2017-12-20 ENCOUNTER — OUTSIDE FACILITY SERVICE (OUTPATIENT)
Dept: CARDIOLOGY | Facility: CLINIC | Age: 77
End: 2017-12-20

## 2017-12-20 PROCEDURE — 99232 SBSQ HOSP IP/OBS MODERATE 35: CPT | Performed by: INTERNAL MEDICINE

## 2017-12-21 ENCOUNTER — OUTSIDE FACILITY SERVICE (OUTPATIENT)
Dept: CARDIOLOGY | Facility: CLINIC | Age: 77
End: 2017-12-21

## 2017-12-21 PROCEDURE — 99232 SBSQ HOSP IP/OBS MODERATE 35: CPT | Performed by: INTERNAL MEDICINE

## 2017-12-22 ENCOUNTER — OUTSIDE FACILITY SERVICE (OUTPATIENT)
Dept: CARDIOLOGY | Facility: CLINIC | Age: 77
End: 2017-12-22

## 2017-12-22 PROCEDURE — 99232 SBSQ HOSP IP/OBS MODERATE 35: CPT | Performed by: INTERNAL MEDICINE

## 2018-01-02 ENCOUNTER — TELEPHONE (OUTPATIENT)
Dept: CARDIOLOGY | Facility: CLINIC | Age: 78
End: 2018-01-02

## 2018-01-02 NOTE — TELEPHONE ENCOUNTER
Pt's  called, was started on Entresto 24/26 mg during a recent hospital admission, is going to cost over $400.00, asking for samples till we can get it PA'd thru the insurance. Aware to  samples.  Concerned that her heart rate has been slightly elevated if pt is very active, from 102-112. BP running from 116/65 to 140/78. Normally heart rate is running 87-96 when at rest.     Discharge meds include:    1) Flecainide 100 mg BID  2) Xarelto 20 mg daily  3) Entresto 24/26 mg BID  4) Diltiazem 180 mg dialy  5) Lasix 40 mg daily  7) K+ 10 meq daily  8) ASA 81 mg daily    Also, what do we need to schedule? He said pt was to have a  Hospital follow up in 2 weeks which should be any time. Pt recently got a PPM. She is to see Dr Darling for follow up from her valve surgery 1/23/18.

## 2018-01-02 NOTE — TELEPHONE ENCOUNTER
aware to increase Cardizem to 180 mg BID.  Confirmed with her  that she does not have an appointment with EP. What do we need to schedule?

## 2018-01-08 ENCOUNTER — TELEPHONE (OUTPATIENT)
Dept: CARDIOLOGY | Facility: CLINIC | Age: 78
End: 2018-01-08

## 2018-01-08 ENCOUNTER — PRIOR AUTHORIZATION (OUTPATIENT)
Dept: CARDIOLOGY | Facility: CLINIC | Age: 78
End: 2018-01-08

## 2018-01-08 RX ORDER — DILTIAZEM HYDROCHLORIDE 180 MG/1
180 CAPSULE, COATED, EXTENDED RELEASE ORAL 2 TIMES DAILY
Qty: 180 CAPSULE | Refills: 3 | Status: SHIPPED | OUTPATIENT
Start: 2018-01-08 | End: 2018-02-13

## 2018-01-08 NOTE — TELEPHONE ENCOUNTER
Received call from [facility] pharmacy stating that patient in pharmacy requesting script for Diltiazem 180mg bid. Pharmacist states we do not have script on file for Diltiazem 180mg bid. Script sent after reviewing telephone encounter from 1/2/18.

## 2018-01-11 NOTE — TELEPHONE ENCOUNTER
Entresto has been approved from 01/09/2018 through 01/09/2020.  Patients  aware of approval and pharmacy informed.

## 2018-01-23 ENCOUNTER — OFFICE VISIT (OUTPATIENT)
Dept: CARDIAC SURGERY | Facility: CLINIC | Age: 78
End: 2018-01-23

## 2018-01-23 ENCOUNTER — TELEPHONE (OUTPATIENT)
Dept: CARDIOLOGY | Facility: CLINIC | Age: 78
End: 2018-01-23

## 2018-01-23 VITALS
HEART RATE: 89 BPM | HEIGHT: 61 IN | DIASTOLIC BLOOD PRESSURE: 58 MMHG | WEIGHT: 124 LBS | SYSTOLIC BLOOD PRESSURE: 112 MMHG | BODY MASS INDEX: 23.41 KG/M2

## 2018-01-23 DIAGNOSIS — Z95.2 S/P AVR: Primary | ICD-10-CM

## 2018-01-23 PROCEDURE — 99024 POSTOP FOLLOW-UP VISIT: CPT | Performed by: THORACIC SURGERY (CARDIOTHORACIC VASCULAR SURGERY)

## 2018-01-23 RX ORDER — POTASSIUM CHLORIDE 750 MG/1
10 TABLET, FILM COATED, EXTENDED RELEASE ORAL DAILY
COMMUNITY
Start: 2017-12-26 | End: 2020-01-20 | Stop reason: SDUPTHER

## 2018-01-23 RX ORDER — FUROSEMIDE 20 MG/1
20 TABLET ORAL DAILY
COMMUNITY
Start: 2017-12-23 | End: 2018-02-13 | Stop reason: DRUGHIGH

## 2018-01-23 RX ORDER — RIVAROXABAN 20 MG/1
20 TABLET, FILM COATED ORAL DAILY
COMMUNITY
Start: 2017-12-21 | End: 2018-01-23 | Stop reason: SDUPTHER

## 2018-01-26 ENCOUNTER — TELEPHONE (OUTPATIENT)
Dept: CARDIOLOGY | Facility: CLINIC | Age: 78
End: 2018-01-26

## 2018-01-26 NOTE — TELEPHONE ENCOUNTER
[facility] pharmacy in [place] called asking if it is ok for patient to be taking z-pack prescribed by her dentist with her Flecainide and Entresto?      Phone [phone]

## 2018-01-26 NOTE — TELEPHONE ENCOUNTER
Tracy, pharmacist at [facility] aware of risk.    I tried to call dentist office, DEANDRE WebbJrrmp-[phone].  His office is not open.    I called patient's .  He states patient has a cavity and may need tooth pulled.  I advised him regarding the small risk of V. Tach.  He is not going to fill the Zpak and I will contact the dentist on Monday.

## 2018-01-29 NOTE — TELEPHONE ENCOUNTER
I spoke with Nishi at Dr. Webb office.  I explained to her that the Zpak puts patient at small risk of V. Tach per Dr. Waller's recommendations.    I explained to her that since patient allergic to PCN, cephalexin could be an option (2 g 30-60 minutes before procedure) per TAYO Burnett.

## 2018-02-13 ENCOUNTER — OFFICE VISIT (OUTPATIENT)
Dept: CARDIOLOGY | Facility: CLINIC | Age: 78
End: 2018-02-13

## 2018-02-13 VITALS
DIASTOLIC BLOOD PRESSURE: 60 MMHG | SYSTOLIC BLOOD PRESSURE: 118 MMHG | HEART RATE: 90 BPM | WEIGHT: 125 LBS | HEIGHT: 61 IN | BODY MASS INDEX: 23.6 KG/M2

## 2018-02-13 DIAGNOSIS — I50.30 CONGESTIVE HEART FAILURE WITH LV DIASTOLIC DYSFUNCTION, NYHA CLASS 2 (HCC): Primary | ICD-10-CM

## 2018-02-13 DIAGNOSIS — Z95.810 BIVENTRICULAR IMPLANTABLE CARDIOVERTER-DEFIBRILLATOR (ICD) IN SITU: ICD-10-CM

## 2018-02-13 DIAGNOSIS — Z78.9 PACED RHYTHM ON ELECTROCARDIOGRAM (ECG): ICD-10-CM

## 2018-02-13 DIAGNOSIS — I97.89 POSTOPERATIVE ATRIAL FIBRILLATION (HCC): ICD-10-CM

## 2018-02-13 DIAGNOSIS — I10 ESSENTIAL HYPERTENSION: ICD-10-CM

## 2018-02-13 DIAGNOSIS — Z95.2 S/P AVR: ICD-10-CM

## 2018-02-13 DIAGNOSIS — Z79.01 CURRENT USE OF LONG TERM ANTICOAGULATION: ICD-10-CM

## 2018-02-13 DIAGNOSIS — I35.0 NONRHEUMATIC AORTIC VALVE STENOSIS: ICD-10-CM

## 2018-02-13 DIAGNOSIS — I48.91 POSTOPERATIVE ATRIAL FIBRILLATION (HCC): ICD-10-CM

## 2018-02-13 DIAGNOSIS — Z79.899 LONG TERM CURRENT USE OF ANTIARRHYTHMIC DRUG: ICD-10-CM

## 2018-02-13 PROCEDURE — 93000 ELECTROCARDIOGRAM COMPLETE: CPT | Performed by: NURSE PRACTITIONER

## 2018-02-13 PROCEDURE — 99214 OFFICE O/P EST MOD 30 MIN: CPT | Performed by: NURSE PRACTITIONER

## 2018-02-13 RX ORDER — FUROSEMIDE 40 MG/1
40 TABLET ORAL DAILY
COMMUNITY
End: 2018-05-02 | Stop reason: DRUGHIGH

## 2018-02-13 RX ORDER — LEVOTHYROXINE SODIUM 0.12 MG/1
88 TABLET ORAL DAILY
COMMUNITY
End: 2018-07-23 | Stop reason: DRUGHIGH

## 2018-02-13 RX ORDER — DILTIAZEM HYDROCHLORIDE 180 MG/1
180 CAPSULE, COATED, EXTENDED RELEASE ORAL DAILY
Start: 2018-02-13 | End: 2018-04-23 | Stop reason: ALTCHOICE

## 2018-02-13 RX ORDER — DOCUSATE SODIUM 100 MG/1
100 CAPSULE, LIQUID FILLED ORAL 2 TIMES DAILY
COMMUNITY
End: 2018-04-23 | Stop reason: ALTCHOICE

## 2018-02-13 RX ORDER — ACETAMINOPHEN 325 MG/1
650 TABLET ORAL EVERY 6 HOURS PRN
COMMUNITY

## 2018-02-13 NOTE — PROGRESS NOTES
Chief Complaint   Patient presents with   • Hospital follow up     Patient in hospital 12/2017 had [employer] BIV PPM per Dr Dickey. She reports that she is unable to afford Entresto copay, she is aware med PA was approved but still unable to pay copay.  reports B/P had been low, was seen by PCP with decrease in Diltiazem ER to once daily and Entresto to once daily, he reports PCP changed Entresto back to bid today.   • Shortness of Breath     She states has some shortness of breath but doesn't think it is anything new.   • Med Refill     Needs refills on Xarelto and Flecainide-30 day.       Subjective       Debbie Rodrigez is a 77 y.o. female with  aortic stenosis, HTN, hyperlipidemia, and SVT being managed medically. Echocardiogram done in February of 2017 showed severe aortic stenosis with BRIE of 1.0. She was referred to Dr. Darling who recommended right and left heart cath. Cath done 10/20/17 showed normal coronaries and significant AS and AI with valve replacement recommended. ON 11/10/17 she underwent aortic valve replacement by Dr. Darling. In December, she developed symptoms of weakness and diagnosed with congestive heart failure. 12/15/17 echocardiogram showed EF 35-40%, diastolic dysfunction. Also found to be severely hypothyroid. She developed tachy ainsley syndrome. Dr. Dickey was consulted for Biventricular ICD to aid medication therapy for tachyarrhythmia and help with nonischemic cardiomyopathy with diminished ejection fraction. ON 12/19/17 a [employer] BiV ICD was placed. Medication management included addition of Entresto. She hashad followed up with Dr. Darling on 01/23/18 regarding post op TAVR.  Today she comes to the office for a hospital follow up visit. She denies shortness of breath with rest but does develop with normal daily activities. No chest pain noted. Swelling of her lower legs and feet resolves over night and during day progressively increases, however, improved with  current medications. No signs of bleeding reported. No dizziness or lightheadedness reported.     HPI         Cardiac History:    Past Surgical History:   Procedure Laterality Date   • AORTIC VALVE REPAIR/REPLACEMENT N/A 11/10/2017    Procedure: AORTIC VALVE REPAIR/REPLACEMENT WITH MARYSE;  Surgeon: Dmitry Darling MD;  Location: [facility];  Service:    • CARDIAC CATHETERIZATION  09/11/2002    Cath- EF 30% Normal Coronaries   • CARDIAC CATHETERIZATION  10/20/2017    Normal Coronaries. Sig AI. Unable to cross AV   • CARDIOVASCULAR STRESS TEST  05/22/2003    P. Myoview- Normal. Pt had CP   • CARDIOVASCULAR STRESS TEST  03/12/2013    L. Myoview- Pt had CP. Negative   • CONVERTED (HISTORICAL) CARDIOVASCULAR STUDIES  01/22/2014    MUGA- EF 45%   • CONVERTED (HISTORICAL) HOLTER  10/10/2002    Holter- AVG HR 70 BPM   • ECHO - CONVERTED  09/15/2003    Echo- ([facility]) EF 55%. LBBB. RVSP- 42 mmHg   • ECHO - CONVERTED  03/12/2013    Echo- EF 60%. BRIE- 1.6 mmHg. RVSP- 42 mmHg   • ECHO - CONVERTED  10/15/2013    Echo-EF 35%, mod. MR, & AI. BRIE- 1.4 Cm2. RVSP- 40 mmHg   • ECHO - CONVERTED  06/09/2015    Echo- EF 45-50%, mod MR, Mod AS, BRIE 1.1 Cm2, Mod AR, RVSP- 35 mmHg   • ECHO - CONVERTED  02/21/2017    EF 55-60%, BRIE 1.0   • ECHO - CONVERTED  12/15/2017    @[facility]. EF 35-40%. LBBB. Mild- Mod MR   • PACEMAKER IMPLANTATION  12/19/2017    [employer] BIV PPM by Dr. Dickey   • MARYSE  09/06/2013    MARYSE- < 35%, Moderate AS. AI and MR. No Thrombus       Current Outpatient Prescriptions   Medication Sig Dispense Refill   • acetaminophen (TYLENOL) 325 MG tablet Take 650 mg by mouth Every 6 (Six) Hours As Needed for Mild Pain .     • aspirin 81 MG EC tablet Take 81 mg by mouth daily.     • calcium carbonate (OS-PABLO) 600 MG tablet Take 600 mg by mouth Daily. CALCIUM 600 MG AND VITAMIN D3 800 IU     • cetirizine (zyrTEC) 10 MG tablet Take 10 mg by mouth Daily.     • docusate sodium (COLACE) 100 MG capsule Take 100 mg by mouth 2 (Two) Times a Day.     •  ferrous sulfate 325 (65 FE) MG tablet Take 1 tablet by mouth 3 (Three) Times a Day With Meals. 90 tablet 11   • flecainide (TAMBOCOR) 100 MG tablet Take 1 tablet by mouth Every 12 (Twelve) Hours. 60 tablet 11   • furosemide (LASIX) 40 MG tablet Take 40 mg by mouth Daily.     • ipratropium (ATROVENT) 0.03 % nasal spray 4 sprays into each nostril 2 (Two) Times a Day As Needed for Rhinitis.     • levothyroxine (SYNTHROID, LEVOTHROID) 125 MCG tablet Take 125 mcg by mouth Daily.     • Magnesium 250 MG tablet Take 250 mg by mouth Daily.     • Multiple Vitamin (MULTI VITAMIN DAILY PO) Take 1 tablet by mouth Daily.     • Omega 3 1000 MG capsule Take 1 tablet by mouth 2 (Two) Times a Day. FISH OIL 1000 MG  MG OMEGA -3     • potassium chloride (K-DUR) 10 MEQ CR tablet Take 10 mEq by mouth Daily.     • pravastatin (PRAVACHOL) 40 MG tablet Take 40 mg by mouth daily.     • risperiDONE (risperDAL) 3 MG tablet Take 3 mg by mouth Every Night.     • rivaroxaban (XARELTO) 20 MG tablet Take 1 tablet by mouth Daily. 30 tablet 11   • sacubitril-valsartan (ENTRESTO) 24-26 MG tablet Take 1 tablet by mouth Every 12 (Twelve) Hours. 56 tablet 0   • vitamin C (ASCORBIC ACID) 500 MG tablet Take 500 mg by mouth Daily.     • diltiaZEM CD (CARDIZEM CD) 180 MG 24 hr capsule Take 1 capsule by mouth Daily.       No current facility-administered medications for this visit.        Penicillins and Sulfa antibiotics    Past Medical History:   Diagnosis Date   • Bipolar affective disorder    • CHF (congestive heart failure)    • Diabetes mellitus     DX'D TYPE II NIDDM APPROX 3-4 YEARS AGO, NO MEDS, CHECKS BLOOD SUGAR 2-3 TIMES PER WEEK, AVERAGE    • CLAYTON (generalized anxiety disorder)    • H/O dilation and curettage    • H/O eye surgery     Eye surgery- bilateral   • H/O:     • H/O: hysterectomy     s/p appendectomy   • Hallucinations    • History of AAA (abdominal aortic aneurysm) repair     (pt doesn't recall- on PCP note)   •  "Hyperlipidemia    • Hypertension    • Hypothyroidism    • Overactive bladder    • Stroke     TIA 3 YEARS AGO, MEMORY LOSS    • SVT (supraventricular tachycardia)    • Wears dentures     PARTIALS ON TOP AND BOTTOM    • Wears glasses        Social History     Social History   • Marital status:      Spouse name: N/A   • Number of children: 1   • Years of education: N/A     Occupational History   • RETIRED       Social History Main Topics   • Smoking status: Never Smoker   • Smokeless tobacco: Never Used   • Alcohol use No   • Drug use: No   • Sexual activity: Defer     Other Topics Concern   • Not on file     Social History Narrative       Family History   Problem Relation Age of Onset   • Heart disease Mother    • Diabetes Mother    • Cancer Father      Bone       Review of Systems   Constitution: Positive for malaise/fatigue (improved ). Negative for decreased appetite, diaphoresis and fever.   HENT: Negative for congestion and nosebleeds.    Eyes: Negative for blurred vision and visual disturbance.   Respiratory: Positive for shortness of breath. Negative for cough and sputum production.    Endocrine: Negative for polydipsia, polyphagia and polyuria.   Hematologic/Lymphatic: Negative for bleeding problem. Does not bruise/bleed easily.   Skin: Negative for itching and rash.   Musculoskeletal: Negative for falls, muscle cramps and myalgias.   Gastrointestinal: Negative for change in bowel habit, dysphagia, melena and nausea.   Genitourinary: Positive for nocturia. Negative for dysuria and hematuria.   Neurological: Negative for dizziness, headaches and light-headedness.   Psychiatric/Behavioral: Positive for depression. Negative for memory loss. The patient is nervous/anxious. The patient does not have insomnia.    Allergic/Immunologic: Negative for hives.      Diabetes- No  Thyroid-abnormal    Objective     /60 (BP Location: Right arm)  Pulse 90  Ht 154.9 cm (60.98\")  Wt 56.7 kg (125 lb) "  BMI 23.63 kg/m2    Physical Exam   Constitutional: She is oriented to person, place, and time. She appears well-nourished.   HENT:   Head: Normocephalic.   Eyes: Conjunctivae are normal. Pupils are equal, round, and reactive to light.   Neck: Normal range of motion. Neck supple. No JVD present. Carotid bruit is not present.   Cardiovascular: Normal rate, regular rhythm, S1 normal, S2 normal and normal pulses.    Murmur heard.   Midsystolic murmur of grade 2/6 is also present at the upper right sternal border.  Pulmonary/Chest: Effort normal and breath sounds normal. She has no rales.   Abdominal: Soft. Bowel sounds are normal. She exhibits no distension. There is no tenderness.   Musculoskeletal: Normal range of motion. She exhibits edema. She exhibits no tenderness.   Neurological: She is alert and oriented to person, place, and time.   Skin: Skin is warm.   Psychiatric: She has a normal mood and affect. Her speech is normal and behavior is normal. Her affect is not inappropriate.        ECG 12 Lead  Date/Time: 2018 5:42 PM  Performed by: SIENNA BLANCA  Authorized by: SIENNA BLANCA   Comparison: compared with previous ECG from 2017  Comparison to previous ECG: Atrial fib  Rhythm: sinus rhythm and paced  BPM: 90  Pacin% capture  Clinical impression: abnormal ECG                Assessment/Plan      Debbie was seen today for hospital follow up, shortness of breath and med refill.    Diagnoses and all orders for this visit:    Congestive heart failure with LV diastolic dysfunction, NYHA class 2    Nonrheumatic aortic valve stenosis    S/P AVR    Essential hypertension    Biventricular implantable cardioverter-defibrillator (ICD) in situ    Postoperative atrial fibrillation    Long term current use of antiarrhythmic drug    Current use of long term anticoagulation    Paced rhythm on electrocardiogram (ECG)    Other orders  -     diltiaZEM CD (CARDIZEM CD) 180 MG 24 hr capsule; Take 1 capsule by  mouth Daily.  -     rivaroxaban (XARELTO) 20 MG tablet; Take 1 tablet by mouth Daily.  -     flecainide (TAMBOCOR) 100 MG tablet; Take 1 tablet by mouth Every 12 (Twelve) Hours.  -     ECG 12 Lead        Her blood pressure is stable today. EKG done for ICD and medication management shows sinus with ventricular pacing. A device interrogation scheduled for March. CHADVASc score is 3. She denies signs of active bleed. Advised to continue Xarelto. Agree with decreased dose of Cardizem. Continue Entresto 24/26 bid. A Request was made for cost assistance with Entresto pharmaceutical. She will be seeing Dr. Darling in near future continued post op care TAVR. I did not order repeat echo today.   Her weight is stable, BMI normal and appetite good. Continue good cardiac diet. She understands to avoid lifting and low impact cardio activity encouraged.   A 3 month follow up visit scheduled and will consider echo if not done sooner. She will follow with PCP for management of labs, including TSH. Please forward a copy of most recent lab report.           Electronically signed by TAYO Black,  February 14, 2018 5:43 PM

## 2018-02-14 RX ORDER — FLECAINIDE ACETATE 100 MG/1
100 TABLET ORAL EVERY 12 HOURS SCHEDULED
Qty: 60 TABLET | Refills: 11 | Status: SHIPPED | OUTPATIENT
Start: 2018-02-14 | End: 2018-05-25

## 2018-02-20 NOTE — TELEPHONE ENCOUNTER
Entresto approved. Script sent to [facility] pharmacy. Will inform patient to call for the Patient Assistance Network.

## 2018-03-14 ENCOUNTER — OFFICE VISIT (OUTPATIENT)
Dept: CARDIOLOGY | Facility: CLINIC | Age: 78
End: 2018-03-14

## 2018-03-14 DIAGNOSIS — I51.9 SYSTOLIC DYSFUNCTION: ICD-10-CM

## 2018-03-14 DIAGNOSIS — I49.5 SSS (SICK SINUS SYNDROME) (HCC): Primary | ICD-10-CM

## 2018-03-14 PROCEDURE — 93288 INTERROG EVL PM/LDLS PM IP: CPT | Performed by: INTERNAL MEDICINE

## 2018-03-19 ENCOUNTER — TELEPHONE (OUTPATIENT)
Dept: CARDIOLOGY | Facility: CLINIC | Age: 78
End: 2018-03-19

## 2018-03-19 NOTE — TELEPHONE ENCOUNTER
Patients  Verlon called concerning Patient assistance program on Xarelto. Farhana phoned patient assistance program, was told need copy of medication with cost from pharmacy for this year sent to program, Bipin patients  made aware. Requesting samples of Xarelto 20mg, samples ready for -7 tablets.

## 2018-03-26 ENCOUNTER — PRIOR AUTHORIZATION (OUTPATIENT)
Dept: CARDIOLOGY | Facility: CLINIC | Age: 78
End: 2018-03-26

## 2018-03-26 NOTE — TELEPHONE ENCOUNTER
Application for the Xarelto Patient Assistance Program has been denied. Patient does not meet criteria

## 2018-03-28 ENCOUNTER — TELEPHONE (OUTPATIENT)
Dept: CARDIOLOGY | Facility: CLINIC | Age: 78
End: 2018-03-28

## 2018-03-30 ENCOUNTER — TELEPHONE (OUTPATIENT)
Dept: CARDIOLOGY | Facility: CLINIC | Age: 78
End: 2018-03-30

## 2018-04-10 RX ORDER — SACUBITRIL AND VALSARTAN 24; 26 MG/1; MG/1
TABLET, FILM COATED ORAL
Qty: 56 TABLET | Refills: 0 | Status: SHIPPED | OUTPATIENT
Start: 2018-04-10 | End: 2018-06-06 | Stop reason: SINTOL

## 2018-04-23 ENCOUNTER — OFFICE VISIT (OUTPATIENT)
Dept: CARDIOLOGY | Facility: CLINIC | Age: 78
End: 2018-04-23

## 2018-04-23 VITALS
BODY MASS INDEX: 23.79 KG/M2 | SYSTOLIC BLOOD PRESSURE: 110 MMHG | DIASTOLIC BLOOD PRESSURE: 60 MMHG | HEIGHT: 61 IN | WEIGHT: 126 LBS | HEART RATE: 94 BPM

## 2018-04-23 DIAGNOSIS — Z95.810 BIVENTRICULAR ICD (IMPLANTABLE CARDIOVERTER-DEFIBRILLATOR) IN PLACE: ICD-10-CM

## 2018-04-23 DIAGNOSIS — I35.0 NONRHEUMATIC AORTIC VALVE STENOSIS: Primary | ICD-10-CM

## 2018-04-23 DIAGNOSIS — I49.5 SSS (SICK SINUS SYNDROME) (HCC): ICD-10-CM

## 2018-04-23 DIAGNOSIS — I97.89 POSTOPERATIVE ATRIAL FIBRILLATION (HCC): ICD-10-CM

## 2018-04-23 DIAGNOSIS — Z51.81 MEDICATION MONITORING ENCOUNTER: ICD-10-CM

## 2018-04-23 DIAGNOSIS — I10 ESSENTIAL HYPERTENSION: ICD-10-CM

## 2018-04-23 DIAGNOSIS — I47.1 SVT (SUPRAVENTRICULAR TACHYCARDIA) (HCC): ICD-10-CM

## 2018-04-23 DIAGNOSIS — Z95.2 S/P AVR: ICD-10-CM

## 2018-04-23 DIAGNOSIS — R53.83 OTHER FATIGUE: ICD-10-CM

## 2018-04-23 DIAGNOSIS — I51.9 LV DYSFUNCTION: ICD-10-CM

## 2018-04-23 DIAGNOSIS — I48.91 POSTOPERATIVE ATRIAL FIBRILLATION (HCC): ICD-10-CM

## 2018-04-23 DIAGNOSIS — I48.0 PAF (PAROXYSMAL ATRIAL FIBRILLATION) (HCC): ICD-10-CM

## 2018-04-23 PROCEDURE — 99214 OFFICE O/P EST MOD 30 MIN: CPT | Performed by: NURSE PRACTITIONER

## 2018-04-23 RX ORDER — DIGOXIN 125 MCG
125 TABLET ORAL DAILY
Qty: 30 TABLET | Refills: 11 | Status: SHIPPED | OUTPATIENT
Start: 2018-04-23 | End: 2018-05-25

## 2018-04-23 NOTE — PROGRESS NOTES
"Chief Complaint   Patient presents with   • Follow-up     Cardiac management. Patient recently in ER due to , notes in chart. PCP started Metoprolol tartrate 25mg bid,  reports has stopped due to low B/P and she had syncope episode with a fall.  states PCP had stopped Cartia XT prior to going to ER.  She reports that she has had diarrhea for the 18 days, PCP started Lomotil that she has completed and continues with diarrhea.  has questions concerning medications?  Having fluctuation in B/P.   • Pacemaker Check     Last [facility] BIV PPM checked 3/14/18.   • Rapid Heart Rate      states \"HR  at home\". She states has had one episode of dizziness while in bed when she turned over in bed       Subjective       Debbie Rodrigez is a 77 y.o. female with aortic stenosis, HTN, hyperlipidemia, and SVT being managed medically. Echocardiogram done in February of 2017 showed severe aortic stenosis with BRIE of 1.0. She was referred to Dr. Darling who recommended right and left heart cath. Cath done 10/20/17 showed normal coronaries and significant AS and AI with valve replacement recommended. On 11/10/17 she underwent aortic valve replacement by Dr. Darling.  She did have post op AFIB.  In December, she developed symptoms of weakness, admitted to [facility],  diagnosed with congestive heart failure. 12/15/17 echocardiogram showed EF 35-40%, diastolic dysfunction. Also found to be severely hypothyroid. She developed tachy ainsley syndrome. Dr. Dickey was consulted for Biventricular ICD to aid medication therapy for tachyarrhythmia and help with nonischemic cardiomyopathy with diminished ejection fraction. On 12/19/17 a [employer] BiV ICD was placed. Medication management included addition of Entresto. She has had followed up with Dr. Darling on 01/23/18 regarding post op AVR.  Pacemaker check on 3/14/18 showed 100% RV/LV paced with 5 mode switches which appear to be PAF.  She is anticoagulated with " Xarelto.      She came in today for follow up visit and to be evaluated for tachycardia.  She was in the ER on 4/1/18 with tachycardia.  Her  monitors her vitals closely with HR usually in the 90's.  -140 systolic.  On this day, HR was noted to be elevated. She was mostly asymptomatic but went to the ER noted to be in sinus tach vs SVT with rate 184.  According to her , diltiazem was stopped just prior to this episode, unsure why.  She was given IV Cardizem which slowed her rate to 70s and she was sent home.  Labs in ER stable with magnesium 2.1, H/H normal, no TSH available.  She followed up with Dr. Bhatti the following day, and Lopressor was added which caused hypotension and syncope and  stopped.  She also has diarrhea ongoing for 3 weeks not responding to Lomotil followed by Dr. Bhatti.  Today, she feels well.  Denies any chest pain, shortness of breath is present but minimal, no orthopnea, no palpitations.      HPI         Cardiac History:    Past Surgical History:   Procedure Laterality Date   • AORTIC VALVE REPAIR/REPLACEMENT N/A 11/10/2017    Procedure: AORTIC VALVE REPAIR/REPLACEMENT WITH MARYSE;  Surgeon: Dmitry Darling MD;  Location: [facility];  Service:    • CARDIAC CATHETERIZATION  09/11/2002    Cath- EF 30% Normal Coronaries   • CARDIAC CATHETERIZATION  10/20/2017    Normal Coronaries. Sig AI. Unable to cross AV   • CARDIOVASCULAR STRESS TEST  05/22/2003    P. Myoview- Normal. Pt had CP   • CARDIOVASCULAR STRESS TEST  03/12/2013    L. Myoview- Pt had CP. Negative   • CONVERTED (HISTORICAL) CARDIOVASCULAR STUDIES  01/22/2014    MUGA- EF 45%   • CONVERTED (HISTORICAL) HOLTER  10/10/2002    Holter- AVG HR 70 BPM   • ECHO - CONVERTED  09/15/2003    Echo- ([facility]) EF 55%. LBBB. RVSP- 42 mmHg   • ECHO - CONVERTED  03/12/2013    Echo- EF 60%. BRIE- 1.6 mmHg. RVSP- 42 mmHg   • ECHO - CONVERTED  10/15/2013    Echo-EF 35%, mod. MR, & AI. BRIE- 1.4 Cm2. RVSP- 40 mmHg   • ECHO - CONVERTED   06/09/2015    Echo- EF 45-50%, mod MR, Mod AS, BRIE 1.1 Cm2, Mod AR, RVSP- 35 mmHg   • ECHO - CONVERTED  02/21/2017    EF 55-60%, BRIE 1.0   • ECHO - CONVERTED  12/15/2017    @[facility]. EF 35-40%. LBBB. Mild- Mod MR   • PACEMAKER IMPLANTATION  12/19/2017    [place] BIV PPM by Dr. Dickey   • MARYSE  09/06/2013    MARYSE- < 35%, Moderate AS. AI and MR. No Thrombus       Current Outpatient Prescriptions   Medication Sig Dispense Refill   • acetaminophen (TYLENOL) 325 MG tablet Take 650 mg by mouth Every 6 (Six) Hours As Needed for Mild Pain .     • aspirin 81 MG EC tablet Take 81 mg by mouth daily.     • calcium carbonate (OS-PABLO) 600 MG tablet Take 600 mg by mouth Daily. CALCIUM 600 MG AND VITAMIN D3 800 IU     • cetirizine (zyrTEC) 10 MG tablet Take 10 mg by mouth Daily.     • ENTRESTO 24-26 MG tablet TAKE ONE TABLET BY MOUTH EVERY 12 HOURS 56 tablet 0   • ferrous sulfate 325 (65 FE) MG tablet Take 1 tablet by mouth 3 (Three) Times a Day With Meals. 90 tablet 11   • flecainide (TAMBOCOR) 100 MG tablet Take 1 tablet by mouth Every 12 (Twelve) Hours. 60 tablet 11   • furosemide (LASIX) 40 MG tablet Take 40 mg by mouth Daily.     • ipratropium (ATROVENT) 0.03 % nasal spray 4 sprays into each nostril 2 (Two) Times a Day As Needed for Rhinitis.     • levothyroxine (SYNTHROID, LEVOTHROID) 125 MCG tablet Take 125 mcg by mouth Daily.     • magnesium oxide (MAGOX) 400 (241.3 Mg) MG tablet tablet Take 400 mg by mouth Daily.     • Multiple Vitamin (MULTI VITAMIN DAILY PO) Take 1 tablet by mouth Daily.     • Omega 3 1000 MG capsule Take 1 tablet by mouth 2 (Two) Times a Day. FISH OIL 1000 MG  MG OMEGA -3     • potassium chloride (K-DUR) 10 MEQ CR tablet Take 10 mEq by mouth Daily.     • pravastatin (PRAVACHOL) 40 MG tablet Take 40 mg by mouth daily.     • risperiDONE (risperDAL) 3 MG tablet Take 3 mg by mouth Every Night.     • rivaroxaban (XARELTO) 20 MG tablet Take 1 tablet by mouth Daily. 28 tablet 0   • vitamin C (ASCORBIC ACID)  500 MG tablet Take 500 mg by mouth Daily.     • digoxin (LANOXIN) 125 MCG tablet Take 1 tablet by mouth Daily. 30 tablet 11     No current facility-administered medications for this visit.        Penicillins and Sulfa antibiotics    Past Medical History:   Diagnosis Date   • Bipolar affective disorder    • CHF (congestive heart failure)    • Diabetes mellitus     DX'D TYPE II NIDDM APPROX 3-4 YEARS AGO, NO MEDS, CHECKS BLOOD SUGAR 2-3 TIMES PER WEEK, AVERAGE    • CLAYTON (generalized anxiety disorder)    • H/O dilation and curettage    • H/O eye surgery     Eye surgery- bilateral   • H/O:     • H/O: hysterectomy     s/p appendectomy   • Hallucinations    • History of AAA (abdominal aortic aneurysm) repair     (pt doesn't recall- on PCP note)   • Hyperlipidemia    • Hypertension    • Hypothyroidism    • Overactive bladder    • Stroke     TIA 3 YEARS AGO, MEMORY LOSS    • SVT (supraventricular tachycardia)    • Wears dentures     PARTIALS ON TOP AND BOTTOM    • Wears glasses        Social History     Social History   • Marital status:      Spouse name: N/A   • Number of children: 1   • Years of education: N/A     Occupational History   • RETIRED       Social History Main Topics   • Smoking status: Never Smoker   • Smokeless tobacco: Never Used   • Alcohol use No   • Drug use: No   • Sexual activity: Defer     Other Topics Concern   • Not on file     Social History Narrative   • No narrative on file       Family History   Problem Relation Age of Onset   • Heart disease Mother    • Diabetes Mother    • Cancer Father      Bone       Review of Systems   Constitution: Negative for decreased appetite, weakness, malaise/fatigue and weight loss.   HENT: Negative.    Eyes: Negative.    Cardiovascular: Positive for dyspnea on exertion and leg swelling (very  mild, wears stockings ). Negative for chest pain.   Respiratory: Positive for shortness of breath (very mild with exertion ). Negative for  "cough.    Endocrine: Negative.    Hematologic/Lymphatic: Negative for bleeding problem. Does not bruise/bleed easily.   Skin: Negative.    Musculoskeletal: Negative for joint pain and myalgias.   Gastrointestinal: Positive for diarrhea. Negative for abdominal pain and melena.   Genitourinary: Negative for dysuria and hematuria.   Neurological: Negative for dizziness.   Psychiatric/Behavioral: Negative for altered mental status. The patient is nervous/anxious.       Diabetes- No  Thyroid- historically abnormal, no recent labs for review    Objective     /60 (BP Location: Left arm)   Pulse 94   Ht 154.9 cm (60.98\")   Wt 57.2 kg (126 lb)   BMI 23.82 kg/m²     Physical Exam   Constitutional: She is oriented to person, place, and time. She appears well-developed and well-nourished.   HENT:   Head: Normocephalic.   Eyes: Pupils are equal, round, and reactive to light.   Neck: Normal range of motion.   Cardiovascular: Normal rate, regular rhythm and intact distal pulses.    Murmur heard.   Systolic murmur is present with a grade of 2/6  at the upper right sternal border  Pulmonary/Chest: Effort normal and breath sounds normal. No respiratory distress. She has no wheezes. She has no rales.   Abdominal: Soft. Bowel sounds are normal.   Musculoskeletal: Normal range of motion. She exhibits edema (trace ).   Neurological: She is alert and oriented to person, place, and time.   Skin: Skin is warm and dry.   Psychiatric: She has a normal mood and affect.   Nursing note and vitals reviewed.       ECG 12 Lead  Date/Time: 2018 11:35 AM  Performed by: TAMERA FLOWER  Authorized by: TAMERA FLOWER   Rhythm: paced  Rate: normal  BPM: 94  Pacin% capture  Clinical impression: abnormal ECG  Comments: Ventricular pacing                 Assessment/Plan    Blood pressure stable.  Heart rate remains slightly elevated at 94.  EKG showed ventricular pacing.  Discussed with Dr. Waller.  Add digoxin 0.125 mg daily.  She was " given a lab order to check dig level in 6-8 weeks after starting.  Continue flecainide and Xarelto.  Echocardiogram will be ordered to reassess LV function and aortic valve.  She is advised to follow up with you regarding labs and TSH monitoring.  She is advised to follow up with you regarding the diarrhea.  Encouraged to maintain hydration especially with diarrhea.  Further recommendations once echo is done and response to digoxin is noted.  We will see her back in three months for follow up or sooner if needed.      Debbie was seen today for follow-up, pacemaker check and rapid heart rate.    Diagnoses and all orders for this visit:    Nonrheumatic aortic valve stenosis  -     Adult Transthoracic Echo Complete W/ Cont if Necessary Per Protocol; Future    S/P AVR  -     Adult Transthoracic Echo Complete W/ Cont if Necessary Per Protocol; Future    Postoperative atrial fibrillation  -     Adult Transthoracic Echo Complete W/ Cont if Necessary Per Protocol; Future    SVT (supraventricular tachycardia)  -     Adult Transthoracic Echo Complete W/ Cont if Necessary Per Protocol; Future    Essential hypertension  -     Adult Transthoracic Echo Complete W/ Cont if Necessary Per Protocol; Future    SSS (sick sinus syndrome)  -     Adult Transthoracic Echo Complete W/ Cont if Necessary Per Protocol; Future    LV dysfunction  -     Adult Transthoracic Echo Complete W/ Cont if Necessary Per Protocol; Future    Biventricular ICD (implantable cardioverter-defibrillator) in place  -     Adult Transthoracic Echo Complete W/ Cont if Necessary Per Protocol; Future    PAF (paroxysmal atrial fibrillation)  -     digoxin (LANOXIN) 125 MCG tablet; Take 1 tablet by mouth Daily.    Medication monitoring encounter  -     Digoxin Level; Future    Other fatigue  -     Digoxin Level; Future                    Electronically signed by TAYO Deshpande,  April 24, 2018 11:08 AM

## 2018-04-24 PROCEDURE — 93000 ELECTROCARDIOGRAM COMPLETE: CPT | Performed by: NURSE PRACTITIONER

## 2018-05-01 ENCOUNTER — OUTSIDE FACILITY SERVICE (OUTPATIENT)
Dept: CARDIOLOGY | Facility: CLINIC | Age: 78
End: 2018-05-01

## 2018-05-01 ENCOUNTER — HOSPITAL ENCOUNTER (OUTPATIENT)
Dept: CARDIOLOGY | Facility: HOSPITAL | Age: 78
Discharge: HOME | End: 2018-05-01
Admitting: NURSE PRACTITIONER

## 2018-05-01 ENCOUNTER — TELEPHONE (OUTPATIENT)
Dept: CARDIOLOGY | Facility: CLINIC | Age: 78
End: 2018-05-01

## 2018-05-01 DIAGNOSIS — I47.1 SVT (SUPRAVENTRICULAR TACHYCARDIA) (HCC): Primary | ICD-10-CM

## 2018-05-01 DIAGNOSIS — I49.5 SSS (SICK SINUS SYNDROME) (HCC): ICD-10-CM

## 2018-05-01 DIAGNOSIS — I47.1 SVT (SUPRAVENTRICULAR TACHYCARDIA) (HCC): ICD-10-CM

## 2018-05-01 DIAGNOSIS — I97.89 POSTOPERATIVE ATRIAL FIBRILLATION (HCC): ICD-10-CM

## 2018-05-01 DIAGNOSIS — Z95.810 BIVENTRICULAR ICD (IMPLANTABLE CARDIOVERTER-DEFIBRILLATOR) IN PLACE: ICD-10-CM

## 2018-05-01 DIAGNOSIS — Z95.2 S/P AVR: ICD-10-CM

## 2018-05-01 DIAGNOSIS — I35.0 NONRHEUMATIC AORTIC VALVE STENOSIS: ICD-10-CM

## 2018-05-01 DIAGNOSIS — I51.9 LV DYSFUNCTION: ICD-10-CM

## 2018-05-01 DIAGNOSIS — I48.91 POSTOPERATIVE ATRIAL FIBRILLATION (HCC): ICD-10-CM

## 2018-05-01 DIAGNOSIS — I10 ESSENTIAL HYPERTENSION: ICD-10-CM

## 2018-05-01 LAB
MAXIMAL PREDICTED HEART RATE: 144 BPM
STRESS TARGET HR: 122 BPM

## 2018-05-01 PROCEDURE — 93306 TTE W/DOPPLER COMPLETE: CPT

## 2018-05-01 PROCEDURE — 93306 TTE W/DOPPLER COMPLETE: CPT | Performed by: INTERNAL MEDICINE

## 2018-05-01 NOTE — TELEPHONE ENCOUNTER
Patient developed SVT while getting echo.  Per Dr. Waller, order holter monitor.  Three days, please.

## 2018-05-02 RX ORDER — FUROSEMIDE 20 MG/1
20 TABLET ORAL DAILY
Qty: 30 TABLET | Refills: 6 | Status: ON HOLD | OUTPATIENT
Start: 2018-05-02 | End: 2018-09-07

## 2018-05-07 ENCOUNTER — CLINICAL SUPPORT (OUTPATIENT)
Dept: CARDIOLOGY | Facility: CLINIC | Age: 78
End: 2018-05-07

## 2018-05-07 ENCOUNTER — TELEPHONE (OUTPATIENT)
Dept: CARDIOLOGY | Facility: CLINIC | Age: 78
End: 2018-05-07

## 2018-05-07 DIAGNOSIS — I47.1 SVT (SUPRAVENTRICULAR TACHYCARDIA) (HCC): ICD-10-CM

## 2018-05-07 PROCEDURE — 0296T PR EXT ECG > 48HR TO 21 DAY RCRD W/CONECT INTL RCRD: CPT | Performed by: INTERNAL MEDICINE

## 2018-05-07 NOTE — TELEPHONE ENCOUNTER
Patient was in for E-Patch placement.  is concerned about the Entresto as patient is having to empty her bladder every 30 minutes. She is taking Lasix 20 daily and drinks 60 oz of fluids a day.    One of the side effects of Entresto is kidney problems and this concerned him.

## 2018-05-08 ENCOUNTER — OUTSIDE FACILITY SERVICE (OUTPATIENT)
Dept: CARDIOLOGY | Facility: CLINIC | Age: 78
End: 2018-05-08

## 2018-05-08 DIAGNOSIS — I47.1 SVT (SUPRAVENTRICULAR TACHYCARDIA) (HCC): Primary | ICD-10-CM

## 2018-05-08 PROCEDURE — 99215 OFFICE O/P EST HI 40 MIN: CPT | Performed by: INTERNAL MEDICINE

## 2018-05-09 ENCOUNTER — OUTSIDE FACILITY SERVICE (OUTPATIENT)
Dept: CARDIOLOGY | Facility: CLINIC | Age: 78
End: 2018-05-09

## 2018-05-09 PROCEDURE — 99214 OFFICE O/P EST MOD 30 MIN: CPT | Performed by: INTERNAL MEDICINE

## 2018-05-14 ENCOUNTER — TELEPHONE (OUTPATIENT)
Dept: CARDIOLOGY | Facility: CLINIC | Age: 78
End: 2018-05-14

## 2018-05-14 ENCOUNTER — OUTSIDE FACILITY SERVICE (OUTPATIENT)
Dept: CARDIOLOGY | Facility: CLINIC | Age: 78
End: 2018-05-14

## 2018-05-14 PROCEDURE — 0298T PR EXT ECG > 48HR TO 21 DAY REVIEW AND INTERPRETATN: CPT | Performed by: INTERNAL MEDICINE

## 2018-05-22 ENCOUNTER — OFFICE VISIT (OUTPATIENT)
Dept: CARDIAC SURGERY | Facility: CLINIC | Age: 78
End: 2018-05-22

## 2018-05-22 VITALS
BODY MASS INDEX: 22.84 KG/M2 | OXYGEN SATURATION: 97 % | HEART RATE: 83 BPM | SYSTOLIC BLOOD PRESSURE: 130 MMHG | DIASTOLIC BLOOD PRESSURE: 70 MMHG | HEIGHT: 61 IN | WEIGHT: 121 LBS

## 2018-05-22 DIAGNOSIS — I35.0 NONRHEUMATIC AORTIC VALVE STENOSIS: Primary | ICD-10-CM

## 2018-05-22 PROCEDURE — 99213 OFFICE O/P EST LOW 20 MIN: CPT | Performed by: PHYSICIAN ASSISTANT

## 2018-05-22 RX ORDER — DILTIAZEM HYDROCHLORIDE 180 MG/1
180 CAPSULE, COATED, EXTENDED RELEASE ORAL DAILY
COMMUNITY
End: 2018-07-23 | Stop reason: SDUPTHER

## 2018-05-22 RX ORDER — DOCUSATE SODIUM 100 MG/1
100 CAPSULE, LIQUID FILLED ORAL 2 TIMES DAILY PRN
COMMUNITY

## 2018-05-22 NOTE — PROGRESS NOTES
05/22/2018  Patient Information  Debbie Rodrigez                                                                                          [address]  [place] [address]   1940  'PCP/Referring Physician'  Reagan Bhatti MD  [phone]  No ref. provider found    Chief Complaint   Patient presents with   • Follow-up     3 MO FU for Aortic Stenosis-SOB       History of Present Illness:  Patient is a 77-year-old  female with history of hypertension, bipolar affective disorder, congestive heart failure, hypothyroid and aortic stenosis status post tissue aortic valve replacement performed 11/10/17 who presents to office today for 3 month follow-up for pulmonary congestion.  Patient continues to take Entresto, and states that it is working well, but it is very expensive.  Patient was recently admitted to the hospital on 5/9/18 through 5/11/18 secondary to tachycardia.  Patient is also having diarrhea, and has been having this issue since April.  Patient requires a colonoscopy, but must first receive cardiac clearance from her cardiologist.  Mrs. Rodrigez is scheduled to follow up with Dr. Chavira on 5/25/18.  Patient also continues to take Xarelto for atrial fibrillation and wants to discontinue that, but will require an EKG this Friday, prior to discontinuation.    Patient Active Problem List   Diagnosis   • AS (aortic stenosis)   • SVT (supraventricular tachycardia)   • Hypothyroid   • HTN (hypertension)   • Nonrheumatic aortic valve stenosis   • Bipolar affective disorder   • S/P AVR   • Postoperative anemia   • Postoperative atrial fibrillation   • Systolic dysfunction   • SSS (sick sinus syndrome)     Past Medical History:   Diagnosis Date   • Aortic valve stenosis    • Bipolar affective disorder    • CHF (congestive heart failure)    • Diabetes mellitus     DX'D TYPE II NIDDM APPROX 3-4 YEARS AGO, NO MEDS, CHECKS BLOOD SUGAR 2-3 TIMES PER WEEK, AVERAGE    • CLAYTON (generalized anxiety disorder)     • H/O dilation and curettage    • H/O eye surgery     Eye surgery- bilateral   • H/O:     • H/O: hysterectomy     s/p appendectomy   • Hallucinations    • History of AAA (abdominal aortic aneurysm) repair     (pt doesn't recall- on PCP note)   • Hyperlipidemia    • Hypertension    • Hypothyroidism    • Overactive bladder    • Stroke     TIA 3 YEARS AGO, MEMORY LOSS    • SVT (supraventricular tachycardia)    • Tachycardia    • Wears dentures     PARTIALS ON TOP AND BOTTOM    • Wears glasses      Past Surgical History:   Procedure Laterality Date   • AORTIC VALVE REPAIR/REPLACEMENT N/A 11/10/2017    Procedure: AORTIC VALVE REPAIR/REPLACEMENT WITH MARYSE;  Surgeon: Dmitry Darling MD;  Location: [facility];  Service:    • CARDIAC CATHETERIZATION  2002    Cath- EF 30% Normal Coronaries   • CARDIAC CATHETERIZATION  10/20/2017    Normal Coronaries. Sig AI. Unable to cross AV   • CARDIOVASCULAR STRESS TEST  2003    P. Myoview- Normal. Pt had CP   • CARDIOVASCULAR STRESS TEST  2013    L. Myoview- Pt had CP. Negative   • CONVERTED (HISTORICAL) CARDIOVASCULAR STUDIES  2014    MUGA- EF 45%   • CONVERTED (HISTORICAL) HOLTER  10/10/2002    Holter- AVG HR 70 BPM   • ECHO - CONVERTED  09/15/2003    Echo- ([facility]) EF 55%. LBBB. RVSP- 42 mmHg   • ECHO - CONVERTED  2013    Echo- EF 60%. BRIE- 1.6 mmHg. RVSP- 42 mmHg   • ECHO - CONVERTED  10/15/2013    Echo-EF 35%, mod. MR, & AI. BRIE- 1.4 Cm2. RVSP- 40 mmHg   • ECHO - CONVERTED  2015    Echo- EF 45-50%, mod MR, Mod AS, BRIE 1.1 Cm2, Mod AR, RVSP- 35 mmHg   • ECHO - CONVERTED  2017    EF 55-60%, BRIE 1.0   • ECHO - CONVERTED  12/15/2017    @[facility]. EF 35-40%. LBBB. Mild- Mod MR   • ECHO - CONVERTED  2018    EF 45-50%. Mod MR. RVSP- 35 mmHg   • PACEMAKER IMPLANTATION  2017    [place] BIV PPM by Dr. Dickey   • MARYSE  2013    MARYSE- < 35%, Moderate AS. AI and MR. No Thrombus       Current Outpatient Prescriptions:   •  aspirin 81 MG  EC tablet, Take 81 mg by mouth daily., Disp: , Rfl:   •  calcium carbonate (OS-PABLO) 600 MG tablet, Take 600 mg by mouth Daily. CALCIUM 600 MG AND VITAMIN D3 800 IU, Disp: , Rfl:   •  cetirizine (zyrTEC) 10 MG tablet, Take 10 mg by mouth Daily., Disp: , Rfl:   •  diltiaZEM CD (CARDIZEM CD) 180 MG 24 hr capsule, Take 180 mg by mouth Daily., Disp: , Rfl:   •  docusate sodium (COLACE) 100 MG capsule, Take 100 mg by mouth 2 (Two) Times a Day., Disp: , Rfl:   •  ENTRESTO 24-26 MG tablet, TAKE ONE TABLET BY MOUTH EVERY 12 HOURS, Disp: 56 tablet, Rfl: 0  •  ferrous sulfate 325 (65 FE) MG tablet, Take 1 tablet by mouth 3 (Three) Times a Day With Meals., Disp: 90 tablet, Rfl: 11  •  furosemide (LASIX) 20 MG tablet, Take 1 tablet by mouth Daily., Disp: 30 tablet, Rfl: 6  •  ipratropium (ATROVENT) 0.03 % nasal spray, 4 sprays into each nostril 2 (Two) Times a Day As Needed for Rhinitis., Disp: , Rfl:   •  levothyroxine (SYNTHROID, LEVOTHROID) 125 MCG tablet, Take 125 mcg by mouth Daily., Disp: , Rfl:   •  magnesium oxide (MAGOX) 400 (241.3 Mg) MG tablet tablet, Take 400 mg by mouth Daily., Disp: , Rfl:   •  metoprolol tartrate (LOPRESSOR) 25 MG tablet, Take 25 mg by mouth As Needed., Disp: , Rfl:   •  Multiple Vitamins-Minerals (MULTIVITAMIN ADULTS PO), Take  by mouth., Disp: , Rfl:   •  Omega 3 1000 MG capsule, Take 1 tablet by mouth 2 (Two) Times a Day. FISH OIL 1000 MG  MG OMEGA -3, Disp: , Rfl:   •  potassium chloride (K-DUR) 10 MEQ CR tablet, Take 10 mEq by mouth Daily., Disp: , Rfl:   •  risperiDONE (risperDAL) 3 MG tablet, Take 3 mg by mouth Every Night., Disp: , Rfl:   •  rivaroxaban (XARELTO) 20 MG tablet, Take 1 tablet by mouth Daily., Disp: 28 tablet, Rfl: 0  •  vitamin C (ASCORBIC ACID) 500 MG tablet, Take 500 mg by mouth Daily., Disp: , Rfl:   •  acetaminophen (TYLENOL) 325 MG tablet, Take 650 mg by mouth Every 6 (Six) Hours As Needed for Mild Pain ., Disp: , Rfl:   •  digoxin (LANOXIN) 125 MCG tablet, Take  1 tablet by mouth Daily., Disp: 30 tablet, Rfl: 11  •  flecainide (TAMBOCOR) 100 MG tablet, Take 1 tablet by mouth Every 12 (Twelve) Hours., Disp: 60 tablet, Rfl: 11  •  Multiple Vitamin (MULTI VITAMIN DAILY PO), Take 1 tablet by mouth Daily., Disp: , Rfl:   •  pravastatin (PRAVACHOL) 40 MG tablet, Take 40 mg by mouth daily., Disp: , Rfl:   Allergies   Allergen Reactions   • Penicillins Rash   • Sulfa Antibiotics Rash     Social History     Social History   • Marital status:      Spouse name: N/A   • Number of children: 1   • Years of education: N/A     Occupational History   • RETIRED       Social History Main Topics   • Smoking status: Never Smoker   • Smokeless tobacco: Never Used   • Alcohol use No   • Drug use: No   • Sexual activity: Defer     Other Topics Concern   • Not on file     Social History Narrative    Lives in [place], [place] with .     Family History   Problem Relation Age of Onset   • Heart disease Mother    • Diabetes Mother    • Cancer Father         Bone     Review of Systems   Constitution: Positive for night sweats. Negative for chills, fever, malaise/fatigue and weight loss.   HENT: Negative for hearing loss, odynophagia and sore throat.    Cardiovascular: Positive for dyspnea on exertion. Negative for chest pain, leg swelling, orthopnea and palpitations.   Respiratory: Positive for cough and shortness of breath. Negative for hemoptysis.    Endocrine: Negative for cold intolerance, heat intolerance, polydipsia, polyphagia and polyuria.   Hematologic/Lymphatic: Bruises/bleeds easily.   Skin: Negative for itching and rash.   Musculoskeletal: Negative for joint pain, joint swelling and myalgias.   Gastrointestinal: Positive for diarrhea. Negative for abdominal pain, constipation, hematemesis, hematochezia, melena, nausea and vomiting.   Genitourinary: Negative for dysuria, frequency and hematuria.   Neurological: Negative for focal weakness, headaches, numbness and  "seizures.   Psychiatric/Behavioral: Negative for suicidal ideas.   All other systems reviewed and are negative.    Vitals:    05/22/18 0952   BP: 130/70   BP Location: Left arm   Patient Position: Sitting   Pulse: 83   SpO2: 97%   Weight: 54.9 kg (121 lb)   Height: 154.9 cm (61\")      Physical Exam:  Gen - NAD, pleasant, cooperative  CV - Regular rate and rhythm, no murmur gallop or rub  Pulm - Lungs clear to auscultation without wheeze or rhonchi   GI - Soft, normoactive bowel sounds, non-tender  Ext - Without edema   Incision - Sternum stable, incision well healed, no evidence of incisional dehiscence or cellulitis    Assessment/Plan:  Patient is a 77-year-old  female with history of hypertension, bipolar affective disorder, congestive heart failure, hypothyroid and aortic stenosis status post tissue aortic valve replacement performed 11/10/17 who presents to office today for 3 month follow-up for pulmonary congestion.  The patient was recently admitted to the hospital from 5/9/18 to 5/11/18 for tachycardia.  The patient would like to discontinue her Xarelto, but will require an EKG and Dr. Chavira's office this Friday on 5/25/18, before can be discontinued.  Patient will also require clearance for colonoscopy with Dr. Chavira, as well - in order for evaluation of persistent diarrhea.  The patient continues to take Entresto, with good result, however she complains it is very expensive.  From a postoperative/surgical standpoint the patient is doing well, and may follow-up with our office as needed.  She should call with any questions or concerns, should any arise during the interval.        Patient Active Problem List   Diagnosis   • AS (aortic stenosis)   • SVT (supraventricular tachycardia)   • Hypothyroid   • HTN (hypertension)   • Nonrheumatic aortic valve stenosis   • Bipolar affective disorder   • S/P AVR   • Postoperative anemia   • Postoperative atrial fibrillation   • Systolic dysfunction   • " SSS (sick sinus syndrome)     Signed by:

## 2018-05-24 NOTE — PROGRESS NOTES
Electrophysiology Consult     Debbie Rodrigez  1940  [phone]      05/25/18    DATE OF ADMISSION: (Not on file)  [facility] CARDIOLOGY    Reagan Bhatti MD  [address] [facility] / [facility] [address]    Chief Complaint   Patient presents with   • Rapid Heart Rate     Problem List:  1.  NICM:   A.  Left heart catheterization 9/11/2002: EF 30%, normal coronary arteries   B.  Myoview stress test 5/22/2003: Normal stress test, data deficit   C.  Echocardiogram 9/15/2003: EF 55%, RVSP 42 mmHg, data deficit   D. Echocardiogram 3/12/2013 EF 60%, RVSP 42 mmHg   E. Myoview stress test 3/12/2013 negative for ischemia   F. Echocardiogram 10/15/2013: EF 35%, moderate MR, moderate AI, RVSP 40 mmHg   G. Transesophageal echocardiogram 9/6/2013 EF less than 35%, moderate AF, AI, and MR   H. MUGA scan 1/22/2014: EF 62%   I.  Echocardiogram 6/9/2015: EF 45-50%, moderate MR, moderate AF, moderate AI, RVSP 35 mmHg   J. Echocardiogram 2/21/2017: EF 55-60%   K. Echocardiogram 8/30/17: EF 40-45%, moderate to severe AS, moderate AI, mild MR, RVSP 38 mm Hg   L. Left heart catheterization 10/2017: Normal coronary arteries, significant AS and AI with AVR recommended   M. Echocardiogram 12/15/2017: EF 35-40%, left bundle branch block, mild to moderate MR   N. S/p BSC Bi-V PM with Dr. Dickey 12/19/17   O. Echocardiogram 5/1/18: EF 45-50%, moderate MR, RVSP 35 mmHg, mild LVH  2. SVT   A.   3. Atrial fibrillation:   A. Developed postoperatively after tissue aortic valve replacement in November, initiated on flecainide and Xarelto   B. 7 day Holter monitor 5/7/2018:16.3% A. fib burden, average heart rate 90 bpm, minimum 51 bpm, maximum 202 bpm   4. Valvular heart disease:   A. Aortic stenosis s/p tissue AVR 11/10/17 with Dr. Darling, EF during intra-operative MARYSE showing EF 60%  5. Tachybrady syndrome:   A. S/p BSC BI-V PM with Dr. Dickey 12/19/17  6. Hypertension  7. Hyperlipidemia  8. Hypothyroidism  9. Diabetes  mellitus, type II  10. Bipolar disorder  11. TIA  12. Generalized anxiety disorder  13. Surgical history:   A. D&C   B.  section   C. Hysterectomy   D. Appendectomy   E. Eye surgery       History of Present Illness:   Patient is a 77 year old  female with the above-noted past medical history who presents to our office today for a referral from Dr. Waller for SVT.  As noted above, she has a long-standing history of nonischemic cardiomyopathy and had a normal left heart catheterization in .  She has a history of severe aortic stenosis and underwent tissue aortic valve replacement with Dr. Darling in November.  She did develop postoperative atrial fibrillation and subsequently has been on flecainide and anticoagulated with Xarelto, tolerating well with no bleeding issues or TIA/CVA symptoms.  She also had evidence of tachy-ainsley syndrome with a left bundle branch block for which she underwent Bi-V PPM implant with Dr. Dickey.  It was felt her tachycardia would be able to be controlled better once PM in place.  Most recent echocardiogram earlier this month showing an EF of 45-50%.  She was recently hospitalized earlier this month for tachycardia episodes.  She states she was given IV medicine to slow it down, does not remember the medication.  She states she did have one syncopal episode a few months ago prior to presenting to the emergency room.  Last ER visit was 2 weeks ago.  Some elevated heart rates noted on her home log.  BP's well controlled.  Last thyroid levels were high so her synthroid was decreased.  She has yet to have this rechecked.    Allergies   Allergen Reactions   • Penicillins Rash   • Sulfa Antibiotics Rash        Cannot display prior to admission medications because the patient has not been admitted in this contact.            Current Outpatient Prescriptions:   •  acetaminophen (TYLENOL) 325 MG tablet, Take 650 mg by mouth Every 6 (Six) Hours As Needed for Mild Pain .,  Disp: , Rfl:   •  aspirin 81 MG EC tablet, Take 81 mg by mouth daily., Disp: , Rfl:   •  calcium carbonate (OS-PABLO) 600 MG tablet, Take 600 mg by mouth Daily. CALCIUM 600 MG AND VITAMIN D3 800 IU, Disp: , Rfl:   •  cetirizine (zyrTEC) 10 MG tablet, Take 10 mg by mouth Daily., Disp: , Rfl:   •  diltiaZEM CD (CARDIZEM CD) 180 MG 24 hr capsule, Take 180 mg by mouth Daily., Disp: , Rfl:   •  docusate sodium (COLACE) 100 MG capsule, Take 100 mg by mouth 2 (Two) Times a Day., Disp: , Rfl:   •  ENTRESTO 24-26 MG tablet, TAKE ONE TABLET BY MOUTH EVERY 12 HOURS, Disp: 56 tablet, Rfl: 0  •  ferrous sulfate 325 (65 FE) MG tablet, Take 1 tablet by mouth 3 (Three) Times a Day With Meals., Disp: 90 tablet, Rfl: 11  •  furosemide (LASIX) 20 MG tablet, Take 1 tablet by mouth Daily., Disp: 30 tablet, Rfl: 6  •  ipratropium (ATROVENT) 0.03 % nasal spray, 4 sprays into each nostril 2 (Two) Times a Day As Needed for Rhinitis., Disp: , Rfl:   •  levothyroxine (SYNTHROID, LEVOTHROID) 125 MCG tablet, Take 88 mcg by mouth Daily., Disp: , Rfl:   •  magnesium oxide (MAGOX) 400 (241.3 Mg) MG tablet tablet, Take 400 mg by mouth Daily., Disp: , Rfl:   •  metoprolol tartrate (LOPRESSOR) 25 MG tablet, Take 12.5 mg by mouth As Needed., Disp: , Rfl:   •  Multiple Vitamin (MULTI VITAMIN DAILY PO), Take 1 tablet by mouth Daily., Disp: , Rfl:   •  Multiple Vitamins-Minerals (MULTIVITAMIN ADULTS PO), Take  by mouth., Disp: , Rfl:   •  Omega 3 1000 MG capsule, Take 1 tablet by mouth 2 (Two) Times a Day. FISH OIL 1000 MG  MG OMEGA -3, Disp: , Rfl:   •  potassium chloride (K-DUR) 10 MEQ CR tablet, Take 10 mEq by mouth Daily., Disp: , Rfl:   •  pravastatin (PRAVACHOL) 40 MG tablet, Take 40 mg by mouth daily., Disp: , Rfl:   •  risperiDONE (risperDAL) 3 MG tablet, Take 3 mg by mouth Every Night., Disp: , Rfl:   •  rivaroxaban (XARELTO) 20 MG tablet, Take 1 tablet by mouth Daily., Disp: 28 tablet, Rfl: 0  •  vitamin C (ASCORBIC ACID) 500 MG tablet,  "Take 500 mg by mouth Daily., Disp: , Rfl:     Social History     Social History   • Marital status:    • Number of children: 1     Occupational History   • RETIRED       Social History Main Topics   • Smoking status: Never Smoker   • Smokeless tobacco: Never Used   • Alcohol use No   • Drug use: No   • Sexual activity: Defer     Other Topics Concern   • Not on file     Social History Narrative    Lives in [place], [place] with .       Family History   Problem Relation Age of Onset   • Heart disease Mother    • Diabetes Mother    • Cancer Father         Bone       REVIEW OF SYSTEMS:   CONST:  + fatigue, no weight loss, fever, chills   HEENT:  No visual loss, blurred vision, double vision, yellow sclerae.                   + hearing loss, no congestion, sore throat.   SKIN:      No rashes, urticaria, ulcers, sores.     RESP:     No shortness of breath, hemoptysis, cough, sputum.   GI:           No anorexia, nausea, vomiting, + diarrhea. No abdominal pain, melena.   :         No burning on urination, hematuria or increased frequency.  ENDO:    No diaphoresis, cold or heat intolerance. No polyuria or polydipsia.   NEURO:  No headache, dizziness, syncope, paralysis, ataxia, or parasthesias.                  No change in bowel or bladder control. No history of CVA/TIA  MUSC:    No muscle, back pain, joint pain or stiffness.   HEME:    No anemia, bleeding, bruising. No history of DVT/PE.  PSYCH:  No history of depression, anxiety    Vitals:    05/25/18 1357   BP: 100/50   BP Location: Right arm   Patient Position: Sitting   Pulse: 94   Weight: 54.4 kg (120 lb)   Height: 154.9 cm (61\")                 Physical Exam:  GEN: Well nourished, Well-developed, no acute distress  HEENT: Normocephalic, Atraumatic, PERRLA, moist mucous membranes  NECK: supple, NO JVD, no thyromegaly, no lymphadenopathy  CARD: S1S2  RRR no murmur, gallop, rub  LUNGS: Clear to auscultataion, normal respiratory effort  ABDOMEN: " Soft, nontender, normal bowel sounds  EXTREMITIES: No gross deformities,  No clubbing, cyanosis, or edema  SKIN: Warm, dry  NEURO: No focal deficits  PSYCHIATRIC: Normal affect and mood        ECG 12 Lead  Date/Time: 5/25/2018 2:48 PM  Performed by: YOSEF CHAVIRA  Authorized by: YOSEF CHAVIRA   Comparison: compared with previous ECG from 5/8/2018  Similar to previous ECG  Rhythm: paced  BPM: 90          Device interrogation showing Bi-V PPM with normal function, <1% RA paced, 89% Bi-V paced, 9 years battery life, 10% SVT/AF, most recent episode 5/22/18      ICD-10-CM ICD-9-CM   1. SVT (supraventricular tachycardia) I47.1 427.89   2. Postoperative atrial fibrillation I97.89 997.1    I48.91 427.31   3. Systolic dysfunction I51.9 429.9   4. SSS (sick sinus syndrome) I49.5 427.81       Assessment and Plan:   1. SVT:  - Symptomatic episodes of SVT requiring ED admission: will pursue EPS with RFA  2. Paroxysmal atrial fibrillation:  - Noted post-operatively after valve surgery in November  - Stop xarelto to see if this is the offending agent of diarrhea, if diarrhea improves off Xarelto, no need for colonscopy, can switch to either to Eliquis or warfarin due to financial issues  3. NICM:  - Last EF 45-50% earlier this month, now on Entresto and tolerating well but wishing to come off due to financial issues, samples given today  4. SSS:  - S/p Bi-V PPM implant 12/2017, device with normal function today    Scribed for Yosef Chavira MD by Ceci Mcleod, APRN. 5/25/2018  2:46 PM     I, Yosef Chavira MD, personally performed the services described in this documentation as scribed by the above named individual in my presence, and it is both accurate and complete.  5/25/2018  3:50 PM

## 2018-05-25 ENCOUNTER — CONSULT (OUTPATIENT)
Dept: CARDIOLOGY | Facility: CLINIC | Age: 78
End: 2018-05-25

## 2018-05-25 VITALS
HEIGHT: 61 IN | WEIGHT: 120 LBS | HEART RATE: 94 BPM | SYSTOLIC BLOOD PRESSURE: 100 MMHG | BODY MASS INDEX: 22.66 KG/M2 | DIASTOLIC BLOOD PRESSURE: 50 MMHG

## 2018-05-25 DIAGNOSIS — I49.5 SSS (SICK SINUS SYNDROME) (HCC): ICD-10-CM

## 2018-05-25 DIAGNOSIS — I97.89 POSTOPERATIVE ATRIAL FIBRILLATION (HCC): ICD-10-CM

## 2018-05-25 DIAGNOSIS — I47.1 SVT (SUPRAVENTRICULAR TACHYCARDIA) (HCC): Primary | ICD-10-CM

## 2018-05-25 DIAGNOSIS — I51.9 SYSTOLIC DYSFUNCTION: ICD-10-CM

## 2018-05-25 DIAGNOSIS — I48.91 POSTOPERATIVE ATRIAL FIBRILLATION (HCC): ICD-10-CM

## 2018-05-25 PROCEDURE — 99204 OFFICE O/P NEW MOD 45 MIN: CPT | Performed by: INTERNAL MEDICINE

## 2018-05-25 PROCEDURE — 93281 PM DEVICE PROGR EVAL MULTI: CPT | Performed by: INTERNAL MEDICINE

## 2018-05-29 ENCOUNTER — TELEPHONE (OUTPATIENT)
Dept: CARDIOLOGY | Facility: CLINIC | Age: 78
End: 2018-05-29

## 2018-05-29 NOTE — TELEPHONE ENCOUNTER
Patient came by office and states that she had been having some diarrhea so Dr. Chavira said that she could hold her Xarelto to see if her symptoms improved. States she held for 5 days but still has diarrhea.  reports that he read that diarrhea is a potential side effect of Entresto. Asking if she can hold entresto to see if the diarrhea subsides.     [phone]

## 2018-06-06 ENCOUNTER — TELEPHONE (OUTPATIENT)
Dept: CARDIOLOGY | Facility: CLINIC | Age: 78
End: 2018-06-06

## 2018-06-06 RX ORDER — LISINOPRIL 5 MG/1
5 TABLET ORAL DAILY
Qty: 30 TABLET | Refills: 5 | Status: SHIPPED | OUTPATIENT
Start: 2018-06-06 | End: 2018-07-23 | Stop reason: SDUPTHER

## 2018-06-06 NOTE — TELEPHONE ENCOUNTER
Patient came by office and states that she stopped her Entresto and Diarrhea resolved. Asking if she can remain off, and if so does she need to take anything in place? Thank you!

## 2018-06-06 NOTE — TELEPHONE ENCOUNTER
Spoke with . He is aware of recommendation to start Lisinopril. Script sent to [facility] in [place].

## 2018-06-07 DIAGNOSIS — I47.1 SVT (SUPRAVENTRICULAR TACHYCARDIA) (HCC): Primary | ICD-10-CM

## 2018-06-19 ENCOUNTER — TELEPHONE (OUTPATIENT)
Dept: CARDIOLOGY | Facility: CLINIC | Age: 78
End: 2018-06-19

## 2018-06-19 NOTE — TELEPHONE ENCOUNTER
Not sure what her recent labs show, but on 5/8/18 while inpatient, CrCl 38 which indicates she needs renal dosing of 15 mg dialy. This may have improved. I think what Dr. Bhatti recommended is acceptable.

## 2018-06-19 NOTE — TELEPHONE ENCOUNTER
Patients  presented to office stating that Dr. Reagan Bhatti told her to take 15mg of Xarelto one day and to take 20mg of Xarelto the next and continually rotate. Patient wants to know if this is safe.    Phone number: [phone]

## 2018-07-20 NOTE — PROGRESS NOTES
Chief Complaint   Patient presents with   • Follow-up     For cardiac management. Patient is on aspirin. Reports that last lab work was done on 07/10/18 per PCP, in chart under media.    • Med Refill     Needs refills on cardiac medications. 30 day supplys to [facility] in [place].    • Pacemaker Check     Last [facility] BIV-PPM check on 05/25/18 per Dr. Chavira's office.        Cardiac Complaints  fatigue      Subjective   Debbie Rodrigez is a 77 y.o. female with aortic stenosis, HTN, hyperlipidemia, and SVT being managed medically. Echocardiogram done in February of 2017 showed severe aortic stenosis with BRIE of 1.0. She was referred to Dr. Darling who recommended right and left heart cath. Cath done 10/20/17 showed normal coronaries and significant AS and AI with valve replacement recommended. On 11/10/17 she underwent aortic valve replacement by Dr. Darling.  She did have post op AFIB.  In December, she developed symptoms of weakness, admitted to [facility],  diagnosed with congestive heart failure. 12/15/17 echocardiogram showed EF 35-40%, diastolic dysfunction. At that time she was also diagnosed with hypothyroidism. She then developed tachy ainsley syndrome and Dr. Dickey was consulted for Biventricular ICD to aid medication therapy for tachyarrhythmia and help with nonischemic cardiomyopathy with diminished ejection fraction. On 12/19/17 a [employer] BiV ICD was placed with entresto added. She has had followed up with Dr. Darling on 01/23/18 regarding post op AVR.     At last visit, she reported being in ER in April for tachycardia with SVT in the 180's. Heart rate was still elevated at 94 and digoxin was added and tambocor and xarelto were continued.  Echo was advised to reassess LV function and BRIE.  EF was improved at 45-50%, but during the echo, patient developed SVT and holter was advised. She then returned to the hospital in May 2018 for palpitations with rate of 175, determined to be SVT, thyroid  medication was held and she converted to NSR on her own. Holter she wore after hospitalization showed patient in SVT 16.3% of the time and EP was consulted.  At consult with Dr. Chavira, patient reported more issus with diarrhea and xarelto was held.  It was decided to proceed with EPS with RFA.  Diarrhea persisted and patient continued her xarelto and then later stopped entresto with lisinopril advised in place of.  She returns today for follow up and denies any new cardiac concerns.  Patient does still admit to fatigue some days but states this has been going on for sometime.  Chest pain, shortness of breath, and palpitations are denied.  Labs are done with PCP, most recent from July show TSH 2.090 and T4 7.5, no other labs were done, not sure if she was on synthroid at this time.  Refills of cardiac meds done with PCP.  Most recent pacer check with Dr. Chavira in May of 2017 showed normal function with 89% Bi-V pacing and 9 years of battery life with 10% SVT/AFIB noted.      Cardiac History  Past Surgical History:   Procedure Laterality Date   • AORTIC VALVE REPAIR/REPLACEMENT N/A 11/10/2017    Procedure: AORTIC VALVE REPAIR/REPLACEMENT WITH MARYSE;  Surgeon: Dmitry Darling MD;  Location: [facility];  Service:    • CARDIAC CATHETERIZATION  09/11/2002    Cath- EF 30% Normal Coronaries   • CARDIAC CATHETERIZATION  10/20/2017    Normal Coronaries. Sig AI. Unable to cross AV   • CARDIOVASCULAR STRESS TEST  05/22/2003    P. Myoview- Normal. Pt had CP   • CARDIOVASCULAR STRESS TEST  03/12/2013    L. Myoview- Pt had CP. Negative   • CONVERTED (HISTORICAL) CARDIOVASCULAR STUDIES  01/22/2014    MUGA- EF 45%   • CONVERTED (HISTORICAL) HOLTER  10/10/2002    Holter- AVG HR 70 BPM   • ECHO - CONVERTED  09/15/2003    Echo- ([facility]) EF 55%. LBBB. RVSP- 42 mmHg   • ECHO - CONVERTED  03/12/2013    Echo- EF 60%. BRIE- 1.6 mmHg. RVSP- 42 mmHg   • ECHO - CONVERTED  10/15/2013    Echo-EF 35%, mod. MR, & AI. BRIE- 1.4 Cm2. RVSP- 40 mmHg    • ECHO - CONVERTED  06/09/2015    Echo- EF 45-50%, mod MR, Mod AS, BRIE 1.1 Cm2, Mod AR, RVSP- 35 mmHg   • ECHO - CONVERTED  02/21/2017    EF 55-60%, BRIE 1.0   • ECHO - CONVERTED  12/15/2017    @[facility]. EF 35-40%. LBBB. Mild- Mod MR   • ECHO - CONVERTED  05/01/2018    EF 45-50%. Mod MR. RVSP- 35 mmHg   • PACEMAKER IMPLANTATION  12/19/2017    [place] BIV PPM by Dr. Dickey   • MARYSE  09/06/2013    MARYSE- < 35%, Moderate AS. AI and MR. No Thrombus       Current Outpatient Prescriptions   Medication Sig Dispense Refill   • acetaminophen (TYLENOL) 325 MG tablet Take 650 mg by mouth Every 6 (Six) Hours As Needed for Mild Pain .     • aspirin 81 MG EC tablet Take 81 mg by mouth daily.     • calcium carbonate (OS-PABLO) 600 MG tablet Take 600 mg by mouth Daily. CALCIUM 600 MG AND VITAMIN D3 800 IU     • cetirizine (zyrTEC) 10 MG tablet Take 10 mg by mouth Daily.     • diltiaZEM CD (CARDIZEM CD) 180 MG 24 hr capsule Take 1 capsule by mouth Daily. 30 capsule 11   • docusate sodium (COLACE) 100 MG capsule Take 100 mg by mouth 2 (Two) Times a Day.     • ferrous sulfate 325 (65 FE) MG tablet Take 1 tablet by mouth 3 (Three) Times a Day With Meals. 90 tablet 11   • furosemide (LASIX) 20 MG tablet Take 1 tablet by mouth Daily. 30 tablet 6   • ipratropium (ATROVENT) 0.03 % nasal spray 4 sprays into each nostril 2 (Two) Times a Day As Needed for Rhinitis.     • levothyroxine (SYNTHROID, LEVOTHROID) 88 MCG tablet Take 88 mcg by mouth Daily.     • lisinopril (PRINIVIL,ZESTRIL) 5 MG tablet Take 1 tablet by mouth Daily. 30 tablet 11   • magnesium oxide (MAGOX) 400 (241.3 Mg) MG tablet tablet Take 400 mg by mouth 2 (Two) Times a Day.     • metoprolol tartrate (LOPRESSOR) 25 MG tablet Take 0.5 tablets by mouth As Needed (for elevated heart rate above 100). 30 tablet 11   • Multiple Vitamin (MULTI VITAMIN DAILY PO) Take 1 tablet by mouth Daily.     • Omega 3 1000 MG capsule Take 1 tablet by mouth 2 (Two) Times a Day. FISH OIL 1000 MG  MG  OMEGA -3     • potassium chloride (K-DUR) 10 MEQ CR tablet Take 10 mEq by mouth Daily.     • pravastatin (PRAVACHOL) 40 MG tablet Take 40 mg by mouth daily.     • risperiDONE (risperDAL) 3 MG tablet Take 3 mg by mouth Every Night.     • vitamin C (ASCORBIC ACID) 500 MG tablet Take 500 mg by mouth Daily.     • digoxin (LANOXIN) 125 MCG tablet Take 1 tablet by mouth Daily. 30 tablet 11   • rivaroxaban (XARELTO) 20 MG tablet Take 1 tablet by mouth Daily. 14 tablet 0     No current facility-administered medications for this visit.        Penicillins and Sulfa antibiotics    Past Medical History:   Diagnosis Date   • Aortic valve stenosis    • Bipolar affective disorder (CMS/[facility])    • CHF (congestive heart failure) (CMS/[facility])    • Diabetes mellitus (CMS/[facility])     DX'D TYPE II NIDDM APPROX 3-4 YEARS AGO, NO MEDS, CHECKS BLOOD SUGAR 2-3 TIMES PER WEEK, AVERAGE    • CLAYTON (generalized anxiety disorder)    • H/O dilation and curettage    • H/O eye surgery     Eye surgery- bilateral   • H/O:     • H/O: hysterectomy     s/p appendectomy   • Hallucinations    • History of AAA (abdominal aortic aneurysm) repair     (pt doesn't recall- on PCP note)   • Hyperlipidemia    • Hypertension    • Hypothyroidism    • Overactive bladder    • Stroke (CMS/[facility])     TIA 3 YEARS AGO, MEMORY LOSS    • SVT (supraventricular tachycardia) (CMS/[facility])    • Tachycardia    • Wears dentures     PARTIALS ON TOP AND BOTTOM    • Wears glasses        Social History     Social History   • Marital status:      Spouse name: N/A   • Number of children: 1   • Years of education: N/A     Occupational History   • RETIRED       Social History Main Topics   • Smoking status: Never Smoker   • Smokeless tobacco: Never Used   • Alcohol use No   • Drug use: No   • Sexual activity: Defer     Other Topics Concern   • Not on file     Social History Narrative    Lives in [place], [place] with .       Family History   Problem Relation Age of  "Onset   • Heart disease Mother    • Diabetes Mother    • Cancer Father         Bone       Review of Systems   Constitution: Positive for malaise/fatigue. Negative for weakness.   Cardiovascular: Negative for chest pain, dyspnea on exertion, irregular heartbeat, orthopnea, palpitations and syncope.   Respiratory: Negative for shortness of breath and wheezing.    Musculoskeletal: Negative for back pain, joint pain and joint swelling.   Gastrointestinal: Negative for anorexia, heartburn, nausea and vomiting.   Genitourinary: Negative for dysuria, hematuria, hesitancy and nocturia.   Neurological: Negative for dizziness, light-headedness and loss of balance.   Psychiatric/Behavioral: Negative for depression and memory loss. The patient is not nervous/anxious.            Objective     /80 (BP Location: Left arm)   Pulse 92   Ht 154.9 cm (60.98\")   Wt 56.2 kg (124 lb)   BMI 23.44 kg/m²     Physical Exam   Constitutional: She is oriented to person, place, and time. She appears well-developed and well-nourished.   HENT:   Head: Normocephalic and atraumatic.   Eyes: Pupils are equal, round, and reactive to light. EOM are normal.   Neck: Normal range of motion. Neck supple.   Cardiovascular: Normal rate and regular rhythm.    Murmur heard.  Pulmonary/Chest: Effort normal and breath sounds normal.   Abdominal: Soft.   Musculoskeletal: Normal range of motion.   Neurological: She is alert and oriented to person, place, and time.   Skin: Skin is warm and dry.   Psychiatric: She has a normal mood and affect. Her behavior is normal.         ECG 12 Lead  Date/Time: 7/23/2018 11:05 AM  Performed by: NGA SOLER  Authorized by: NGA SOLER   Rhythm: paced  BPM: 92  Clinical impression: abnormal ECG            Assessment/Plan     HR is stable but remains on high side of normal at 92.  For reasons unknown to me, digoxin therapy was discontinued.  Since rate is high side of normal, digoxin therapy will be added on as " historically increasing CCB or BB causes unsafe drop in BP. EKG reveals an atrial sensed rhythm with ventricular sensing and normal QT. Flecainide will continue to be held at present as she was seen by EP recently in May where it was noted she was in afib 10% of the time but she was kept on rate control and anticoagulation therapy with plan to do EPS with RFA.  It appears she is now back on synthroid therapy, most recent TSH normal at 2.090.  She does report diarrhea has improved and importance of continuing her xarelto therapy was discussed, samples provided.  Labs continue to be monitored with your office including TSH according to patient.  Could we have next complete panel for our review?  BMI stable at 23.44, continued cardiac diet and activity as tolerated advised.  6 month follow up scheduled or sooner if needed.      Problems Addressed this Visit        Cardiovascular and Mediastinum    HTN (hypertension)    Relevant Medications    metoprolol tartrate (LOPRESSOR) 25 MG tablet    diltiaZEM CD (CARDIZEM CD) 180 MG 24 hr capsule    lisinopril (PRINIVIL,ZESTRIL) 5 MG tablet    Nonrheumatic aortic valve stenosis    Relevant Medications    digoxin (LANOXIN) 125 MCG tablet    metoprolol tartrate (LOPRESSOR) 25 MG tablet    diltiaZEM CD (CARDIZEM CD) 180 MG 24 hr capsule    S/P AVR    SSS (sick sinus syndrome) (CMS/[facility]) - Primary    Relevant Medications    digoxin (LANOXIN) 125 MCG tablet    metoprolol tartrate (LOPRESSOR) 25 MG tablet    diltiaZEM CD (CARDIZEM CD) 180 MG 24 hr capsule    Other Relevant Orders    ECG 12 Lead       Endocrine    Hypothyroid    Relevant Medications    levothyroxine (SYNTHROID, LEVOTHROID) 88 MCG tablet    metoprolol tartrate (LOPRESSOR) 25 MG tablet      Other Visit Diagnoses     PAF (paroxysmal atrial fibrillation) (CMS/[facility])        Relevant Medications    digoxin (LANOXIN) 125 MCG tablet    metoprolol tartrate (LOPRESSOR) 25 MG tablet    diltiaZEM CD (CARDIZEM CD) 180 MG 24 hr capsule     Pure hypercholesterolemia        Other fatigue              Patient's Body mass index is 23.44 kg/m². BMI is within normal parameters. No follow-up required.            Electronically signed by TAYO Velazquez July 23, 2018 5:02 PM

## 2018-07-23 ENCOUNTER — TELEPHONE (OUTPATIENT)
Dept: CARDIOLOGY | Facility: CLINIC | Age: 78
End: 2018-07-23

## 2018-07-23 ENCOUNTER — OFFICE VISIT (OUTPATIENT)
Dept: CARDIOLOGY | Facility: CLINIC | Age: 78
End: 2018-07-23

## 2018-07-23 VITALS
SYSTOLIC BLOOD PRESSURE: 128 MMHG | DIASTOLIC BLOOD PRESSURE: 80 MMHG | BODY MASS INDEX: 23.41 KG/M2 | HEART RATE: 92 BPM | WEIGHT: 124 LBS | HEIGHT: 61 IN

## 2018-07-23 DIAGNOSIS — I49.5 SSS (SICK SINUS SYNDROME) (HCC): Primary | ICD-10-CM

## 2018-07-23 DIAGNOSIS — E78.00 PURE HYPERCHOLESTEROLEMIA: ICD-10-CM

## 2018-07-23 DIAGNOSIS — I35.0 NONRHEUMATIC AORTIC VALVE STENOSIS: ICD-10-CM

## 2018-07-23 DIAGNOSIS — I10 ESSENTIAL HYPERTENSION: ICD-10-CM

## 2018-07-23 DIAGNOSIS — I48.0 PAF (PAROXYSMAL ATRIAL FIBRILLATION) (HCC): ICD-10-CM

## 2018-07-23 DIAGNOSIS — Z95.2 S/P AVR: ICD-10-CM

## 2018-07-23 DIAGNOSIS — E03.9 ACQUIRED HYPOTHYROIDISM: ICD-10-CM

## 2018-07-23 DIAGNOSIS — R53.83 OTHER FATIGUE: ICD-10-CM

## 2018-07-23 PROCEDURE — 99214 OFFICE O/P EST MOD 30 MIN: CPT | Performed by: NURSE PRACTITIONER

## 2018-07-23 PROCEDURE — 93000 ELECTROCARDIOGRAM COMPLETE: CPT | Performed by: NURSE PRACTITIONER

## 2018-07-23 RX ORDER — DILTIAZEM HYDROCHLORIDE 180 MG/1
180 CAPSULE, COATED, EXTENDED RELEASE ORAL DAILY
Qty: 30 CAPSULE | Refills: 11 | Status: SHIPPED | OUTPATIENT
Start: 2018-07-23 | End: 2018-09-08 | Stop reason: HOSPADM

## 2018-07-23 RX ORDER — LEVOTHYROXINE SODIUM 88 UG/1
88 TABLET ORAL DAILY
COMMUNITY

## 2018-07-23 RX ORDER — LISINOPRIL 5 MG/1
5 TABLET ORAL DAILY
Qty: 30 TABLET | Refills: 11 | Status: SHIPPED | OUTPATIENT
Start: 2018-07-23 | End: 2019-01-22 | Stop reason: SDUPTHER

## 2018-07-23 RX ORDER — DIGOXIN 125 MCG
125 TABLET ORAL DAILY
Qty: 30 TABLET | Refills: 11 | Status: ON HOLD | OUTPATIENT
Start: 2018-07-23 | End: 2018-09-07

## 2018-07-23 NOTE — TELEPHONE ENCOUNTER
Patient's  called stating that digoxin and flecainide were stopped when she was in the hospital on 05/09/18. He is wanting to know if you want to start it back? Also, do you want me to get records from hospital?

## 2018-07-23 NOTE — TELEPHONE ENCOUNTER
Patient's  aware for patient to start digoxin. He verbalized understanding.       Records obtained from [facility].

## 2018-07-26 ENCOUNTER — PREP FOR SURGERY (OUTPATIENT)
Dept: OTHER | Facility: HOSPITAL | Age: 78
End: 2018-07-26

## 2018-07-26 DIAGNOSIS — I47.1 SVT (SUPRAVENTRICULAR TACHYCARDIA) (HCC): Primary | ICD-10-CM

## 2018-07-26 RX ORDER — ACETAMINOPHEN 325 MG/1
650 TABLET ORAL EVERY 4 HOURS PRN
Status: CANCELLED | OUTPATIENT
Start: 2018-07-26 | End: 2019-07-26

## 2018-07-26 RX ORDER — PROMETHAZINE HYDROCHLORIDE 25 MG/ML
12.5 INJECTION, SOLUTION INTRAMUSCULAR; INTRAVENOUS EVERY 4 HOURS PRN
Status: CANCELLED | OUTPATIENT
Start: 2018-07-26 | End: 2018-09-24

## 2018-07-26 RX ORDER — SODIUM CHLORIDE 0.9 % (FLUSH) 0.9 %
1-10 SYRINGE (ML) INJECTION AS NEEDED
Status: CANCELLED | OUTPATIENT
Start: 2018-07-26 | End: 2019-07-26

## 2018-07-26 RX ORDER — NITROGLYCERIN 0.4 MG/1
0.4 TABLET SUBLINGUAL
Status: CANCELLED | OUTPATIENT
Start: 2018-07-26 | End: 2019-07-26

## 2018-08-23 ENCOUNTER — TELEPHONE (OUTPATIENT)
Dept: CARDIOLOGY | Facility: CLINIC | Age: 78
End: 2018-08-23

## 2018-09-06 ENCOUNTER — APPOINTMENT (OUTPATIENT)
Dept: PREADMISSION TESTING | Facility: HOSPITAL | Age: 78
End: 2018-09-06

## 2018-09-06 DIAGNOSIS — I47.1 SVT (SUPRAVENTRICULAR TACHYCARDIA) (HCC): ICD-10-CM

## 2018-09-06 LAB
ANION GAP SERPL CALCULATED.3IONS-SCNC: 7 MMOL/L (ref 3–11)
BUN BLD-MCNC: 24 MG/DL (ref 9–23)
BUN/CREAT SERPL: 26.1 (ref 7–25)
CALCIUM SPEC-SCNC: 9.4 MG/DL (ref 8.7–10.4)
CHLORIDE SERPL-SCNC: 103 MMOL/L (ref 99–109)
CO2 SERPL-SCNC: 23 MMOL/L (ref 20–31)
CREAT BLD-MCNC: 0.92 MG/DL (ref 0.6–1.3)
DEPRECATED RDW RBC AUTO: 45.7 FL (ref 37–54)
ERYTHROCYTE [DISTWIDTH] IN BLOOD BY AUTOMATED COUNT: 13.2 % (ref 11.3–14.5)
GFR SERPL CREATININE-BSD FRML MDRD: 59 ML/MIN/1.73
GLUCOSE BLD-MCNC: 85 MG/DL (ref 70–100)
HBA1C MFR BLD: 6 % (ref 4.8–5.6)
HCT VFR BLD AUTO: 38.9 % (ref 34.5–44)
HGB BLD-MCNC: 12.9 G/DL (ref 11.5–15.5)
INR PPP: 1.05 (ref 0.91–1.09)
MCH RBC QN AUTO: 31.7 PG (ref 27–31)
MCHC RBC AUTO-ENTMCNC: 33.2 G/DL (ref 32–36)
MCV RBC AUTO: 95.6 FL (ref 80–99)
PLATELET # BLD AUTO: 228 10*3/MM3 (ref 150–450)
PMV BLD AUTO: 9.3 FL (ref 6–12)
POTASSIUM BLD-SCNC: 5 MMOL/L (ref 3.5–5.5)
PROTHROMBIN TIME: 11 SECONDS (ref 9.6–11.5)
RBC # BLD AUTO: 4.07 10*6/MM3 (ref 3.89–5.14)
SODIUM BLD-SCNC: 133 MMOL/L (ref 132–146)
WBC NRBC COR # BLD: 9.26 10*3/MM3 (ref 3.5–10.8)

## 2018-09-06 PROCEDURE — 80048 BASIC METABOLIC PNL TOTAL CA: CPT | Performed by: NURSE PRACTITIONER

## 2018-09-06 PROCEDURE — 83036 HEMOGLOBIN GLYCOSYLATED A1C: CPT | Performed by: NURSE PRACTITIONER

## 2018-09-06 PROCEDURE — 85027 COMPLETE CBC AUTOMATED: CPT | Performed by: NURSE PRACTITIONER

## 2018-09-06 PROCEDURE — 36415 COLL VENOUS BLD VENIPUNCTURE: CPT

## 2018-09-06 PROCEDURE — 85610 PROTHROMBIN TIME: CPT | Performed by: NURSE PRACTITIONER

## 2018-09-06 RX ORDER — LEVOCETIRIZINE DIHYDROCHLORIDE 5 MG/1
5 TABLET, FILM COATED ORAL EVERY EVENING
COMMUNITY
End: 2020-01-20 | Stop reason: ALTCHOICE

## 2018-09-06 RX ORDER — FUROSEMIDE 20 MG/1
20 TABLET ORAL DAILY
COMMUNITY
End: 2019-01-22 | Stop reason: SDUPTHER

## 2018-09-06 NOTE — DISCHARGE INSTRUCTIONS
The following instructions given during Pre Admission Testing visit:    Do not eat or drink anything after MN except for sips of water with your a.m. Prescription meds unless otherwise instructed by your physician.    Glasses and jewelry may be worn, but dentures must be removed prior to cath/procedure.    Leave any items you consider valuable at home.    Family members may wait in [facility] waiting area during procedure.    Bring all medications in their original containers the day of procedure.    Bring photo ID and insurance cards on the day of procedure.    Need to make arrangements for transportation prior to discharge.    The following handouts were given:     Heart Cath pathway (if applicable)   Cardiac Cath booklet published by [facility]    OR appropriate [facility] procedure booklet    If applicable, pt instructed to bring CPAP mask and tubing the day of procedure.

## 2018-09-07 ENCOUNTER — HOSPITAL ENCOUNTER (OUTPATIENT)
Facility: HOSPITAL | Age: 78
Discharge: HOME | End: 2018-09-08
Attending: INTERNAL MEDICINE | Admitting: INTERNAL MEDICINE

## 2018-09-07 DIAGNOSIS — I47.1 SVT (SUPRAVENTRICULAR TACHYCARDIA) (HCC): ICD-10-CM

## 2018-09-07 PROCEDURE — 93621 COMP EP EVL L PAC&REC C SINS: CPT | Performed by: INTERNAL MEDICINE

## 2018-09-07 PROCEDURE — 94770: CPT

## 2018-09-07 PROCEDURE — 63710000001 RISPERIDONE 1 MG TABLET: Performed by: PHYSICIAN ASSISTANT

## 2018-09-07 PROCEDURE — A9270 NON-COVERED ITEM OR SERVICE: HCPCS | Performed by: PHYSICIAN ASSISTANT

## 2018-09-07 PROCEDURE — 25010000002 VANCOMYCIN PER 500 MG: Performed by: INTERNAL MEDICINE

## 2018-09-07 PROCEDURE — 93286 PERI-PX EVAL PM/LDLS PM IP: CPT | Performed by: INTERNAL MEDICINE

## 2018-09-07 PROCEDURE — C1732 CATH, EP, DIAG/ABL, 3D/VECT: HCPCS | Performed by: INTERNAL MEDICINE

## 2018-09-07 PROCEDURE — 93623 PRGRMD STIMJ&PACG IV RX NFS: CPT | Performed by: INTERNAL MEDICINE

## 2018-09-07 PROCEDURE — 93653 COMPRE EP EVAL TX SVT: CPT | Performed by: INTERNAL MEDICINE

## 2018-09-07 PROCEDURE — 63710000001 CETIRIZINE 10 MG TABLET: Performed by: PHYSICIAN ASSISTANT

## 2018-09-07 PROCEDURE — C1730 CATH, EP, 19 OR FEW ELECT: HCPCS | Performed by: INTERNAL MEDICINE

## 2018-09-07 PROCEDURE — C1894 INTRO/SHEATH, NON-LASER: HCPCS | Performed by: INTERNAL MEDICINE

## 2018-09-07 PROCEDURE — 63710000001 RIVAROXABAN 20 MG TABLET: Performed by: PHYSICIAN ASSISTANT

## 2018-09-07 PROCEDURE — 93613 INTRACARDIAC EPHYS 3D MAPG: CPT | Performed by: INTERNAL MEDICINE

## 2018-09-07 PROCEDURE — 25010000002 ONDANSETRON PER 1 MG: Performed by: INTERNAL MEDICINE

## 2018-09-07 PROCEDURE — 93650 ICAR CATH ABLTJ AV NODE FUNC: CPT | Performed by: INTERNAL MEDICINE

## 2018-09-07 PROCEDURE — 63710000001 FUROSEMIDE 20 MG TABLET: Performed by: PHYSICIAN ASSISTANT

## 2018-09-07 PROCEDURE — 25010000002 MIDAZOLAM PER 1 MG: Performed by: INTERNAL MEDICINE

## 2018-09-07 PROCEDURE — 25010000002 FENTANYL CITRATE (PF) 100 MCG/2ML SOLUTION: Performed by: INTERNAL MEDICINE

## 2018-09-07 RX ORDER — CETIRIZINE HYDROCHLORIDE 10 MG/1
10 TABLET ORAL DAILY
Status: DISCONTINUED | OUTPATIENT
Start: 2018-09-07 | End: 2018-09-08 | Stop reason: HOSPADM

## 2018-09-07 RX ORDER — IPRATROPIUM BROMIDE 21 UG/1
2 SPRAY, METERED NASAL 2 TIMES DAILY PRN
Status: DISCONTINUED | OUTPATIENT
Start: 2018-09-07 | End: 2018-09-08 | Stop reason: HOSPADM

## 2018-09-07 RX ORDER — SODIUM CHLORIDE 0.9 % (FLUSH) 0.9 %
1-10 SYRINGE (ML) INJECTION AS NEEDED
Status: DISCONTINUED | OUTPATIENT
Start: 2018-09-07 | End: 2018-09-07 | Stop reason: HOSPADM

## 2018-09-07 RX ORDER — HYDROCODONE BITARTRATE AND ACETAMINOPHEN 5; 325 MG/1; MG/1
1 TABLET ORAL EVERY 4 HOURS PRN
Status: DISCONTINUED | OUTPATIENT
Start: 2018-09-07 | End: 2018-09-08 | Stop reason: HOSPADM

## 2018-09-07 RX ORDER — SODIUM CHLORIDE 9 MG/ML
INJECTION, SOLUTION INTRAVENOUS CONTINUOUS PRN
Status: COMPLETED | OUTPATIENT
Start: 2018-09-07 | End: 2018-09-07

## 2018-09-07 RX ORDER — FUROSEMIDE 20 MG/1
20 TABLET ORAL DAILY
Status: DISCONTINUED | OUTPATIENT
Start: 2018-09-07 | End: 2018-09-08 | Stop reason: HOSPADM

## 2018-09-07 RX ORDER — ATORVASTATIN CALCIUM 10 MG/1
10 TABLET, FILM COATED ORAL DAILY
Status: DISCONTINUED | OUTPATIENT
Start: 2018-09-08 | End: 2018-09-08 | Stop reason: HOSPADM

## 2018-09-07 RX ORDER — RISPERIDONE 1 MG/1
3 TABLET ORAL NIGHTLY
Status: DISCONTINUED | OUTPATIENT
Start: 2018-09-07 | End: 2018-09-08 | Stop reason: HOSPADM

## 2018-09-07 RX ORDER — LISINOPRIL 5 MG/1
5 TABLET ORAL DAILY
Status: DISCONTINUED | OUTPATIENT
Start: 2018-09-08 | End: 2018-09-08 | Stop reason: HOSPADM

## 2018-09-07 RX ORDER — POTASSIUM CHLORIDE 750 MG/1
10 CAPSULE, EXTENDED RELEASE ORAL DAILY
Status: DISCONTINUED | OUTPATIENT
Start: 2018-09-08 | End: 2018-09-08 | Stop reason: HOSPADM

## 2018-09-07 RX ORDER — MIDAZOLAM HYDROCHLORIDE 1 MG/ML
INJECTION INTRAMUSCULAR; INTRAVENOUS AS NEEDED
Status: DISCONTINUED | OUTPATIENT
Start: 2018-09-07 | End: 2018-09-07 | Stop reason: HOSPADM

## 2018-09-07 RX ORDER — ONDANSETRON 2 MG/ML
INJECTION INTRAMUSCULAR; INTRAVENOUS AS NEEDED
Status: DISCONTINUED | OUTPATIENT
Start: 2018-09-07 | End: 2018-09-07 | Stop reason: HOSPADM

## 2018-09-07 RX ORDER — NITROGLYCERIN 0.4 MG/1
0.4 TABLET SUBLINGUAL
Status: DISCONTINUED | OUTPATIENT
Start: 2018-09-07 | End: 2018-09-07 | Stop reason: HOSPADM

## 2018-09-07 RX ORDER — PROMETHAZINE HYDROCHLORIDE 25 MG/ML
12.5 INJECTION, SOLUTION INTRAMUSCULAR; INTRAVENOUS EVERY 4 HOURS PRN
Status: DISCONTINUED | OUTPATIENT
Start: 2018-09-07 | End: 2018-09-07 | Stop reason: HOSPADM

## 2018-09-07 RX ORDER — ASPIRIN 81 MG/1
81 TABLET ORAL DAILY
Status: DISCONTINUED | OUTPATIENT
Start: 2018-09-08 | End: 2018-09-08 | Stop reason: HOSPADM

## 2018-09-07 RX ORDER — ACETAMINOPHEN 325 MG/1
650 TABLET ORAL EVERY 6 HOURS PRN
Status: DISCONTINUED | OUTPATIENT
Start: 2018-09-07 | End: 2018-09-08 | Stop reason: HOSPADM

## 2018-09-07 RX ORDER — FENTANYL CITRATE 50 UG/ML
INJECTION, SOLUTION INTRAMUSCULAR; INTRAVENOUS AS NEEDED
Status: DISCONTINUED | OUTPATIENT
Start: 2018-09-07 | End: 2018-09-07 | Stop reason: HOSPADM

## 2018-09-07 RX ORDER — LEVOTHYROXINE SODIUM 88 UG/1
88 TABLET ORAL
Status: DISCONTINUED | OUTPATIENT
Start: 2018-09-08 | End: 2018-09-08 | Stop reason: HOSPADM

## 2018-09-07 RX ORDER — ACETAMINOPHEN 325 MG/1
650 TABLET ORAL EVERY 4 HOURS PRN
Status: DISCONTINUED | OUTPATIENT
Start: 2018-09-07 | End: 2018-09-07

## 2018-09-07 RX ORDER — DOCUSATE SODIUM 100 MG/1
100 CAPSULE, LIQUID FILLED ORAL 2 TIMES DAILY PRN
Status: DISCONTINUED | OUTPATIENT
Start: 2018-09-07 | End: 2018-09-08 | Stop reason: HOSPADM

## 2018-09-07 RX ORDER — ONDANSETRON 2 MG/ML
4 INJECTION INTRAMUSCULAR; INTRAVENOUS EVERY 6 HOURS PRN
Status: DISCONTINUED | OUTPATIENT
Start: 2018-09-07 | End: 2018-09-08 | Stop reason: HOSPADM

## 2018-09-07 RX ORDER — LIDOCAINE HYDROCHLORIDE 10 MG/ML
INJECTION, SOLUTION INFILTRATION; PERINEURAL AS NEEDED
Status: DISCONTINUED | OUTPATIENT
Start: 2018-09-07 | End: 2018-09-07 | Stop reason: HOSPADM

## 2018-09-07 RX ADMIN — RISPERIDONE 3 MG: 1 TABLET ORAL at 22:48

## 2018-09-07 RX ADMIN — CETIRIZINE HYDROCHLORIDE 10 MG: 10 TABLET, FILM COATED ORAL at 16:44

## 2018-09-07 RX ADMIN — RIVAROXABAN 20 MG: 20 TABLET, FILM COATED ORAL at 16:44

## 2018-09-07 RX ADMIN — Medication 750 MG: at 14:40

## 2018-09-07 RX ADMIN — FUROSEMIDE 20 MG: 20 TABLET ORAL at 16:44

## 2018-09-07 NOTE — H&P
Cardiology H&P     Debbie B Lay  1940    There is no work phone number on file.    09/07/18    DATE OF ADMISSION: 9/7/2018  [facility] Reagan Castellon MD  [address] / [place] [address]    CC: SVT    1.  NICM:              A.  Left heart catheterization 9/11/2002: EF 30%, normal coronary arteries              B.  Myoview stress test 5/22/2003: Normal stress test, data deficit              C.  Echocardiogram 9/15/2003: EF 55%, RVSP 42 mmHg, data deficit              D. Echocardiogram 3/12/2013 EF 60%, RVSP 42 mmHg              E. Myoview stress test 3/12/2013 negative for ischemia              F. Echocardiogram 10/15/2013: EF 35%, moderate MR, moderate AI, RVSP 40 mmHg              G. Transesophageal echocardiogram 9/6/2013 EF less than 35%, moderate AF, AI, and MR              H. MUGA scan 1/22/2014: EF 62%              I.  Echocardiogram 6/9/2015: EF 45-50%, moderate MR, moderate AF, moderate AI, RVSP 35 mmHg              J. Echocardiogram 2/21/2017: EF 55-60%              K. Echocardiogram 8/30/17: EF 40-45%, moderate to severe AS, moderate AI, mild MR, RVSP 38 mm Hg              L. Left heart catheterization 10/2017: Normal coronary arteries, significant AS and AI with AVR recommended              M. Echocardiogram 12/15/2017: EF 35-40%, left bundle branch block, mild to moderate MR              N. S/p BSC Bi-V PM with Dr. Dickey 12/19/17              O. Echocardiogram 5/1/18: EF 45-50%, moderate MR, RVSP 35 mmHg, mild LVH  2. SVT              A. Noted on device interrogation 7/2018  3. Atrial fibrillation:              A. Developed postoperatively after tissue aortic valve replacement in November, initiated on flecainide and Xarelto              B. 7 day Holter monitor 5/7/2018:16.3% A. fib burden, average heart rate 90 bpm, minimum 51 bpm, maximum 202 bpm   4. Valvular heart disease:              A. Aortic stenosis s/p tissue AVR 11/10/17 with Dr. Darling, EF during  intra-operative MARYSE showing EF 60%  5. Tachybrady syndrome:              A. S/p C BI-V PM with Dr. Dickey 17  6. Hypertension  7. Hyperlipidemia  8. Hypothyroidism  9. Diabetes mellitus, type II  10. Bipolar disorder  11. TIA  12. Generalized anxiety disorder  13. Surgical history:              A. D&C              B.  section              C. Hysterectomy              D. Appendectomy              E. Eye surgery         History of Present Illness:   Patient is a 77 year old  female with the above-noted past medical history who presents for EPS +/- RFA of SVT. She was noted to have SVT on her device interrogation in May during her consultation with Dr. Chavira.  As noted above, she has a long-standing history of nonischemic cardiomyopathy and had a normal left heart catheterization in .  She has a history of severe aortic stenosis and underwent tissue aortic valve replacement with Dr. Darling in 2017.  She did develop postoperative atrial fibrillation and subsequently has been on flecainide and anticoagulated with Xarelto, tolerating well with no bleeding issues or TIA/CVA symptoms.  She also had evidence of tachy-ainsley syndrome with a left bundle branch block for which she underwent Bi-V PPM implant with Dr. Diceky.  It was felt her tachycardia would be able to be controlled better once PM in place.  Most recent echocardiogram earlier this month showing an EF of 45-50%.  She was hospitalized in May for tachycardia episodes, which seem to be consistent with SVT.  She states she was given IV medicine to slow it down, does not remember the medication.  She has not had any recent tachycardia episodes. She has been off her Digoxin and Flecainide - details unknown of why she is off both of these medications, but she does feel better off of them. She states she did have one syncopal episode a few months ago prior to presenting to the emergency room.  BP's well controlled.  Last  thyroid levels were high so her synthroid was decreased. She also was having diarrhea back in the summer which stopped after she stopped taking Magnesium.     Allergies   Allergen Reactions   • Penicillins Rash   • Sulfa Antibiotics Rash       Prior to Admission Medications     Prescriptions Last Dose Informant Patient Reported? Taking?    acetaminophen (TYLENOL) 325 MG tablet  Medication Bottle Yes No    Take 650 mg by mouth Every 6 (Six) Hours As Needed for Mild Pain .    aspirin 81 MG EC tablet  Medication Bottle Yes No    Take 81 mg by mouth daily.    calcium carbonate (OS-PABLO) 600 MG tablet  Medication Bottle Yes No    Take 600 mg by mouth Daily. CALCIUM 600 MG AND VITAMIN D3 800 IU    cetirizine (zyrTEC) 10 MG tablet  Medication Bottle Yes No    Take 10 mg by mouth Daily.    digoxin (LANOXIN) 125 MCG tablet   No No    Take 1 tablet by mouth Daily.    diltiaZEM CD (CARDIZEM CD) 180 MG 24 hr capsule  Medication Bottle No No    Take 1 capsule by mouth Daily.    docusate sodium (COLACE) 100 MG capsule  Medication Bottle Yes No    Take 100 mg by mouth 2 (Two) Times a Day As Needed.    ferrous sulfate 325 (65 FE) MG tablet  Medication Bottle No No    Take 1 tablet by mouth 3 (Three) Times a Day With Meals.    Patient taking differently:  Take 325 mg by mouth 2 (Two) Times a Day.    Fluticasone Furoate-Vilanterol (BREO ELLIPTA IN)  Medication Bottle Yes No    Inhale 1 capsule Daily.    furosemide (LASIX) 20 MG tablet  Medication Bottle No No    Take 1 tablet by mouth Daily.    furosemide (LASIX) 20 MG tablet   Yes No    Take 20 mg by mouth 2 (Two) Times a Day.    ipratropium (ATROVENT) 0.03 % nasal spray  Medication Bottle Yes No    4 sprays into each nostril 2 (Two) Times a Day As Needed for Rhinitis.    levocetirizine (XYZAL) 5 MG tablet  Medication Bottle Yes No    Take 5 mg by mouth Every Evening.    levothyroxine (SYNTHROID, LEVOTHROID) 88 MCG tablet  Medication Bottle Yes No    Take 88 mcg by mouth Daily.     lisinopril (PRINIVIL,ZESTRIL) 5 MG tablet  Medication Bottle No No    Take 1 tablet by mouth Daily.    magnesium oxide (MAGOX) 400 (241.3 Mg) MG tablet tablet   Yes No    Take 400 mg by mouth 2 (Two) Times a Day.    metoprolol tartrate (LOPRESSOR) 25 MG tablet  Medication Bottle No No    Take 0.5 tablets by mouth As Needed (for elevated heart rate above 100).    Multiple Vitamin (MULTI VITAMIN DAILY PO)  Medication Bottle Yes No    Take 1 tablet by mouth Daily.    Omega 3 1000 MG capsule  Medication Bottle Yes No    Take 1 tablet by mouth 2 (Two) Times a Day. FISH OIL 1000 MG  MG OMEGA -3    potassium chloride (K-DUR) 10 MEQ CR tablet  Medication Bottle Yes No    Take 10 mEq by mouth Daily.    pravastatin (PRAVACHOL) 40 MG tablet  Medication Bottle Yes No    Take 40 mg by mouth daily.    risperiDONE (risperDAL) 3 MG tablet  Medication Bottle Yes No    Take 3 mg by mouth Every Night.    rivaroxaban (XARELTO) 20 MG tablet  Medication Bottle No No    Take 1 tablet by mouth Daily.    vitamin C (ASCORBIC ACID) 500 MG tablet  Medication Bottle Yes No    Take 500 mg by mouth Daily.            Current Facility-Administered Medications:   •  acetaminophen (TYLENOL) tablet 650 mg, 650 mg, Oral, Q4H PRN, Ceci Mcleod, APRN  •  nitroglycerin (NITROSTAT) SL tablet 0.4 mg, 0.4 mg, Sublingual, Q5 Min PRN, Ceci Mcleod, APRN  •  promethazine (PHENERGAN) injection 12.5 mg, 12.5 mg, Intravenous, Q4H PRN, Ceci Mcleod, APRN  •  sodium chloride 0.9 % flush 1-10 mL, 1-10 mL, Intravenous, PRN, Ceci Mcleod, APRN    Social History     Social History   • Marital status:    • Number of children: 1     Occupational History   • RETIRED       Social History Main Topics   • Smoking status: Never Smoker   • Smokeless tobacco: Never Used   • Alcohol use No   • Drug use: No   • Sexual activity: Defer     Other Topics Concern   • Not on file     Social History Narrative  "   Lives in [place], [place] with .       Family History   Problem Relation Age of Onset   • Heart disease Mother    • Diabetes Mother    • Cancer Father         Bone       REVIEW OF SYSTEMS:   CONST:  No weight loss, fever, chills, weakness + fatigue.   HEENT:  No visual loss, blurred vision, double vision, yellow sclerae.                   No hearing loss, congestion, sore throat.   SKIN:      No rashes, urticaria, ulcers, sores.     RESP:     No shortness of breath, hemoptysis, cough, sputum.   GI:           No anorexia, nausea, vomiting, +diarrhea (now resolved). No abdominal pain, melena.   :         No burning on urination, hematuria or increased frequency.  ENDO:    No diaphoresis, cold or heat intolerance. No polyuria or polydipsia.   NEURO:  No headache, dizziness, syncope, paralysis, ataxia, or parasthesias.                  No change in bowel or bladder control. No history of CVA/TIA  MUSC:    No muscle, back pain, joint pain or stiffness.   HEME:    No anemia, bleeding, bruising. No history of DVT/PE.  PSYCH:  No history of depression, anxiety    Vitals:    09/07/18 1145   BP: 143/69   BP Location: Right arm   Patient Position: Lying   Pulse: 77   Resp: 16   Temp: 96.6 °F (35.9 °C)   TempSrc: Temporal Artery    SpO2: 99%   Weight: 55.4 kg (122 lb 2.2 oz)   Height: 154.9 cm (61\")         Vital Sign Min/Max for last 24 hours  Temp  Min: 96.6 °F (35.9 °C)  Max: 96.6 °F (35.9 °C)   BP  Min: 143/69  Max: 143/69   Pulse  Min: 77  Max: 77   Resp  Min: 16  Max: 16   SpO2  Min: 99 %  Max: 99 %   No Data Recorded    No intake or output data in the 24 hours ending 09/07/18 1219          Physical Exam:  GEN: Well nourished, though petite, Well- developed  No acute distress.   HEENT: Normocephalic, Atraumatic, PERRLA, moist mucous membranes  NECK: supple, NO JVD, no thyromegaly, no lymphadenopathy  CARD: S1S2  RRR no murmur, gallop, rub  LUNGS: Clear to auscultataion, normal respiratory effort  ABDOMEN: Soft, " nontender, normal bowel sounds  EXTREMITIES:No gross deformities,  No clubbing, cyanosis, or edema  SKIN: Warm, dry  NEURO: No focal deficits  PSYCHIATRIC: Normal affect and mood      I personally viewed and interpreted the patient's EKG/Telemetry/lab data    Data:     Results from last 7 days  Lab Units 09/06/18  1652   WBC 10*3/mm3 9.26   HEMOGLOBIN g/dL 12.9   HEMATOCRIT % 38.9   PLATELETS 10*3/mm3 228       Results from last 7 days  Lab Units 09/06/18  1652   SODIUM mmol/L 133   POTASSIUM mmol/L 5.0   CHLORIDE mmol/L 103   CO2 mmol/L 23.0   BUN mg/dL 24*   CREATININE mg/dL 0.92   GLUCOSE mg/dL 85        Results from last 7 days  Lab Units 09/06/18  1652   HEMOGLOBIN A1C % 6.00*               Results from last 7 days  Lab Units 09/06/18  1652   PROTIME Seconds 11.0   INR  1.05                 No intake or output data in the 24 hours ending 09/07/18 1219    Device interrogation 5/25/18 showing Bi-V PPM with normal function, <1% RA paced, 89% Bi-V paced, 9 years battery life, 10% SVT/AF, most recent episode 5/22/18    Telemetry: NSR          Assessment and Plan:   1. SVT:  - Symptomatic episodes of SVT requiring ED admission: She will undergo EPS +/- RFA SVT today with Dr. Chavira. The risks, benefits, and alternatives of the procedure have been reviewed and the patient wishes to proceed. Of note, she has been off Flecainide and Digoxin.  2. Paroxysmal atrial fibrillation:  - Noted post-operatively after valve surgery in November  - on Xarelto 20 mg daily.   3. NICM:  - Last EF 45-50%, on Lisinopril and Metoprolol  4. SSS:  - S/p Bi-V PPM (BSC) implant 12/2017         [facility]  9/7/2018   12:19 PM      IBenito MD, personally performed the services face to face as described and documented by the above named individual. I have made any necessary edits and it is both accurate and complete 9/7/2018  1:24 PM

## 2018-09-08 VITALS
TEMPERATURE: 97.6 F | WEIGHT: 125 LBS | DIASTOLIC BLOOD PRESSURE: 60 MMHG | SYSTOLIC BLOOD PRESSURE: 98 MMHG | BODY MASS INDEX: 23.6 KG/M2 | RESPIRATION RATE: 14 BRPM | HEIGHT: 61 IN | HEART RATE: 80 BPM | OXYGEN SATURATION: 96 %

## 2018-09-08 PROCEDURE — A9270 NON-COVERED ITEM OR SERVICE: HCPCS | Performed by: PHYSICIAN ASSISTANT

## 2018-09-08 PROCEDURE — 63710000001 CETIRIZINE 10 MG TABLET: Performed by: PHYSICIAN ASSISTANT

## 2018-09-08 PROCEDURE — A9270 NON-COVERED ITEM OR SERVICE: HCPCS | Performed by: INTERNAL MEDICINE

## 2018-09-08 PROCEDURE — 63710000001 LISINOPRIL 5 MG TABLET: Performed by: PHYSICIAN ASSISTANT

## 2018-09-08 PROCEDURE — 63710000001 ATORVASTATIN 10 MG TABLET: Performed by: PHYSICIAN ASSISTANT

## 2018-09-08 PROCEDURE — 63710000001 ASPIRIN 81 MG TABLET DELAYED-RELEASE: Performed by: PHYSICIAN ASSISTANT

## 2018-09-08 PROCEDURE — 63710000001 FUROSEMIDE 20 MG TABLET: Performed by: PHYSICIAN ASSISTANT

## 2018-09-08 PROCEDURE — 63710000001 POTASSIUM CHLORIDE 10 MEQ CAPSULE CONTROLLED-RELEASE: Performed by: PHYSICIAN ASSISTANT

## 2018-09-08 PROCEDURE — 63710000001 HYDROCODONE-ACETAMINOPHEN 5-325 MG TABLET: Performed by: INTERNAL MEDICINE

## 2018-09-08 PROCEDURE — 63710000001 RIVAROXABAN 20 MG TABLET: Performed by: PHYSICIAN ASSISTANT

## 2018-09-08 PROCEDURE — 63710000001 LEVOTHYROXINE 88 MCG TABLET: Performed by: PHYSICIAN ASSISTANT

## 2018-09-08 PROCEDURE — 99213 OFFICE O/P EST LOW 20 MIN: CPT | Performed by: PHYSICIAN ASSISTANT

## 2018-09-08 RX ADMIN — FUROSEMIDE 20 MG: 20 TABLET ORAL at 09:00

## 2018-09-08 RX ADMIN — RIVAROXABAN 20 MG: 20 TABLET, FILM COATED ORAL at 09:00

## 2018-09-08 RX ADMIN — CETIRIZINE HYDROCHLORIDE 10 MG: 10 TABLET, FILM COATED ORAL at 09:00

## 2018-09-08 RX ADMIN — POTASSIUM CHLORIDE 10 MEQ: 750 CAPSULE, EXTENDED RELEASE ORAL at 09:00

## 2018-09-08 RX ADMIN — ASPIRIN 81 MG: 81 TABLET, COATED ORAL at 09:00

## 2018-09-08 RX ADMIN — LEVOTHYROXINE SODIUM 88 MCG: 88 TABLET ORAL at 06:26

## 2018-09-08 RX ADMIN — ATORVASTATIN CALCIUM 10 MG: 10 TABLET, FILM COATED ORAL at 09:00

## 2018-09-08 RX ADMIN — LISINOPRIL 5 MG: 5 TABLET ORAL at 09:00

## 2018-09-08 RX ADMIN — HYDROCODONE BITARTATE AND ACETAMINOPHEN 1 TABLET: 5; 325 TABLET ORAL at 04:42

## 2018-09-08 NOTE — PLAN OF CARE
Problem: Patient Care Overview  Goal: Plan of Care Review  Outcome: Ongoing (interventions implemented as appropriate)   09/08/18 2482   Coping/Psychosocial   Plan of Care Reviewed With patient;spouse   Plan of Care Review   Progress improving   OTHER   Outcome Summary room air AV /V paced sites clean and dry ambulating with stand by pain pill once for biltateral groion pain hoping to go home this am

## 2018-09-08 NOTE — DISCHARGE SUMMARY
Date of Discharge:  9/8/2018  Date of Admit: 9/7/2018    Reagan Bhatti MD      Discharge Diagnosis:Principal Problem:    SVT (supraventricular tachycardia) (CMS/HCC)      Hospital Course:     Patient is a 77 year old female that presented yesterday for an SVT ablation. She underwent the procedure without any complications. She had a successful ablation of an inducible counterclockwise right atrial flutter as well as RFA of the AV node. She was monitored overnight and had no issues. Her groins and IJ access sites were stable and she had ambulated with no issues. She was given instructions regarding activity limitations and instructed to follow-up in 3 months. She was continued on her home lisinopril, but metoprolol was held due to hypotension.     Procedures Performed  Procedure(s):  Ablation SVT       Consults     No orders found for last 30 day(s).          Discharge Physical Exam:    General Appearance No acute distress   Neck No adenopathy, supple, trachea midline, no thyromegaly, no carotid bruit, no JVD; IJ access site with no hematoma    Lungs Clear to auscultation,respirations regular, even and unlabored   Heart Regular rhythm and normal rate, normal S1 and S2, no murmur, no gallop, no rub, no click   Chest wall No abnormalities observed   Abdomen Normal bowel sounds, no masses, no hepatomegaly, soft   Extremities Moves all extremities well, no edema, no cyanosis, no redness; bilateral groin sites CDI with no hematoma.    Neurological Alert and oriented x 3     Discharge Medications     Discharge Medications      Changes to Medications      Instructions Start Date   ferrous sulfate 325 (65 FE) MG tablet  What changed:  when to take this   325 mg, Oral, 3 Times Daily With Meals         Continue These Medications      Instructions Start Date   acetaminophen 325 MG tablet  Commonly known as:  TYLENOL   650 mg, Oral, Every 6 Hours PRN      aspirin 81 MG EC tablet   81 mg, Oral, Daily      BREO ELLIPTA IN   1  capsule, Inhalation, Daily      calcium carbonate 600 MG tablet  Commonly known as:  OS-PABLO   600 mg, Oral, Daily, CALCIUM 600 MG AND VITAMIN D3 800 IU      docusate sodium 100 MG capsule  Commonly known as:  COLACE   100 mg, Oral, 2 Times Daily PRN      furosemide 20 MG tablet  Commonly known as:  LASIX   20 mg, Oral, Daily      ipratropium 0.03 % nasal spray  Commonly known as:  ATROVENT   4 sprays, Nasal, 2 Times Daily PRN      levocetirizine 5 MG tablet  Commonly known as:  XYZAL   5 mg, Oral, Every Evening      levothyroxine 88 MCG tablet  Commonly known as:  SYNTHROID, LEVOTHROID   88 mcg, Oral, Daily      lisinopril 5 MG tablet  Commonly known as:  PRINIVIL,ZESTRIL   5 mg, Oral, Daily      metoprolol tartrate 25 MG tablet  Commonly known as:  LOPRESSOR   12.5 mg, Oral, As Needed      MULTI VITAMIN DAILY PO   1 tablet, Oral, Daily      Omega 3 1000 MG capsule   1 tablet, Oral, 2 Times Daily, FISH OIL 1000 MG  MG OMEGA -3      potassium chloride 10 MEQ CR tablet  Commonly known as:  K-DUR   10 mEq, Oral, Daily      pravastatin 40 MG tablet  Commonly known as:  PRAVACHOL   40 mg, Oral, Daily      risperiDONE 3 MG tablet  Commonly known as:  risperDAL   3 mg, Oral, Nightly      rivaroxaban 20 MG tablet  Commonly known as:  XARELTO   20 mg, Oral, Daily      vitamin C 500 MG tablet  Commonly known as:  ASCORBIC ACID   500 mg, Oral, Daily         Stop These Medications    diltiaZEM  MG 24 hr capsule  Commonly known as:  CARDIZEM CD            Discharge Diet: regular    Activity at Discharge: avoid climbing stairs for 2-3 days; can resume normal activity in 10 days. No lifting >5lbs for 10 days.      Discharge disposition: home    Condition on Discharge: stable    Follow-up Appointments  Future Appointments  Date Time Provider Department Center   12/14/2018 1:30 PM Benito Chavira MD [facility] None   1/22/2019 9:15 AM Marta Sim APRN [facility] None         Test Results Pending at  Discharge       ADRIENNE Delgadillo  09/08/18  7:59 AM    < 30 min spent in reviewing records, discussion and examination of the patient and discussion with other members of the patient's medical team.

## 2018-10-09 ENCOUNTER — TELEPHONE (OUTPATIENT)
Dept: CARDIOLOGY | Facility: CLINIC | Age: 78
End: 2018-10-09

## 2018-12-06 ENCOUNTER — TELEPHONE (OUTPATIENT)
Dept: CARDIOLOGY | Facility: CLINIC | Age: 78
End: 2018-12-06

## 2018-12-06 NOTE — TELEPHONE ENCOUNTER
Received fax from Dr. Gonzalez for cardiac clearance for patient to have a tooth extraction. Patient is on Xarelto and they are requesting to hold for 2 days prior. According to our records, I do not see where patient has had any stenting, but she did have a valve replacement/repair on 11/10/17.        Fax [phone]

## 2018-12-14 ENCOUNTER — OFFICE VISIT (OUTPATIENT)
Dept: CARDIOLOGY | Facility: CLINIC | Age: 78
End: 2018-12-14

## 2018-12-14 VITALS
HEIGHT: 61 IN | WEIGHT: 131 LBS | SYSTOLIC BLOOD PRESSURE: 144 MMHG | BODY MASS INDEX: 24.73 KG/M2 | DIASTOLIC BLOOD PRESSURE: 78 MMHG | HEART RATE: 95 BPM

## 2018-12-14 DIAGNOSIS — I48.0 PAROXYSMAL ATRIAL FIBRILLATION (HCC): ICD-10-CM

## 2018-12-14 DIAGNOSIS — I48.3 TYPICAL ATRIAL FLUTTER (HCC): ICD-10-CM

## 2018-12-14 DIAGNOSIS — I42.8 NICM (NONISCHEMIC CARDIOMYOPATHY) (HCC): Primary | ICD-10-CM

## 2018-12-14 PROCEDURE — 93281 PM DEVICE PROGR EVAL MULTI: CPT | Performed by: INTERNAL MEDICINE

## 2018-12-14 PROCEDURE — 99213 OFFICE O/P EST LOW 20 MIN: CPT | Performed by: INTERNAL MEDICINE

## 2018-12-14 NOTE — PROGRESS NOTES
Debbie B Lay  1940  [phone]    12/14/2018    [facility] CARDIOLOGY     Reagan Bhatti MD  [address]  [id number]    Chief Complaint   Patient presents with   • SSS (sick sinus syndrome) (CMS/Former[facility])       Problem List:   1.  NICM:              A.  Left heart catheterization 9/11/2002: EF 30%, normal coronary arteries              B.  Myoview stress test 5/22/2003: Normal stress test, data deficit              C.  Echocardiogram 9/15/2003: EF 55%, RVSP 42 mmHg, data deficit              D. Echocardiogram 3/12/2013 EF 60%, RVSP 42 mmHg              E. Myoview stress test 3/12/2013 negative for ischemia              F. Echocardiogram 10/15/2013: EF 35%, moderate MR, moderate AI, RVSP 40 mmHg              G. Transesophageal echocardiogram 9/6/2013 EF less than 35%, moderate AF, AI, and MR              H. MUGA scan 1/22/2014: EF 62%              I.  Echocardiogram 6/9/2015: EF 45-50%, moderate MR, moderate AF, moderate AI, RVSP 35 mmHg              J. Echocardiogram 2/21/2017: EF 55-60%              K. Echocardiogram 8/30/17: EF 40-45%, moderate to severe AS, moderate AI, mild MR, RVSP 38 mm Hg              L. Left heart catheterization 10/2017: Normal coronary arteries, significant AS and AI with AVR recommended              M. Echocardiogram 12/15/2017: EF 35-40%, left bundle branch block, mild to moderate MR              N. S/p BSC Bi-V PM with Dr. Dickey 12/19/17              O. Echocardiogram 5/1/18: EF 45-50%, moderate MR, RVSP 35 mmHg, mild LVH  2. SVT/Atrial Flutter               A. Noted on device interrogation 7/2018   B. Typical Atrial Flutter Ablation 9/7/18  3. Atrial fibrillation:              A. Developed postoperatively after tissue aortic valve replacement in November, initiated on flecainide and Xarelto              B. 7 day Holter monitor 5/7/2018:16.3% A. fib burden, average heart rate 90 bpm, minimum 51 bpm, maximum 202 bpm    C. AVN ablation  18  4. Valvular heart disease:              A. Aortic stenosis s/p tissue AVR 11/10/17 with Dr. Darling, EF during intra-operative MARYSE showing EF 60%  5. Tachybrady syndrome:              A. S/p BSC BI-V PM with Dr. Dickey 17  6. Hypertension  7. Hyperlipidemia  8. Hypothyroidism  9. Diabetes mellitus, type II  10. Bipolar disorder  11. TIA  12. Generalized anxiety disorder  13. Surgical history:              A. D&C              B.  section              C. Hysterectomy              D. Appendectomy              E. Eye surgery     Allergies  Allergies   Allergen Reactions   • Codeine GI Intolerance   • Penicillins Rash   • Sulfa Antibiotics Rash       Current Medications    Current Outpatient Medications:   •  acetaminophen (TYLENOL) 325 MG tablet, Take 650 mg by mouth Every 6 (Six) Hours As Needed for Mild Pain ., Disp: , Rfl:   •  aspirin 81 MG EC tablet, Take 81 mg by mouth daily., Disp: , Rfl:   •  calcium carbonate (OS-PABLO) 600 MG tablet, Take 600 mg by mouth Daily. CALCIUM 600 MG AND VITAMIN D3 800 IU, Disp: , Rfl:   •  docusate sodium (COLACE) 100 MG capsule, Take 100 mg by mouth 2 (Two) Times a Day As Needed., Disp: , Rfl:   •  ferrous sulfate 325 (65 FE) MG tablet, Take 1 tablet by mouth 3 (Three) Times a Day With Meals. (Patient taking differently: Take 325 mg by mouth 2 (Two) Times a Day.), Disp: 90 tablet, Rfl: 11  •  Fluticasone Furoate-Vilanterol (BREO ELLIPTA IN), Inhale 1 capsule Daily., Disp: , Rfl:   •  furosemide (LASIX) 20 MG tablet, Take 20 mg by mouth Daily., Disp: , Rfl:   •  ipratropium (ATROVENT) 0.03 % nasal spray, 4 sprays into each nostril 2 (Two) Times a Day As Needed for Rhinitis., Disp: , Rfl:   •  levocetirizine (XYZAL) 5 MG tablet, Take 5 mg by mouth Every Evening., Disp: , Rfl:   •  levothyroxine (SYNTHROID, LEVOTHROID) 88 MCG tablet, Take 88 mcg by mouth Daily., Disp: , Rfl:   •  lisinopril (PRINIVIL,ZESTRIL) 5 MG tablet, Take 1 tablet by mouth Daily., Disp: 30  "tablet, Rfl: 11  •  metoprolol tartrate (LOPRESSOR) 25 MG tablet, Take 0.5 tablets by mouth As Needed (for elevated heart rate above 100)., Disp: 30 tablet, Rfl: 11  •  Multiple Vitamin (MULTI VITAMIN DAILY PO), Take 1 tablet by mouth Daily., Disp: , Rfl:   •  Omega 3 1000 MG capsule, Take 1 tablet by mouth 2 (Two) Times a Day. FISH OIL 1000 MG  MG OMEGA -3, Disp: , Rfl:   •  potassium chloride (K-DUR) 10 MEQ CR tablet, Take 10 mEq by mouth Daily., Disp: , Rfl:   •  pravastatin (PRAVACHOL) 40 MG tablet, Take 40 mg by mouth daily., Disp: , Rfl:   •  risperiDONE (risperDAL) 3 MG tablet, Take 3 mg by mouth Every Night., Disp: , Rfl:   •  rivaroxaban (XARELTO) 20 MG tablet, Take 1 tablet by mouth Daily., Disp: 14 tablet, Rfl: 0  •  vitamin C (ASCORBIC ACID) 500 MG tablet, Take 500 mg by mouth Daily., Disp: , Rfl:     History of Present Illness     Pt presents for follow up of atrial flutter, atrial fibrillation s/p AVN and, BIV PM check. Since her ablation, she has still better. She cannot describe her symptoms to me. She had PNA in November and had a lot of coughing. She was told she had costochondritis. She has sharp chest pain. Her  states her HRs are in the 90s. She denies SOB or palpitations. No syncope. She states that she does not have energy. No edema. She is on Xarelto with no major bleeding issues. Her BPs at home are around 130-150 systolic.     ROS:  General:  + fatigue, - weight gain or loss  Cardiovascular:  + atypical CP, - PND, syncope, near syncope, edema or palpitations.  Pulmonary:  Denies CUNHA, +cough, - wheezing      Vitals:    12/14/18 1325   BP: 144/78   BP Location: Left arm   Patient Position: Sitting   Pulse: 95   Weight: 59.4 kg (131 lb)   Height: 154.9 cm (61\")     Body mass index is 24.75 kg/m².  PE:  General: NAD  Neck: no JVD, no carotid bruits, no TM  Heart RRR, NL S1, S2, S4 present, no rubs, murmurs  Lungs: CTA, no wheezes, rhonchi, or rales  Abd: soft, non-tender, NL " BS  Ext: No musculoskeletal deformities, no edema, cyanosis, or clubbing  Psych: normal mood and affect    Diagnostic Data:    BiV ICD check: 100% V paced. 25% A paced. 1% mode switched. 9 years on battery. Changed lower rate to 70 bpm. Changed RV output to 2.5 V @ 0.5 ms    Procedures    1. NICM (nonischemic cardiomyopathy) (CMS/HCC)    2. Typical atrial flutter (CMS/HCC)    3. Paroxysmal atrial fibrillation (CMS/HCC)          Plan:  1. NICM:  - EF 45-50%  - on ACE but not on BB.   - also sinus rate is 90 bpm. Will add Coreg 6.25 mg BID. Watch BP.     2. Typical Atrial Flutter:  - s/p RFA 9/2018  - on Xarelto     3. PAF:  - 1% mode switched, appears to be flutter   - s/p AVN ablation with normal device function    F/up in 6 months    Scribed for Benito Chavira MD by Geetha Kate PA-C. 12/14/2018  1:55 PM     IBenito MD, personally performed the services described in this documentation as scribed by the above named individual in my presence, and it is both accurate and complete.  12/14/2018  2:01 PM

## 2018-12-17 RX ORDER — CARVEDILOL 6.25 MG/1
6.25 TABLET ORAL 2 TIMES DAILY
Qty: 60 TABLET | Refills: 11 | Status: SHIPPED | OUTPATIENT
Start: 2018-12-17 | End: 2019-01-22 | Stop reason: SDUPTHER

## 2019-01-22 ENCOUNTER — OFFICE VISIT (OUTPATIENT)
Dept: CARDIOLOGY | Facility: CLINIC | Age: 79
End: 2019-01-22

## 2019-01-22 VITALS
HEART RATE: 92 BPM | HEIGHT: 61 IN | WEIGHT: 130 LBS | SYSTOLIC BLOOD PRESSURE: 112 MMHG | BODY MASS INDEX: 24.55 KG/M2 | DIASTOLIC BLOOD PRESSURE: 54 MMHG

## 2019-01-22 DIAGNOSIS — I47.1 SVT (SUPRAVENTRICULAR TACHYCARDIA) (HCC): Primary | ICD-10-CM

## 2019-01-22 DIAGNOSIS — I10 ESSENTIAL HYPERTENSION: ICD-10-CM

## 2019-01-22 DIAGNOSIS — I35.0 AORTIC VALVE STENOSIS, ETIOLOGY OF CARDIAC VALVE DISEASE UNSPECIFIED: ICD-10-CM

## 2019-01-22 DIAGNOSIS — I42.8 NICM (NONISCHEMIC CARDIOMYOPATHY) (HCC): ICD-10-CM

## 2019-01-22 DIAGNOSIS — E03.9 HYPOTHYROIDISM, UNSPECIFIED TYPE: ICD-10-CM

## 2019-01-22 DIAGNOSIS — E78.00 HYPERCHOLESTEREMIA: ICD-10-CM

## 2019-01-22 DIAGNOSIS — I48.3 TYPICAL ATRIAL FLUTTER (HCC): ICD-10-CM

## 2019-01-22 DIAGNOSIS — I49.5 SSS (SICK SINUS SYNDROME) (HCC): ICD-10-CM

## 2019-01-22 DIAGNOSIS — I48.0 PAROXYSMAL ATRIAL FIBRILLATION (HCC): ICD-10-CM

## 2019-01-22 PROCEDURE — 99213 OFFICE O/P EST LOW 20 MIN: CPT | Performed by: INTERNAL MEDICINE

## 2019-01-22 PROCEDURE — 93000 ELECTROCARDIOGRAM COMPLETE: CPT | Performed by: INTERNAL MEDICINE

## 2019-01-22 RX ORDER — LISINOPRIL 5 MG/1
5 TABLET ORAL DAILY
Qty: 30 TABLET | Refills: 11 | Status: SHIPPED | OUTPATIENT
Start: 2019-01-22 | End: 2020-01-20 | Stop reason: SDUPTHER

## 2019-01-22 RX ORDER — RISPERIDONE 4 MG/1
3 TABLET ORAL NIGHTLY
COMMUNITY

## 2019-01-22 RX ORDER — PRAVASTATIN SODIUM 40 MG
40 TABLET ORAL DAILY
Qty: 30 TABLET | Refills: 8 | Status: SHIPPED | OUTPATIENT
Start: 2019-01-22 | End: 2020-01-20 | Stop reason: SDUPTHER

## 2019-01-22 RX ORDER — MAGNESIUM OXIDE 400 MG/1
400 TABLET ORAL DAILY
COMMUNITY
End: 2020-07-21 | Stop reason: ALTCHOICE

## 2019-01-22 RX ORDER — CARVEDILOL 6.25 MG/1
6.25 TABLET ORAL 2 TIMES DAILY
Qty: 60 TABLET | Refills: 11 | Status: SHIPPED | OUTPATIENT
Start: 2019-01-22 | End: 2020-01-20 | Stop reason: SDUPTHER

## 2019-01-22 RX ORDER — FUROSEMIDE 20 MG/1
20 TABLET ORAL DAILY
Qty: 30 TABLET | Refills: 8 | Status: SHIPPED | OUTPATIENT
Start: 2019-01-22 | End: 2020-01-20 | Stop reason: SDUPTHER

## 2019-01-22 NOTE — PROGRESS NOTES
Chief Complaint   Patient presents with   • Follow-up     for cardiac management, doing much better   • Med Refill     refill needed on cardiac meds. Asking for Xarelto 20 mg samples.    • Labs     checked last in the hospital   • Pneumonia     admitted to [facility] recently   • Arrythmia     ablation 9/7/18 Dr Chavira, saw him on 12/14/18       CARDIAC COMPLAINTS  dyspnea and fatigue        Subjective   Debbie Rodrigez is a 78 y.o. female came in today for her follow-up visit.  She has history of significant aortic valvular stenosis for which she underwent aortic valve replacement in 2017.  She also developed atrial fibrillation and was treated for the same.  Her left frontal of function dropped along with the left bundle branch block and had BIV PPM placed.  She continues to have arrhythmias in the form of flutter and was seen by Dr. Chavira who did AV ashley ablation along with right atrial isthmus ablation.  She is now followed by Dr. Chavira regarding the pacemaker.  He did change her Lopressor to Coreg.  She came today with no new symptoms other than feeling little weak.  She was in the hospital with pneumonia recently.  Her lab work is followed at your office.                 Cardiac History  Past Surgical History:   Procedure Laterality Date   • AORTIC VALVE REPAIR/REPLACEMENT N/A 11/10/2017    Procedure: AORTIC VALVE REPAIR/REPLACEMENT WITH MARYSE;  Surgeon: Dmitry Darling MD;  Location: [facility] OR;  Service:    • CARDIAC CATHETERIZATION  09/11/2002    Cath- EF 30% Normal Coronaries   • CARDIAC CATHETERIZATION  10/20/2017    Normal Coronaries. Sig AI. Unable to cross AV   • CARDIAC ELECTROPHYSIOLOGY PROCEDURE N/A 9/7/2018    Procedure: Ablation SVT;  Surgeon: Benito Chavira MD;  Location: [facility] EP INVASIVE LOCATION;  Service: Cardiovascular   • CARDIOVASCULAR STRESS TEST  05/22/2003    P. Myoview- Normal. Pt had CP   • CARDIOVASCULAR STRESS TEST  03/12/2013    L. Myoview- Pt had CP. Negative   • CONVERTED  (HISTORICAL) CARDIOVASCULAR STUDIES  01/22/2014    MUGA- EF 45%   • CONVERTED (HISTORICAL) HOLTER  10/10/2002    Holter- AVG HR 70 BPM   • ECHO - CONVERTED  09/15/2003    Echo- ([facility]) EF 55%. LBBB. RVSP- 42 mmHg   • ECHO - CONVERTED  03/12/2013    Echo- EF 60%. BRIE- 1.6 mmHg. RVSP- 42 mmHg   • ECHO - CONVERTED  10/15/2013    Echo-EF 35%, mod. MR, & AI. BRIE- 1.4 Cm2. RVSP- 40 mmHg   • ECHO - CONVERTED  06/09/2015    Echo- EF 45-50%, mod MR, Mod AS, BRIE 1.1 Cm2, Mod AR, RVSP- 35 mmHg   • ECHO - CONVERTED  02/21/2017    EF 55-60%, BRIE 1.0   • ECHO - CONVERTED  12/15/2017    @[facility]. EF 35-40%. LBBB. Mild- Mod MR   • ECHO - CONVERTED  05/01/2018    EF 45-50%. Mod MR. RVSP- 35 mmHg   • PACEMAKER IMPLANTATION  12/19/2017    [place] BIV PPM by Dr. Dickey   • MARYSE  09/06/2013    MARYSE- < 35%, Moderate AS. AI and MR. No Thrombus       Current Outpatient Medications   Medication Sig Dispense Refill   • aspirin 81 MG EC tablet Take 81 mg by mouth daily.     • calcium carbonate (OS-PABLO) 600 MG tablet Take 600 mg by mouth Daily. CALCIUM 600 MG AND VITAMIN D3 800 IU     • carvedilol (COREG) 6.25 MG tablet Take 1 tablet by mouth 2 (Two) Times a Day. 60 tablet 11   • docusate sodium (COLACE) 100 MG capsule Take 100 mg by mouth 2 (Two) Times a Day As Needed.     • ferrous sulfate 325 (65 FE) MG tablet Take 1 tablet by mouth 3 (Three) Times a Day With Meals. (Patient taking differently: Take 325 mg by mouth 2 (Two) Times a Day.) 90 tablet 11   • furosemide (LASIX) 20 MG tablet Take 1 tablet by mouth Daily. 30 tablet 8   • ipratropium (ATROVENT) 0.03 % nasal spray 4 sprays into each nostril 2 (Two) Times a Day As Needed for Rhinitis.     • levocetirizine (XYZAL) 5 MG tablet Take 5 mg by mouth Every Evening.     • levothyroxine (SYNTHROID, LEVOTHROID) 88 MCG tablet Take 88 mcg by mouth Daily.     • lisinopril (PRINIVIL,ZESTRIL) 5 MG tablet Take 1 tablet by mouth Daily. 30 tablet 11   • magnesium oxide (MAG-OX) 400 MG tablet Take 400 mg by  mouth Daily.     • Multiple Vitamin (MULTI VITAMIN DAILY PO) Take 1 tablet by mouth Daily.     • Omega 3 1000 MG capsule Take 1 tablet by mouth 2 (Two) Times a Day. FISH OIL 1000 MG  MG OMEGA -3     • potassium chloride (K-DUR) 10 MEQ CR tablet Take 10 mEq by mouth Daily.     • pravastatin (PRAVACHOL) 40 MG tablet Take 1 tablet by mouth Daily. 30 tablet 8   • risperiDONE (RISPERDAL) 4 MG tablet Take 4 mg by mouth Daily.     • rivaroxaban (XARELTO) 20 MG tablet Take 1 tablet by mouth Daily. 14 tablet 0   • vitamin C (ASCORBIC ACID) 500 MG tablet Take 500 mg by mouth Daily.     • acetaminophen (TYLENOL) 325 MG tablet Take 650 mg by mouth Every 6 (Six) Hours As Needed for Mild Pain .       No current facility-administered medications for this visit.        Allergies  :  Codeine; Penicillins; and Sulfa antibiotics       Past Medical History:   Diagnosis Date   • Aortic valve stenosis    • Bipolar affective disorder (CMS/[facility])    • CHF (congestive heart failure) (CMS/[facility]ly [facility])    • Diabetes mellitus (CMS/[facility]ly [facility])     DX'D TYPE II NIDDM APPROX 3-4 YEARS AGO, NO MEDS, CHECKS BLOOD SUGAR 2-3 TIMES PER WEEK, AVERAGE    • CLAYTON (generalized anxiety disorder)    • H/O dilation and curettage    • H/O eye surgery     Eye surgery- bilateral   • H/O:     • H/O: hysterectomy     s/p appendectomy   • Hallucinations    • History of AAA (abdominal aortic aneurysm) repair     (pt doesn't recall- on PCP note)   • Hyperlipidemia    • Hypertension    • Hypothyroidism    • Overactive bladder    • Stroke (CMS/[facility])     TIA 3 YEARS AGO, MEMORY LOSS    • SVT (supraventricular tachycardia) (CMS/[facility])    • Tachycardia    • Wears dentures     PARTIALS ON TOP AND BOTTOM    • Wears glasses        Social History     Socioeconomic History   • Marital status:      Spouse name: Not on file   • Number of children: 1   • Years of education: Not on file   • Highest education level: Not on file   Social Needs   • Financial resource strain:  "Not on file   • Food insecurity - worry: Not on file   • Food insecurity - inability: Not on file   • Transportation needs - medical: Not on file   • Transportation needs - non-medical: Not on file   Occupational History   • Occupation: RETIRED    Tobacco Use   • Smoking status: Never Smoker   • Smokeless tobacco: Never Used   Substance and Sexual Activity   • Alcohol use: No   • Drug use: No   • Sexual activity: Defer   Other Topics Concern   • Not on file   Social History Narrative    Lives in [place], [place] with .       Family History   Problem Relation Age of Onset   • Heart disease Mother    • Diabetes Mother    • Cancer Father         Bone       Review of Systems   Constitution: Positive for weakness and malaise/fatigue. Negative for decreased appetite.   HENT: Negative for congestion and sore throat.    Eyes: Negative for blurred vision.   Cardiovascular: Positive for dyspnea on exertion. Negative for chest pain and palpitations.   Respiratory: Positive for shortness of breath. Negative for snoring.    Endocrine: Negative for cold intolerance and heat intolerance.   Hematologic/Lymphatic: Negative for adenopathy. Does not bruise/bleed easily.   Skin: Negative for itching, nail changes and skin cancer.   Musculoskeletal: Positive for arthritis. Negative for myalgias.   Gastrointestinal: Negative for abdominal pain, dysphagia and heartburn.   Genitourinary: Negative for bladder incontinence and frequency.   Neurological: Negative for dizziness, light-headedness, seizures and vertigo.   Psychiatric/Behavioral: Negative for altered mental status.   Allergic/Immunologic: Negative for environmental allergies and hives.       Diabetes- No  Thyroid- abnormal    Objective     /54   Pulse 92   Ht 154.9 cm (61\")   Wt 59 kg (130 lb)   BMI 24.56 kg/m²     Physical Exam   Constitutional: She is oriented to person, place, and time. She appears well-developed and well-nourished.   HENT:   Head: " Normocephalic.   Nose: Nose normal.   Eyes: EOM are normal. Pupils are equal, round, and reactive to light.   Neck: Normal range of motion. Neck supple.   Cardiovascular: Normal rate, regular rhythm, S1 normal and S2 normal.   Murmur heard.  Pulmonary/Chest: Effort normal and breath sounds normal.   Abdominal: Soft. Bowel sounds are normal.   Musculoskeletal: Normal range of motion. She exhibits no edema.   Neurological: She is alert and oriented to person, place, and time.   Skin: Skin is warm and dry.   Psychiatric: She has a normal mood and affect.       ECG 12 Lead  Date/Time: 1/22/2019 2:24 PM  Performed by: Alba Waller MD  Authorized by: Alba Waller MD   Comparison: compared with previous ECG from 7/23/2018  Rhythm: paced  Rate: normal  QRS axis: normal  Clinical impression: abnormal ECG                    Assessment/Plan   Patient's Body mass index is 24.56 kg/m². BMI is within normal parameters. No follow-up required..     Debbie was seen today for follow-up, med refill, labs, pneumonia and arrythmia.    Diagnoses and all orders for this visit:    Aortic valve stenosis, etiology of cardiac valve disease unspecified    SVT (supraventricular tachycardia) (CMS/HCC)    Paroxysmal atrial fibrillation (CMS/HCC)    SSS (sick sinus syndrome) (CMS/HCC)    NICM (nonischemic cardiomyopathy) (CMS/HCC)  -     carvedilol (COREG) 6.25 MG tablet; Take 1 tablet by mouth 2 (Two) Times a Day.  -     furosemide (LASIX) 20 MG tablet; Take 1 tablet by mouth Daily.    Typical atrial flutter (CMS/HCC)    Essential hypertension  -     carvedilol (COREG) 6.25 MG tablet; Take 1 tablet by mouth 2 (Two) Times a Day.  -     lisinopril (PRINIVIL,ZESTRIL) 5 MG tablet; Take 1 tablet by mouth Daily.    Hypothyroidism, unspecified type    Hypercholesteremia  -     pravastatin (PRAVACHOL) 40 MG tablet; Take 1 tablet by mouth Daily.       At baseline, her heart rate and blood pressure appears stable.  Her BMI is less than 25.   Her EKG showed sinus rhythm with ventricular pacing.  At this time continue the same medication.  I told her not to take both Coreg and Lopressor.  She did bring me copy of her blood pressure readings.  It appears to be within normal limit.  Gave her samples of Xarelto.  We'll see her back in 6 months or sooner if needed.                  Electronically signed by Alba Waller MD January 22, 2019 2:17 PM

## 2019-01-25 ENCOUNTER — TELEPHONE (OUTPATIENT)
Dept: CARDIOLOGY | Facility: CLINIC | Age: 79
End: 2019-01-25

## 2019-02-05 RX ORDER — FUROSEMIDE 20 MG/1
TABLET ORAL
Qty: 30 TABLET | Refills: 6 | OUTPATIENT
Start: 2019-02-05

## 2019-04-23 ENCOUNTER — TELEPHONE (OUTPATIENT)
Dept: CARDIOLOGY | Facility: CLINIC | Age: 79
End: 2019-04-23

## 2019-04-23 NOTE — TELEPHONE ENCOUNTER
Pt's  called asking for samples of Xarelto 20 mg daily. LM that samples are ready to be picked up.

## 2019-07-12 ENCOUNTER — OFFICE VISIT (OUTPATIENT)
Dept: CARDIOLOGY | Facility: CLINIC | Age: 79
End: 2019-07-12

## 2019-07-12 VITALS
HEART RATE: 87 BPM | BODY MASS INDEX: 23.98 KG/M2 | HEIGHT: 61 IN | WEIGHT: 127 LBS | SYSTOLIC BLOOD PRESSURE: 115 MMHG | DIASTOLIC BLOOD PRESSURE: 60 MMHG

## 2019-07-12 DIAGNOSIS — I48.0 PAROXYSMAL ATRIAL FIBRILLATION (HCC): Primary | ICD-10-CM

## 2019-07-12 DIAGNOSIS — I42.8 NICM (NONISCHEMIC CARDIOMYOPATHY) (HCC): ICD-10-CM

## 2019-07-12 DIAGNOSIS — I49.5 SSS (SICK SINUS SYNDROME) (HCC): ICD-10-CM

## 2019-07-12 PROCEDURE — 93281 PM DEVICE PROGR EVAL MULTI: CPT | Performed by: NURSE PRACTITIONER

## 2019-07-12 PROCEDURE — 99213 OFFICE O/P EST LOW 20 MIN: CPT | Performed by: NURSE PRACTITIONER

## 2019-07-12 RX ORDER — ALPRAZOLAM 0.25 MG/1
0.25 TABLET ORAL DAILY
COMMUNITY
End: 2020-01-20 | Stop reason: ALTCHOICE

## 2019-07-12 NOTE — PROGRESS NOTES
Debbie B Lay  1940  [phone]  [facility] CARDIOLOGY     Reagan Bhatti MD  [address] [facility] [address]  [place] [id number]    Chief Complaint   Patient presents with   • Cardiomyopathy       Problem List:   1.  NICM:              A.  Left heart catheterization 9/11/2002: EF 30%, normal coronary arteries              B.  Myoview stress test 5/22/2003: Normal stress test, data deficit              C.  Echocardiogram 9/15/2003: EF 55%, RVSP 42 mmHg, data deficit              D. Echocardiogram 3/12/2013 EF 60%, RVSP 42 mmHg              E. Myoview stress test 3/12/2013 negative for ischemia              F. Echocardiogram 10/15/2013: EF 35%, moderate MR, moderate AI, RVSP 40 mmHg              G. Transesophageal echocardiogram 9/6/2013 EF less than 35%, moderate AF, AI, and MR              H. MUGA scan 1/22/2014: EF 62%              I.  Echocardiogram 6/9/2015: EF 45-50%, moderate MR, moderate AF, moderate AI, RVSP 35 mmHg              J. Echocardiogram 2/21/2017: EF 55-60%              K. Echocardiogram 8/30/17: EF 40-45%, moderate to severe AS, moderate AI, mild MR, RVSP 38 mm Hg              L. Left heart catheterization 10/2017: Normal coronary arteries, significant AS and AI with AVR recommended              M. Echocardiogram 12/15/2017: EF 35-40%, left bundle branch block, mild to moderate MR              N. S/p BSC Bi-V PM with Dr. Dickey 12/19/17              O. Echocardiogram 5/1/18: EF 45-50%, moderate MR, RVSP 35 mmHg, mild LVH  2. SVT/Atrial Flutter               A. Noted on device interrogation 7/2018   B. Typical Atrial Flutter Ablation 9/7/18  3. Atrial fibrillation:              A. Developed postoperatively after tissue aortic valve replacement in November, initiated on flecainide and Xarelto              B. 7 day Holter monitor 5/7/2018:16.3% A. fib burden, average heart rate 90 bpm, minimum 51 bpm, maximum 202 bpm    C. AVN ablation 9/7/18  4. Valvular heart  disease:              A. Aortic stenosis s/p tissue AVR 11/10/17 with Dr. Darling, EF during intra-operative MARYSE showing EF 60%  5. Tachybrady syndrome:              A. S/p BSC BI-V PM with Dr. Dickey 17  6. Hypertension  7. Hyperlipidemia  8. Hypothyroidism  9. Diabetes mellitus, type II  10. Bipolar disorder  11. TIA  12. Generalized anxiety disorder  13. Surgical history:              A. D&C              B.  section              C. Hysterectomy              D. Appendectomy              E. Eye surgery     Allergies  Allergies   Allergen Reactions   • Codeine GI Intolerance   • Penicillins Rash   • Sulfa Antibiotics Rash       Current Medications    Current Outpatient Medications:   •  ALPRAZolam (XANAX) 0.25 MG tablet, Take 0.25 mg by mouth Daily., Disp: , Rfl:   •  aspirin 81 MG EC tablet, Take 81 mg by mouth daily., Disp: , Rfl:   •  calcium carbonate (OS-PABLO) 600 MG tablet, Take 600 mg by mouth Daily. CALCIUM 600 MG AND VITAMIN D3 800 IU, Disp: , Rfl:   •  carvedilol (COREG) 6.25 MG tablet, Take 1 tablet by mouth 2 (Two) Times a Day., Disp: 60 tablet, Rfl: 11  •  docusate sodium (COLACE) 100 MG capsule, Take 100 mg by mouth 2 (Two) Times a Day As Needed., Disp: , Rfl:   •  ferrous sulfate 325 (65 FE) MG tablet, Take 1 tablet by mouth 3 (Three) Times a Day With Meals. (Patient taking differently: Take 325 mg by mouth 2 (Two) Times a Day.), Disp: 90 tablet, Rfl: 11  •  furosemide (LASIX) 20 MG tablet, Take 1 tablet by mouth Daily. (Patient taking differently: Take 40 mg by mouth Daily.), Disp: 30 tablet, Rfl: 8  •  ipratropium (ATROVENT) 0.03 % nasal spray, 4 sprays into each nostril 2 (Two) Times a Day As Needed for Rhinitis., Disp: , Rfl:   •  levothyroxine (SYNTHROID, LEVOTHROID) 88 MCG tablet, Take 88 mcg by mouth Daily., Disp: , Rfl:   •  lisinopril (PRINIVIL,ZESTRIL) 5 MG tablet, Take 1 tablet by mouth Daily., Disp: 30 tablet, Rfl: 11  •  magnesium oxide (MAG-OX) 400 MG tablet, Take 400 mg by  "mouth Daily., Disp: , Rfl:   •  Multiple Vitamin (MULTI VITAMIN DAILY PO), Take 1 tablet by mouth Daily., Disp: , Rfl:   •  Omega 3 1000 MG capsule, Take 1 tablet by mouth 2 (Two) Times a Day. FISH OIL 1000 MG  MG OMEGA -3, Disp: , Rfl:   •  potassium chloride (K-DUR) 10 MEQ CR tablet, Take 10 mEq by mouth Daily., Disp: , Rfl:   •  pravastatin (PRAVACHOL) 40 MG tablet, Take 1 tablet by mouth Daily., Disp: 30 tablet, Rfl: 8  •  risperiDONE (RISPERDAL) 4 MG tablet, Take 4 mg by mouth Daily., Disp: , Rfl:   •  rivaroxaban (XARELTO) 20 MG tablet, Take 1 tablet by mouth Daily., Disp: 14 tablet, Rfl: 0  •  vitamin C (ASCORBIC ACID) 500 MG tablet, Take 500 mg by mouth Daily., Disp: , Rfl:   •  acetaminophen (TYLENOL) 325 MG tablet, Take 650 mg by mouth Every 6 (Six) Hours As Needed for Mild Pain ., Disp: , Rfl:   •  levocetirizine (XYZAL) 5 MG tablet, Take 5 mg by mouth Every Evening., Disp: , Rfl:     History of Present Illness     HPI: Pt presents for follow up of atrial flutter, atrial fibrillation s/p AVN and BIV PM check. Since her ablation, she has done well overall. She denies any palpiatations. She denies SOB, syncope, near syncope or LH. No edema. She is on Xarelto with no major bleeding/bruising issues. Her BPs at home are around 110-130 systolic and 60-70's diastolic.      ROS:  General:  + fatigue, - weight gain or loss  Cardiovascular:  + atypical CP, - PND, syncope, near syncope, edema or palpitations.  Pulmonary:  Denies CUNHA, +cough, - wheezing      Vitals:    07/12/19 1412   BP: 115/60   BP Location: Left arm   Patient Position: Sitting   Pulse: 87   Weight: 57.6 kg (127 lb)   Height: 154.9 cm (61\")     Body mass index is 24 kg/m².  PE:  General: NAD  Neck: no JVD, no carotid bruits, no TM  Heart RRR, NL S1, S2, S4 present, no rubs, murmurs  Lungs: CTA, no wheezes, rhonchi, or rales  Abd: soft, non-tender, NL BS  Ext: No musculoskeletal deformities, no edema, cyanosis, or clubbing  Psych: normal mood " and affect    Diagnostic Data:    BiV ICD check RA 15%, %, normal threshold and impedance, battery life 8.5-9 years, <1% AMS.     Procedures    1. Paroxysmal atrial fibrillation (CMS/HCC)    2. SSS (sick sinus syndrome) (CMS/HCC)    3. NICM (nonischemic cardiomyopathy) (CMS/[facility])      Plan:  1. NICM:  - EF 45-50%  - on ACE and Coreg 6.25 mg BID.     2. Typical Atrial Flutter:  - s/p RFA 9/2018  - on Xarelto     3. PAF:  - 1% mode switched, appears to be flutter   - s/p AVN ablation with normal device function today.     F/up in 6 months    Electronically signed by TAYO Severino, 07/12/19, 2:24 PM.

## 2019-07-22 ENCOUNTER — OFFICE VISIT (OUTPATIENT)
Dept: CARDIOLOGY | Facility: CLINIC | Age: 79
End: 2019-07-22

## 2019-07-22 ENCOUNTER — HOSPITAL ENCOUNTER (OUTPATIENT)
Dept: CARDIOLOGY | Facility: HOSPITAL | Age: 79
Discharge: HOME | End: 2019-07-22
Admitting: NURSE PRACTITIONER

## 2019-07-22 VITALS
HEART RATE: 75 BPM | SYSTOLIC BLOOD PRESSURE: 124 MMHG | BODY MASS INDEX: 23.98 KG/M2 | WEIGHT: 127 LBS | HEIGHT: 61 IN | DIASTOLIC BLOOD PRESSURE: 62 MMHG

## 2019-07-22 DIAGNOSIS — I51.9 SYSTOLIC DYSFUNCTION: Primary | ICD-10-CM

## 2019-07-22 DIAGNOSIS — I51.9 SYSTOLIC DYSFUNCTION: ICD-10-CM

## 2019-07-22 DIAGNOSIS — I42.8 NICM (NONISCHEMIC CARDIOMYOPATHY) (HCC): ICD-10-CM

## 2019-07-22 DIAGNOSIS — Z95.2 S/P AVR: ICD-10-CM

## 2019-07-22 DIAGNOSIS — Z79.01 CURRENT USE OF LONG TERM ANTICOAGULATION: ICD-10-CM

## 2019-07-22 DIAGNOSIS — R01.1 CARDIAC MURMUR: ICD-10-CM

## 2019-07-22 DIAGNOSIS — E78.00 HYPERCHOLESTEREMIA: ICD-10-CM

## 2019-07-22 DIAGNOSIS — I10 ESSENTIAL HYPERTENSION: ICD-10-CM

## 2019-07-22 DIAGNOSIS — I35.0 AORTIC VALVE STENOSIS, ETIOLOGY OF CARDIAC VALVE DISEASE UNSPECIFIED: ICD-10-CM

## 2019-07-22 DIAGNOSIS — I49.5 SSS (SICK SINUS SYNDROME) (HCC): ICD-10-CM

## 2019-07-22 DIAGNOSIS — I48.0 PAROXYSMAL ATRIAL FIBRILLATION (HCC): ICD-10-CM

## 2019-07-22 LAB
BH CV ECHO MEAS - ACS: 1.5 CM
BH CV ECHO MEAS - AO MEAN PG (FULL): 7.2 MMHG
BH CV ECHO MEAS - AO MEAN PG: 8.4 MMHG
BH CV ECHO MEAS - AO ROOT AREA (BSA CORRECTED): 2.1
BH CV ECHO MEAS - AO ROOT AREA: 8.5 CM^2
BH CV ECHO MEAS - AO ROOT DIAM: 3.3 CM
BH CV ECHO MEAS - AO V2 MEAN: 137.3 CM/SEC
BH CV ECHO MEAS - AO V2 VTI: 36.5 CM
BH CV ECHO MEAS - AVA(I,A): 1.4 CM^2
BH CV ECHO MEAS - AVA(I,D): 1.4 CM^2
BH CV ECHO MEAS - BSA(HAYCOCK): 1.6 M^2
BH CV ECHO MEAS - BSA: 1.6 M^2
BH CV ECHO MEAS - BZI_BMI: 24 KILOGRAMS/M^2
BH CV ECHO MEAS - BZI_METRIC_HEIGHT: 154.9 CM
BH CV ECHO MEAS - BZI_METRIC_WEIGHT: 57.6 KG
BH CV ECHO MEAS - EDV(CUBED): 61.4 ML
BH CV ECHO MEAS - EDV(MOD-SP4): 39 ML
BH CV ECHO MEAS - EDV(TEICH): 67.7 ML
BH CV ECHO MEAS - EF(CUBED): 52.2 %
BH CV ECHO MEAS - EF(MOD-SP4): 33.3 %
BH CV ECHO MEAS - EF(TEICH): 44.7 %
BH CV ECHO MEAS - ESV(CUBED): 29.3 ML
BH CV ECHO MEAS - ESV(MOD-SP4): 26 ML
BH CV ECHO MEAS - ESV(TEICH): 37.4 ML
BH CV ECHO MEAS - FS: 21.8 %
BH CV ECHO MEAS - IVS/LVPW: 1.1
BH CV ECHO MEAS - IVSD: 1.1 CM
BH CV ECHO MEAS - LA DIMENSION: 3 CM
BH CV ECHO MEAS - LA/AO: 0.93
BH CV ECHO MEAS - LV DIASTOLIC VOL/BSA (35-75): 25 ML/M^2
BH CV ECHO MEAS - LV IVRT: 0.12 SEC
BH CV ECHO MEAS - LV MASS(C)D: 133.3 GRAMS
BH CV ECHO MEAS - LV MASS(C)DI: 85.6 GRAMS/M^2
BH CV ECHO MEAS - LV MEAN PG: 1.2 MMHG
BH CV ECHO MEAS - LV SYSTOLIC VOL/BSA (12-30): 16.7 ML/M^2
BH CV ECHO MEAS - LV V1 MEAN: 51.7 CM/SEC
BH CV ECHO MEAS - LV V1 VTI: 14.7 CM
BH CV ECHO MEAS - LVIDD: 3.9 CM
BH CV ECHO MEAS - LVIDS: 3.1 CM
BH CV ECHO MEAS - LVLD AP4: 5.8 CM
BH CV ECHO MEAS - LVLS AP4: 5.6 CM
BH CV ECHO MEAS - LVOT AREA (M): 3.5 CM^2
BH CV ECHO MEAS - LVOT AREA: 3.5 CM^2
BH CV ECHO MEAS - LVOT DIAM: 2.1 CM
BH CV ECHO MEAS - LVPWD: 0.99 CM
BH CV ECHO MEAS - MV A MAX VEL: 78.5 CM/SEC
BH CV ECHO MEAS - MV DEC SLOPE: 294.3 CM/SEC^2
BH CV ECHO MEAS - MV E MAX VEL: 69.1 CM/SEC
BH CV ECHO MEAS - MV E/A: 0.88
BH CV ECHO MEAS - RAP SYSTOLE: 10 MMHG
BH CV ECHO MEAS - RVDD: 2.1 CM
BH CV ECHO MEAS - RVSP: 28.9 MMHG
BH CV ECHO MEAS - SI(AO): 198.2 ML/M^2
BH CV ECHO MEAS - SI(CUBED): 20.6 ML/M^2
BH CV ECHO MEAS - SI(LVOT): 33.2 ML/M^2
BH CV ECHO MEAS - SI(MOD-SP4): 8.3 ML/M^2
BH CV ECHO MEAS - SI(TEICH): 19.5 ML/M^2
BH CV ECHO MEAS - SV(AO): 308.7 ML
BH CV ECHO MEAS - SV(CUBED): 32.1 ML
BH CV ECHO MEAS - SV(LVOT): 51.7 ML
BH CV ECHO MEAS - SV(MOD-SP4): 13 ML
BH CV ECHO MEAS - SV(TEICH): 30.3 ML
BH CV ECHO MEAS - TR MAX VEL: 217.2 CM/SEC
MAXIMAL PREDICTED HEART RATE: 143 BPM
STRESS TARGET HR: 122 BPM

## 2019-07-22 PROCEDURE — 93000 ELECTROCARDIOGRAM COMPLETE: CPT | Performed by: NURSE PRACTITIONER

## 2019-07-22 PROCEDURE — 99214 OFFICE O/P EST MOD 30 MIN: CPT | Performed by: NURSE PRACTITIONER

## 2019-07-22 PROCEDURE — 93306 TTE W/DOPPLER COMPLETE: CPT | Performed by: INTERNAL MEDICINE

## 2019-07-22 PROCEDURE — 93306 TTE W/DOPPLER COMPLETE: CPT

## 2019-07-22 NOTE — PROGRESS NOTES
"Chief Complaint   Patient presents with   • Follow-up     Cardiac management . Takes Aspirin 81 mg daily. Has no cardiac complaints today .   • Edema     Bilat feet   • Med Refill     Had medication list with her today. No refills needed today.       Bri Rodrigez is a 78 y.o. female history of significant aortic valvular stenosis for which she underwent aortic valve replacement in 2017.  She also developed atrial fibrillation and was treated for the same.  Her left frontal of function dropped along with the left bundle branch block and had BIV PPM placed.  She continues to have arrhythmias in the form of flutter and was seen by Dr. Chavira who did AV ashley ablation along with right atrial isthmus ablation.  She is now followed by Dr. Chavira regarding the pacemaker.  He did change her Lopressor to Coreg. Her pacemaker interrogation done at July office visit with Dr. Chavira showed normal function. AMS < 1% appeared to be atrial flutter. No change in plan of care noted.     Today she comes to the office for a follow up visit. Her main complaint is fatigue. She states, \" I feel good some day and some days I don't \". No chest pain, palpitations or dizziness noted. With exertion she has shortness of breath. No recent cardiac medication changes noted.     HPI     Cardiac History:    Past Surgical History:   Procedure Laterality Date   • AORTIC VALVE REPAIR/REPLACEMENT N/A 11/10/2017    Procedure: AORTIC VALVE REPAIR/REPLACEMENT WITH MARYSE;  Surgeon: Dmitry Darling MD;  Location: [facility] OR;  Service:    • CARDIAC CATHETERIZATION  09/11/2002    Cath- EF 30% Normal Coronaries   • CARDIAC CATHETERIZATION  10/20/2017    Normal Coronaries. Sig AI. Unable to cross AV   • CARDIAC ELECTROPHYSIOLOGY PROCEDURE N/A 9/7/2018    Procedure: Ablation SVT;  Surgeon: Benito Chavira MD;  Location: [facility] EP INVASIVE LOCATION;  Service: Cardiovascular   • CARDIOVASCULAR STRESS TEST  05/22/2003    P. Myoview- " Normal. Pt had CP   • CARDIOVASCULAR STRESS TEST  03/12/2013    [facility] [place]- Pt had CP. Negative   • CONVERTED (HISTORICAL) CARDIOVASCULAR STUDIES  01/22/2014    MUGA- EF 45%   • CONVERTED (HISTORICAL) HOLTER  10/10/2002    Holter- AVG HR 70 BPM   • ECHO - CONVERTED  09/15/2003    Echo- ([facility]) EF 55%. LBBB. RVSP- 42 mmHg   • ECHO - CONVERTED  03/12/2013    Echo- EF 60%. BRIE- 1.6 mmHg. RVSP- 42 mmHg   • ECHO - CONVERTED  10/15/2013    Echo-EF 35%, mod. MR, & AI. BRIE- 1.4 Cm2. RVSP- 40 mmHg   • ECHO - CONVERTED  06/09/2015    Echo- EF 45-50%, mod MR, Mod AS, BRIE 1.1 Cm2, Mod AR, RVSP- 35 mmHg   • ECHO - CONVERTED  02/21/2017    EF 55-60%, BRIE 1.0   • ECHO - CONVERTED  12/15/2017    @[facility]. EF 35-40%. LBBB. Mild- Mod MR   • ECHO - CONVERTED  05/01/2018    EF 45-50%. Mod MR. RVSP- 35 mmHg   • PACEMAKER IMPLANTATION  12/19/2017    [place] BIV PPM by Dr. Dickey   • MARYSE  09/06/2013    MARYSE- < 35%, Moderate AS. AI and MR. No Thrombus       Current Outpatient Medications   Medication Sig Dispense Refill   • acetaminophen (TYLENOL) 325 MG tablet Take 650 mg by mouth Every 6 (Six) Hours As Needed for Mild Pain .     • ALPRAZolam (XANAX) 0.25 MG tablet Take 0.25 mg by mouth Daily.     • aspirin 81 MG EC tablet Take 81 mg by mouth daily.     • calcium carbonate (OS-PABLO) 600 MG tablet Take 600 mg by mouth Daily. CALCIUM 600 MG AND VITAMIN D3 800 IU     • carvedilol (COREG) 6.25 MG tablet Take 1 tablet by mouth 2 (Two) Times a Day. 60 tablet 11   • docusate sodium (COLACE) 100 MG capsule Take 100 mg by mouth 2 (Two) Times a Day As Needed.     • ferrous sulfate 325 (65 FE) MG tablet Take 1 tablet by mouth 3 (Three) Times a Day With Meals. (Patient taking differently: Take 325 mg by mouth 2 (Two) Times a Day.) 90 tablet 11   • furosemide (LASIX) 20 MG tablet Take 1 tablet by mouth Daily. (Patient taking differently: Take 40 mg by mouth Daily.) 30 tablet 8   • ipratropium (ATROVENT) 0.03 % nasal spray 4 sprays into each nostril 2 (Two)  Times a Day As Needed for Rhinitis.     • levocetirizine (XYZAL) 5 MG tablet Take 5 mg by mouth Every Evening.     • levothyroxine (SYNTHROID, LEVOTHROID) 88 MCG tablet Take 88 mcg by mouth Daily.     • lisinopril (PRINIVIL,ZESTRIL) 5 MG tablet Take 1 tablet by mouth Daily. 30 tablet 11   • magnesium oxide (MAG-OX) 400 MG tablet Take 400 mg by mouth Daily.     • Multiple Vitamin (MULTI VITAMIN DAILY PO) Take 1 tablet by mouth Daily.     • Omega 3 1000 MG capsule Take 1 tablet by mouth 2 (Two) Times a Day. FISH OIL 1000 MG  MG OMEGA -3     • potassium chloride (K-DUR) 10 MEQ CR tablet Take 10 mEq by mouth Daily.     • pravastatin (PRAVACHOL) 40 MG tablet Take 1 tablet by mouth Daily. 30 tablet 8   • risperiDONE (RISPERDAL) 4 MG tablet Take 4 mg by mouth Daily.     • rivaroxaban (XARELTO) 20 MG tablet Take 1 tablet by mouth Daily. 14 tablet 0   • vitamin C (ASCORBIC ACID) 500 MG tablet Take 500 mg by mouth Daily.       No current facility-administered medications for this visit.        Codeine; Penicillins; and Sulfa antibiotics    Past Medical History:   Diagnosis Date   • Aortic valve stenosis    • Bipolar affective disorder (CMS/HCC)    • CHF (congestive heart failure) (CMS/HCC)    • Diabetes mellitus (CMS/HCC)     DX'D TYPE II NIDDM APPROX 3-4 YEARS AGO, NO MEDS, CHECKS BLOOD SUGAR 2-3 TIMES PER WEEK, AVERAGE    • CLAYTON (generalized anxiety disorder)    • H/O dilation and curettage    • H/O eye surgery     Eye surgery- bilateral   • H/O:     • H/O: hysterectomy     s/p appendectomy   • Hallucinations    • History of AAA (abdominal aortic aneurysm) repair     (pt doesn't recall- on PCP note)   • Hyperlipidemia    • Hypertension    • Hypothyroidism    • Overactive bladder    • Stroke (CMS/HCC)     TIA 3 YEARS AGO, MEMORY LOSS    • SVT (supraventricular tachycardia) (CMS/HCC)    • Tachycardia    • Wears dentures     PARTIALS ON TOP AND BOTTOM    • Wears glasses        Social History  "    Socioeconomic History   • Marital status:      Spouse name: Not on file   • Number of children: 1   • Years of education: Not on file   • Highest education level: Not on file   Occupational History   • Occupation: RETIRED    Tobacco Use   • Smoking status: Never Smoker   • Smokeless tobacco: Never Used   Substance and Sexual Activity   • Alcohol use: No   • Drug use: No   • Sexual activity: Defer   Social History Narrative    Lives in [place], [place] with .       Family History   Problem Relation Age of Onset   • Heart disease Mother    • Diabetes Mother    • Cancer Father         Bone       Review of Systems   Constitution: Positive for weakness, malaise/fatigue and weight loss (mild, intentional). Negative for decreased appetite.   HENT: Negative for congestion, hoarse voice and nosebleeds.    Eyes: Negative for redness and visual disturbance.   Cardiovascular: Positive for leg swelling (mild, ankles). Negative for chest pain, near-syncope and palpitations.   Respiratory: Positive for shortness of breath. Negative for cough and sleep disturbances due to breathing.    Endocrine: Negative for polydipsia, polyphagia and polyuria.   Hematologic/Lymphatic: Negative for bleeding problem. Does not bruise/bleed easily.   Skin: Negative for dry skin, flushing and itching.   Musculoskeletal: Negative for falls and muscle cramps.   Gastrointestinal: Negative for change in bowel habit, melena and nausea.   Genitourinary: Negative for dysuria and hematuria.   Neurological: Negative for dizziness, light-headedness, numbness and tremors.   Psychiatric/Behavioral: Positive for depression. Negative for memory loss. The patient is nervous/anxious.    Allergic/Immunologic: Negative for hives and persistent infections.        Objective     /62 (BP Location: Left arm)   Pulse 75   Ht 154.9 cm (61\")   Wt 57.6 kg (127 lb)   BMI 24.00 kg/m²     Physical Exam   Constitutional: She is oriented to " person, place, and time. She appears well-nourished.   HENT:   Head: Normocephalic.   Eyes: Conjunctivae are normal. Pupils are equal, round, and reactive to light.   Neck: Normal range of motion. Neck supple. Carotid bruit is not present.   Cardiovascular: Normal rate, regular rhythm, S1 normal and S2 normal.   No murmur heard.  Pulmonary/Chest: Breath sounds normal. She has no wheezes. She has no rales.   Abdominal: Soft. Bowel sounds are normal. She exhibits distension. There is no tenderness.   Musculoskeletal: Normal range of motion. She exhibits no tenderness. Edema: trace ankles.   Neurological: She is alert and oriented to person, place, and time.   Skin: Skin is warm and dry. No pallor.   Psychiatric: Her speech is normal and behavior is normal. Thought content normal. Her affect is blunt. Cognition and memory are normal.          ECG 12 Lead  Date/Time: 2019 11:01 AM  Performed by: Jazmin Alston APRN  Authorized by: Jazmin Alston APRN   Comparison: compared with previous ECG from 2019  Similar to previous ECG  Comparison to previous ECG: Sinus, ventricular paced  Rhythm: paced  BPM: 75  Pacin% capture              Assessment/Plan      Debbie was seen today for follow-up, edema and med refill.    Diagnoses and all orders for this visit:    Systolic dysfunction  -     Adult Transthoracic Echo Complete W/ Cont if Necessary Per Protocol; Future    NICM (nonischemic cardiomyopathy) (CMS/HCC)  -     Adult Transthoracic Echo Complete W/ Cont if Necessary Per Protocol; Future    Paroxysmal atrial fibrillation (CMS/HCC)  -     ECG 12 Lead    SSS (sick sinus syndrome) (CMS/HCC)  -     ECG 12 Lead    Aortic valve stenosis, etiology of cardiac valve disease unspecified    Cardiac murmur    S/P AVR  -     Adult Transthoracic Echo Complete W/ Cont if Necessary Per Protocol; Future    Essential hypertension    Hypercholesteremia    Current use of long term anticoagulation    EKG for management of  "PAF today shows sinus with ventricular pacing. CHADsVASc score 5, managed with Xarelto. No signs of bleeding noted. Continue same.     Her blood pressure is normal. Edema was very mild. Same dose Lisinopril, Coreg and Lasix advised.     Ms. Rodrigez is concerned over \"bad days\" and feeling fatigued. To re-look at aortic valve prothesis and LV function, an echocardiogram ordered. Further recommendations based on results.     Patient's Body mass index is 24 kg/m². BMI is within normal parameters. No follow-up required. for hypercholesterolemia she is taking Pravastatin without issues noted. She will follow with you for lab orders. Please forward a copy of lab results as available.      A 6 month follow up visit scheduled. Please call sooner for any cardiac concerns.            Electronically signed by TAYO Black,  July 22, 2019 5:07 PM  "

## 2019-11-18 ENCOUNTER — TELEPHONE (OUTPATIENT)
Dept: CARDIOLOGY | Facility: CLINIC | Age: 79
End: 2019-11-18

## 2019-11-18 NOTE — TELEPHONE ENCOUNTER
" called stating \"Debbie is requesting some samples of Xarelto 20mg daily. Samples ready for -7 tablets.  "

## 2019-12-18 ENCOUNTER — TELEPHONE (OUTPATIENT)
Dept: CARDIOLOGY | Facility: CLINIC | Age: 79
End: 2019-12-18

## 2019-12-18 NOTE — TELEPHONE ENCOUNTER
Do we have Eliquis samples? If not start Coumadin at 4 mg. Need PT checked first and rechecked in 3 days

## 2019-12-18 NOTE — TELEPHONE ENCOUNTER
Patients  Bipin came in to office asking for samples of Xarelto, which we had none to give. Asked if script needed he said that they could not afford. Not eligible for Xarelto patient assistance per .    Asking for it to be changed to something more affordable.    What do you suggest? Dose/ directions please

## 2019-12-20 NOTE — TELEPHONE ENCOUNTER
Patients  aware that Eliquis 5 mg samples (per Dr. Waller) is available for pick-up. Mr. Rodrigez said that they will wait until January to decide what to do.

## 2020-01-20 ENCOUNTER — OFFICE VISIT (OUTPATIENT)
Dept: CARDIOLOGY | Facility: CLINIC | Age: 80
End: 2020-01-20

## 2020-01-20 VITALS
HEART RATE: 76 BPM | SYSTOLIC BLOOD PRESSURE: 104 MMHG | HEIGHT: 61 IN | DIASTOLIC BLOOD PRESSURE: 64 MMHG | WEIGHT: 124 LBS | BODY MASS INDEX: 23.41 KG/M2

## 2020-01-20 DIAGNOSIS — I10 ESSENTIAL HYPERTENSION: ICD-10-CM

## 2020-01-20 DIAGNOSIS — Z95.2 S/P AVR: ICD-10-CM

## 2020-01-20 DIAGNOSIS — I35.0 AORTIC VALVE STENOSIS, ETIOLOGY OF CARDIAC VALVE DISEASE UNSPECIFIED: Primary | ICD-10-CM

## 2020-01-20 DIAGNOSIS — I47.1 SVT (SUPRAVENTRICULAR TACHYCARDIA) (HCC): ICD-10-CM

## 2020-01-20 DIAGNOSIS — I48.3 TYPICAL ATRIAL FLUTTER (HCC): ICD-10-CM

## 2020-01-20 DIAGNOSIS — I49.5 SSS (SICK SINUS SYNDROME) (HCC): ICD-10-CM

## 2020-01-20 DIAGNOSIS — E78.00 HYPERCHOLESTEREMIA: ICD-10-CM

## 2020-01-20 DIAGNOSIS — I42.8 NICM (NONISCHEMIC CARDIOMYOPATHY) (HCC): ICD-10-CM

## 2020-01-20 DIAGNOSIS — I48.0 PAROXYSMAL ATRIAL FIBRILLATION (HCC): ICD-10-CM

## 2020-01-20 DIAGNOSIS — Z95.0 PRESENCE OF BIVENTRICULAR CARDIAC PACEMAKER: ICD-10-CM

## 2020-01-20 DIAGNOSIS — I44.7 LBBB (LEFT BUNDLE BRANCH BLOCK): ICD-10-CM

## 2020-01-20 PROCEDURE — 93000 ELECTROCARDIOGRAM COMPLETE: CPT | Performed by: NURSE PRACTITIONER

## 2020-01-20 PROCEDURE — 99214 OFFICE O/P EST MOD 30 MIN: CPT | Performed by: NURSE PRACTITIONER

## 2020-01-20 RX ORDER — CARVEDILOL 6.25 MG/1
6.25 TABLET ORAL 2 TIMES DAILY
Qty: 180 TABLET | Refills: 3 | Status: SHIPPED | OUTPATIENT
Start: 2020-01-20 | End: 2021-01-18 | Stop reason: SDUPTHER

## 2020-01-20 RX ORDER — FUROSEMIDE 40 MG/1
40 TABLET ORAL DAILY
Qty: 90 TABLET | Refills: 3 | Status: SHIPPED | OUTPATIENT
Start: 2020-01-20 | End: 2021-01-18

## 2020-01-20 RX ORDER — FERROUS SULFATE TAB EC 324 MG (65 MG FE EQUIVALENT) 324 (65 FE) MG
324 TABLET DELAYED RESPONSE ORAL
COMMUNITY

## 2020-01-20 RX ORDER — POTASSIUM CHLORIDE 750 MG/1
10 TABLET, FILM COATED, EXTENDED RELEASE ORAL DAILY
Qty: 90 TABLET | Refills: 3 | Status: SHIPPED | OUTPATIENT
Start: 2020-01-20 | End: 2021-01-18 | Stop reason: SDUPTHER

## 2020-01-20 RX ORDER — PRAVASTATIN SODIUM 40 MG
40 TABLET ORAL DAILY
Qty: 90 TABLET | Refills: 3 | Status: SHIPPED | OUTPATIENT
Start: 2020-01-20 | End: 2021-01-18 | Stop reason: SDUPTHER

## 2020-01-20 RX ORDER — LISINOPRIL 5 MG/1
5 TABLET ORAL DAILY
Qty: 90 TABLET | Refills: 3 | Status: SHIPPED | OUTPATIENT
Start: 2020-01-20 | End: 2021-01-18 | Stop reason: SDUPTHER

## 2020-01-20 NOTE — PROGRESS NOTES
"Chief Complaint   Patient presents with   • Follow-up     Cardiac management.    • Lab     Last labs 9/2019 per PCP. She had abnormal cells in urine to have scope next month per Dr Martinez.   • Pacemaker Check     Last [employer] BIV ICD check 7/12/19.   • Dizziness     She states \"only if I get to hot\".   • Medications     Has questions concerning starting Coumadin.   • Med Refill     Needs refills on cardiac medication-90 day.       Bri Rodrigez is a 79 y.o. female with significant aortic valvular stenosis for which she underwent aortic valve replacement in 2017.  She also developed atrial fibrillation and was treated for the same.  Her LV function dropped along with the left bundle branch block and had BIV PPM placed.  She continued to have arrhythmias in the form of flutter and was seen by Dr. Chavira who did AV ashley ablation along with right atrial isthmus ablation.  She continues to follow with EP who follows her device.  In July 2019, pacer check showed normal function, AMS < 1% appeared to be atrial flutter. She has been anticoagulated with Xarelto. No change in plan of care noted. Echo repeated 7/2019 showed EF improved to 55%, BRIE 1.4 cm2, mild MR. Recently, we ran out of Xarelto samples and patient requested generic. Advised to change to Coumadin. She came today for follow up with her . She has no cardiac complaints, no CP, SOB, palpitations, dizziness. She has about 2 months left of samples and plans to change to warfarin if Xarelto not available. She has had recurrent UTI with plans to have cystoscope next month per Dr. Martinez.     HPI         Cardiac History:    Past Surgical History:   Procedure Laterality Date   • AORTIC VALVE REPAIR/REPLACEMENT N/A 11/10/2017    Procedure: AORTIC VALVE REPAIR/REPLACEMENT WITH MARYSE;  Surgeon: Dmitry Darling MD;  Location: [facility];  Service:    • CARDIAC CATHETERIZATION  09/11/2002    Cath- EF 30% Normal Coronaries   • CARDIAC " CATHETERIZATION  10/20/2017    Normal Coronaries. Sig AI. Unable to cross AV   • CARDIAC ELECTROPHYSIOLOGY PROCEDURE N/A 9/7/2018    Procedure: Ablation SVT;  Surgeon: Benito Chavira MD;  Location: [facility] INVASIVE LOCATION;  Service: Cardiovascular   • CARDIOVASCULAR STRESS TEST  05/22/2003    P. Myoview- Normal. Pt had CP   • CARDIOVASCULAR STRESS TEST  03/12/2013    L. Myoview- Pt had CP. Negative   • CONVERTED (HISTORICAL) CARDIOVASCULAR STUDIES  01/22/2014    MUGA- EF 45%   • CONVERTED (HISTORICAL) HOLTER  10/10/2002    Holter- AVG HR 70 BPM   • ECHO - CONVERTED  09/15/2003    Echo- ([facility]) EF 55%. LBBB. RVSP- 42 mmHg   • ECHO - CONVERTED  03/12/2013    Echo- EF 60%. BRIE- 1.6 mmHg. RVSP- 42 mmHg   • ECHO - CONVERTED  10/15/2013    Echo-EF 35%, mod. MR, & AI. BRIE- 1.4 Cm2. RVSP- 40 mmHg   • ECHO - CONVERTED  06/09/2015    Echo- EF 45-50%, mod MR, Mod AS, BRIE 1.1 Cm2, Mod AR, RVSP- 35 mmHg   • ECHO - CONVERTED  02/21/2017    EF 55-60%, BRIE 1.0   • ECHO - CONVERTED  12/15/2017    @[facility]. EF 35-40%. LBBB. Mild- Mod MR   • ECHO - CONVERTED  05/01/2018    EF 45-50%. Mod MR. RVSP- 35 mmHg   • ECHO - CONVERTED  07/22/2019    EF 50-55%.AVR. BRIE- 1.4 Cm2, Mild MR. RVSP- 29 mmHg.   • PACEMAKER IMPLANTATION  12/19/2017    [place] BIV PPM by Dr. Dickey   • MARYSE  09/06/2013    MARYSE- < 35%, Moderate AS. AI and MR. No Thrombus       Current Outpatient Medications   Medication Sig Dispense Refill   • acetaminophen (TYLENOL) 325 MG tablet Take 650 mg by mouth Every 6 (Six) Hours As Needed for Mild Pain .     • aspirin 81 MG EC tablet Take 81 mg by mouth daily.     • calcium carbonate (OS-PABLO) 600 MG tablet Take 600 mg by mouth Daily. CALCIUM 600 MG AND VITAMIN D3 800 IU     • carvedilol (COREG) 6.25 MG tablet Take 1 tablet by mouth 2 (Two) Times a Day. 180 tablet 3   • Cyanocobalamin (VITAMIN B12 PO) 1,500 mcg. 1/2 tablet daily     • docusate sodium (COLACE) 100 MG capsule Take 100 mg by mouth 2 (Two) Times a Day As Needed.      • ferrous sulfate 324 (65 Fe) MG tablet delayed-release EC tablet Take 324 mg by mouth Daily With Breakfast.     • furosemide (LASIX) 40 MG tablet Take 1 tablet by mouth Daily. 90 tablet 3   • ipratropium (ATROVENT) 0.03 % nasal spray 4 sprays into each nostril 2 (Two) Times a Day As Needed for Rhinitis.     • levothyroxine (SYNTHROID, LEVOTHROID) 88 MCG tablet Take 88 mcg by mouth Daily.     • lisinopril (PRINIVIL,ZESTRIL) 5 MG tablet Take 1 tablet by mouth Daily. 90 tablet 3   • magnesium oxide (MAG-OX) 400 MG tablet Take 400 mg by mouth Daily.     • Multiple Vitamin (MULTI VITAMIN DAILY PO) Take 1 tablet by mouth Daily.     • Omega 3 1000 MG capsule Take 1 tablet by mouth 2 (Two) Times a Day.     • potassium chloride (K-DUR) 10 MEQ CR tablet Take 1 tablet by mouth Daily. 90 tablet 3   • pravastatin (PRAVACHOL) 40 MG tablet Take 1 tablet by mouth Daily. 90 tablet 3   • risperiDONE (RISPERDAL) 4 MG tablet Take 4 mg by mouth Daily.     • rivaroxaban (XARELTO) 20 MG tablet Take 1 tablet by mouth Daily. 7 tablet 0   • vitamin C (ASCORBIC ACID) 500 MG tablet Take 500 mg by mouth Daily.       No current facility-administered medications for this visit.        Codeine; Penicillins; and Sulfa antibiotics    Past Medical History:   Diagnosis Date   • Aortic valve stenosis    • Bipolar affective disorder (CMS/HCC)    • CHF (congestive heart failure) (CMS/HCC)    • Diabetes mellitus (CMS/HCC)     DX'D TYPE II NIDDM APPROX 3-4 YEARS AGO, NO MEDS, CHECKS BLOOD SUGAR 2-3 TIMES PER WEEK, AVERAGE    • CLAYTON (generalized anxiety disorder)    • H/O dilation and curettage    • H/O eye surgery     Eye surgery- bilateral   • H/O:     • H/O: hysterectomy     s/p appendectomy   • Hallucinations    • History of AAA (abdominal aortic aneurysm) repair     (pt doesn't recall- on PCP note)   • Hyperlipidemia    • Hypertension    • Hypothyroidism    • Overactive bladder    • Stroke (CMS/HCC)     TIA 3 YEARS AGO, MEMORY LOSS     "  • SVT (supraventricular tachycardia) (CMS/HCC)    • Tachycardia    • Wears dentures     PARTIALS ON TOP AND BOTTOM    • Wears glasses        Social History     Socioeconomic History   • Marital status:      Spouse name: Not on file   • Number of children: 1   • Years of education: Not on file   • Highest education level: Not on file   Occupational History   • Occupation: RETIRED    Tobacco Use   • Smoking status: Never Smoker   • Smokeless tobacco: Never Used   Substance and Sexual Activity   • Alcohol use: No   • Drug use: No   • Sexual activity: Defer   Social History Narrative    Lives in [place], [place] with .       Family History   Problem Relation Age of Onset   • Heart disease Mother    • Diabetes Mother    • Cancer Father         Bone       Review of Systems   Constitution: Positive for weight loss (down 3 lb ). Negative for decreased appetite and malaise/fatigue.   HENT: Negative.    Eyes: Negative.    Cardiovascular: Positive for leg swelling (mild LE edema). Negative for chest pain and dyspnea on exertion.   Respiratory: Negative for cough and shortness of breath.    Endocrine: Negative.    Hematologic/Lymphatic: Negative.    Skin: Negative.    Musculoskeletal: Negative for falls and myalgias.   Gastrointestinal: Negative for abdominal pain and melena.   Genitourinary: Positive for dysuria (recurrent UTI ).   Neurological: Negative for dizziness.   Psychiatric/Behavioral: Negative for altered mental status and depression.   Allergic/Immunologic: Negative.       Diabetes- No  Thyroid-normal    Objective     /64   Pulse 76   Ht 154.9 cm (60.98\")   Wt 56.2 kg (124 lb)   BMI 23.44 kg/m²     Physical Exam   Constitutional: She is oriented to person, place, and time. She appears well-developed and well-nourished. No distress.   HENT:   Head: Normocephalic.   Eyes: Pupils are equal, round, and reactive to light.   Neck: Normal range of motion.   Cardiovascular: Normal rate, " regular rhythm and intact distal pulses.  No extrasystoles are present.   Murmur heard.   Systolic murmur is present with a grade of 3/6 at the upper right sternal border.  Pulmonary/Chest: Effort normal and breath sounds normal.   Abdominal: Soft. Bowel sounds are normal.   Musculoskeletal: Normal range of motion. She exhibits edema (1+ pedal edema ).   Neurological: She is alert and oriented to person, place, and time.   Skin: Skin is warm and dry. She is not diaphoretic.   Psychiatric: She has a normal mood and affect.        ECG 12 Lead  Date/Time: 2020 4:16 PM  Performed by: Lakia Prajapati APRN  Authorized by: Lakia Prajapati APRN   Comparison: compared with previous ECG from 2019  Similar to previous ECG  Rhythm: sinus rhythm and paced  Rate: normal  BPM: 76  Pacin% capture and ventricular pacingComments:  ms  Atrial sensed  Ventricular paced                 Assessment/Plan      Debbie was seen today for follow-up, lab, pacemaker check, dizziness, medications and med refill.    Diagnoses and all orders for this visit:    Aortic valve stenosis, etiology of cardiac valve disease unspecified    S/P AVR    Essential hypertension  -     lisinopril (PRINIVIL,ZESTRIL) 5 MG tablet; Take 1 tablet by mouth Daily.  -     carvedilol (COREG) 6.25 MG tablet; Take 1 tablet by mouth 2 (Two) Times a Day.    Hypercholesteremia  -     pravastatin (PRAVACHOL) 40 MG tablet; Take 1 tablet by mouth Daily.    LBBB (left bundle branch block)    NICM (nonischemic cardiomyopathy) (CMS/HCC)  -     carvedilol (COREG) 6.25 MG tablet; Take 1 tablet by mouth 2 (Two) Times a Day.  -     furosemide (LASIX) 40 MG tablet; Take 1 tablet by mouth Daily.    Paroxysmal atrial fibrillation (CMS/HCC)    SSS (sick sinus syndrome) (CMS/HCC)    SVT (supraventricular tachycardia) (CMS/HCC)    Typical atrial flutter (CMS/HCC)  -     ECG 12 Lead    Presence of biventricular cardiac pacemaker  -     ECG 12 Lead    Other orders  -      potassium chloride (K-DUR) 10 MEQ CR tablet; Take 1 tablet by mouth Daily.      Heart rate and blood pressure well controlled. EKG today showed atrial sensed, ventricular paced. She will have BiV pacer interrogated at upcoming appointment with Dr. Ricketts. She will continue Xarelto samples for now until she runs out. At that time, she is advised to contact office for instructions for warfarin therapy. Samples x 3 weeks given today. She has no bleeding. Continue lisinopril, Lasix and carvedilol for non-ischemic cardiomyopathy. Recent echocardiogram reviewed with her showing the EF improved to 55%. Aortic valve prosthesis functioning normally.  cardiomyopathy and arrhythmia. She has no S/S of volume overload. Labs are followed by Dr. Bhatti. Could we get next copy for her chart here? No changes made today. Refills sent for lisinopril, carvedilol, lasix, K, pravastatin. She appears stable. She Xarelto need to be held prior to cystoscope, clearance will be given. Follow up in six months or sooner if needed.    Patient's Body mass index is 23.44 kg/m². BMI is within normal parameters. No follow-up required..                 Electronically signed by TAYO Deshpande,  January 20, 2020 4:38 PM

## 2020-01-24 ENCOUNTER — OFFICE VISIT (OUTPATIENT)
Dept: CARDIOLOGY | Facility: CLINIC | Age: 80
End: 2020-01-24

## 2020-01-24 VITALS
HEIGHT: 60 IN | HEART RATE: 70 BPM | BODY MASS INDEX: 24.15 KG/M2 | DIASTOLIC BLOOD PRESSURE: 68 MMHG | WEIGHT: 123 LBS | SYSTOLIC BLOOD PRESSURE: 118 MMHG

## 2020-01-24 DIAGNOSIS — I42.8 NICM (NONISCHEMIC CARDIOMYOPATHY) (HCC): ICD-10-CM

## 2020-01-24 DIAGNOSIS — Z95.0 PRESENCE OF BIVENTRICULAR CARDIAC PACEMAKER: ICD-10-CM

## 2020-01-24 DIAGNOSIS — I48.0 PAROXYSMAL ATRIAL FIBRILLATION (HCC): Primary | ICD-10-CM

## 2020-01-24 DIAGNOSIS — I44.2 COMPLETE HEART BLOCK (HCC): ICD-10-CM

## 2020-01-24 DIAGNOSIS — I49.5 SSS (SICK SINUS SYNDROME) (HCC): ICD-10-CM

## 2020-01-24 PROCEDURE — 93281 PM DEVICE PROGR EVAL MULTI: CPT | Performed by: INTERNAL MEDICINE

## 2020-01-24 PROCEDURE — 99213 OFFICE O/P EST LOW 20 MIN: CPT | Performed by: INTERNAL MEDICINE

## 2020-01-24 NOTE — PROGRESS NOTES
Cardiac Electrophysiology Outpatient Follow Up Note            [facility]     Follow Up Office Visit      Debbie Rodrigez  [id number]  2020  [unfilled]  [unfilled]    Primary Care Physician: Reagan Bhatti MD    Referred By: No ref. provider found    Subjective     Chief Complaint:   Chief Complaint   Patient presents with   • Atrial Fibrillation       History of Present Illness:   Mrs. Debbie Rodrigez is a 79 y.o. female who presents to my electrophysiology clinic for follow up of sinus node dysfunction and complete heart block along with paroxysmal A. fib.  She is doing well.  She is taking her blood thinner reliably.  She denies any bleeding issues.  She has no chest pain nausea vomiting fevers chills syncope presyncope.  No new complaints really today.  Her  think she is doing well..      Review of Systems:   Constitutional: No fevers or chills, no recent weight gain or weight loss or fatigue  Eyes: No visual loss, blurred vision, double vision, yellow sclerae.  Cardiovascular: Per HPI  Respiratory: No cough or wheezing, no sputum production, no hematemesis   Gastrointestinal: No abdominal pain, no nausea, vomiting, constipation, diarrhea, melena.   Genitourinary: No dysuria, hematuria or increased frequency.  Musculoskeletal:  No gait disturbance, weakness or joint pain or stiffness  Integumentary: No rashes, urticaria, ulcers or sores.      Past Medical History:   Past Medical History:   Diagnosis Date   • Aortic valve stenosis    • Bipolar affective disorder (CMS/HCC)    • CHF (congestive heart failure) (CMS/[facility])    • Diabetes mellitus (CMS/[facility])     DX'D TYPE II NIDDM APPROX 3-4 YEARS AGO, NO MEDS, CHECKS BLOOD SUGAR 2-3 TIMES PER WEEK, AVERAGE    • CLAYTON (generalized anxiety disorder)    • H/O dilation and curettage    • H/O eye surgery     Eye surgery- bilateral   • H/O:     • H/O: hysterectomy     s/p appendectomy   • Hallucinations     • History of AAA (abdominal aortic aneurysm) repair     (pt doesn't recall- on PCP note)   • Hyperlipidemia    • Hypertension    • Hypothyroidism    • Overactive bladder    • Stroke (CMS/HCC)     TIA 3 YEARS AGO, MEMORY LOSS    • SVT (supraventricular tachycardia) (CMS/HCC)    • Tachycardia    • Wears dentures     PARTIALS ON TOP AND BOTTOM    • Wears glasses        Past Surgical History:   Past Surgical History:   Procedure Laterality Date   • AORTIC VALVE REPAIR/REPLACEMENT N/A 11/10/2017    Procedure: AORTIC VALVE REPAIR/REPLACEMENT WITH MARYSE;  Surgeon: Dmitry Darling MD;  Location:  [facility] OR;  Service:    • CARDIAC CATHETERIZATION  09/11/2002    Cath- EF 30% Normal Coronaries   • CARDIAC CATHETERIZATION  10/20/2017    Normal Coronaries. Sig AI. Unable to cross AV   • CARDIAC ELECTROPHYSIOLOGY PROCEDURE N/A 9/7/2018    Procedure: Ablation SVT;  Surgeon: Benito Chavira MD;  Location:  [facility] EP INVASIVE LOCATION;  Service: Cardiovascular   • CARDIOVASCULAR STRESS TEST  05/22/2003    P. Myoview- Normal. Pt had CP   • CARDIOVASCULAR STRESS TEST  03/12/2013    L. Myoview- Pt had CP. Negative   • CONVERTED (HISTORICAL) CARDIOVASCULAR STUDIES  01/22/2014    MUGA- EF 45%   • CONVERTED (HISTORICAL) HOLTER  10/10/2002    Holter- AVG HR 70 BPM   • ECHO - CONVERTED  09/15/2003    Echo- ([facility]) EF 55%. LBBB. RVSP- 42 mmHg   • ECHO - CONVERTED  03/12/2013    Echo- EF 60%. BRIE- 1.6 mmHg. RVSP- 42 mmHg   • ECHO - CONVERTED  10/15/2013    Echo-EF 35%, mod. MR, & AI. BRIE- 1.4 Cm2. RVSP- 40 mmHg   • ECHO - CONVERTED  06/09/2015    Echo- EF 45-50%, mod MR, Mod AS, BRIE 1.1 Cm2, Mod AR, RVSP- 35 mmHg   • ECHO - CONVERTED  02/21/2017    EF 55-60%, BRIE 1.0   • ECHO - CONVERTED  12/15/2017    @[facility]. EF 35-40%. LBBB. Mild- Mod MR   • ECHO - CONVERTED  05/01/2018    EF 45-50%. Mod MR. RVSP- 35 mmHg   • ECHO - CONVERTED  07/22/2019    EF 50-55%.AVR. BRIE- 1.4 Cm2, Mild MR. RVSP- 29 mmHg.   • PACEMAKER IMPLANTATION  12/19/2017    [place]  BIV PPM by Dr. Dickey   • MARYSE  09/06/2013    MARYSE- < 35%, Moderate AS. AI and MR. No Thrombus       Family History:   Family History   Problem Relation Age of Onset   • Heart disease Mother    • Diabetes Mother    • Cancer Father         Bone       Social History:   Social History     Socioeconomic History   • Marital status:      Spouse name: Not on file   • Number of children: 1   • Years of education: Not on file   • Highest education level: Not on file   Occupational History   • Occupation: RETIRED    Tobacco Use   • Smoking status: Never Smoker   • Smokeless tobacco: Never Used   Substance and Sexual Activity   • Alcohol use: Never     Frequency: Never   • Drug use: No   • Sexual activity: Defer   Social History Narrative    Lives in [place], [place] with .       Medications:     Current Outpatient Medications:   •  acetaminophen (TYLENOL) 325 MG tablet, Take 650 mg by mouth Every 6 (Six) Hours As Needed for Mild Pain ., Disp: , Rfl:   •  aspirin 81 MG EC tablet, Take 81 mg by mouth daily., Disp: , Rfl:   •  calcium carbonate (OS-PABLO) 600 MG tablet, Take 600 mg by mouth Daily. CALCIUM 600 MG AND VITAMIN D3 800 IU, Disp: , Rfl:   •  carvedilol (COREG) 6.25 MG tablet, Take 1 tablet by mouth 2 (Two) Times a Day., Disp: 180 tablet, Rfl: 3  •  Cyanocobalamin (VITAMIN B12 PO), 1,500 mcg. 1/2 tablet daily, Disp: , Rfl:   •  docusate sodium (COLACE) 100 MG capsule, Take 100 mg by mouth 2 (Two) Times a Day As Needed., Disp: , Rfl:   •  ferrous sulfate 324 (65 Fe) MG tablet delayed-release EC tablet, Take 324 mg by mouth Daily With Breakfast., Disp: , Rfl:   •  furosemide (LASIX) 40 MG tablet, Take 1 tablet by mouth Daily., Disp: 90 tablet, Rfl: 3  •  ipratropium (ATROVENT) 0.03 % nasal spray, 4 sprays into each nostril 2 (Two) Times a Day As Needed for Rhinitis., Disp: , Rfl:   •  levothyroxine (SYNTHROID, LEVOTHROID) 88 MCG tablet, Take 88 mcg by mouth Daily., Disp: , Rfl:   •  lisinopril  "(PRINIVIL,ZESTRIL) 5 MG tablet, Take 1 tablet by mouth Daily., Disp: 90 tablet, Rfl: 3  •  magnesium oxide (MAG-OX) 400 MG tablet, Take 400 mg by mouth Daily., Disp: , Rfl:   •  Multiple Vitamin (MULTI VITAMIN DAILY PO), Take 1 tablet by mouth Daily., Disp: , Rfl:   •  Omega 3 1000 MG capsule, Take 1 tablet by mouth 2 (Two) Times a Day., Disp: , Rfl:   •  potassium chloride (K-DUR) 10 MEQ CR tablet, Take 1 tablet by mouth Daily., Disp: 90 tablet, Rfl: 3  •  pravastatin (PRAVACHOL) 40 MG tablet, Take 1 tablet by mouth Daily., Disp: 90 tablet, Rfl: 3  •  risperiDONE (RISPERDAL) 4 MG tablet, Take 4 mg by mouth Daily., Disp: , Rfl:   •  rivaroxaban (XARELTO) 20 MG tablet, Take 1 tablet by mouth Daily., Disp: 7 tablet, Rfl: 0  •  vitamin C (ASCORBIC ACID) 500 MG tablet, Take 500 mg by mouth Daily., Disp: , Rfl:     Allergies:   Allergies   Allergen Reactions   • Codeine GI Intolerance   • Penicillins Rash   • Sulfa Antibiotics Rash       Objective     Physical Exam:  Vital Signs:   Vitals:    01/24/20 1217   BP: 118/68   BP Location: Left arm   Patient Position: Sitting   Pulse: 70   Weight: 55.8 kg (123 lb)   Height: 152.4 cm (60\")     GEN: Well nourished, well-developed, no acute distress  HEENT: Normocephalic, atraumatic, moist mucous membranes  NECK: Supple, no JVD, no thyromegaly, no lymphadenopathy  CARD: Regular rate and rhythm, normal S1 & S2 are present.  No murmur, gallop or rubs are appreciated.  LUNGS: Clear to auscultation bilateraly, normal respiratory effort  ABDOMEN: Soft, nontender, normal bowel sounds  EXTREMITIES: No gross deformities, no clubbing, cyanosis.  Edema trace bilaterally   SKIN: Warm, dry  NEURO: No focal deficits, alert and oriented x 3  PSYCHIATRIC: Normal affect and mood, appropriate use of semantics and logic.  Pacemaker site is well-healed for postoperative date.      Lab Results   Component Value Date    GLUCOSE 85 09/06/2018    CALCIUM 9.4 09/06/2018     09/06/2018    K 5.0 " 09/06/2018    CO2 23.0 09/06/2018     09/06/2018    BUN 24 (H) 09/06/2018    CREATININE 0.92 09/06/2018    EGFRIFNONA 59 (L) 09/06/2018    BCR 26.1 (H) 09/06/2018    ANIONGAP 7.0 09/06/2018     Lab Results   Component Value Date    WBC 9.26 09/06/2018    HGB 12.9 09/06/2018    HCT 38.9 09/06/2018    MCV 95.6 09/06/2018     09/06/2018     Lab Results   Component Value Date    INR 1.05 09/06/2018    INR 1.04 11/19/2017    INR 1.23 11/11/2017    PROTIME 11.0 09/06/2018    PROTIME 11.4 11/19/2017    PROTIME 13.5 (H) 11/11/2017     No results found for: TSH, L0RTFVQ, C0VJWDC, THYROIDAB    Cardiac Testing:     I personally viewed and interpreted the patient's EKG/Telemetry/lab data    Procedures    Tobacco Cessation: N/A  Obstructive Sleep Apnea Screening: N/A    Assessment & Plan        Debbie was seen today for atrial fibrillation.    Diagnoses and all orders for this visit:    Paroxysmal atrial fibrillation (CMS/HCC)    SSS (sick sinus syndrome) (CMS/HCC)    Presence of biventricular cardiac pacemaker    NICM (nonischemic cardiomyopathy) (CMS/HCC)    Complete heart block (CMS/HCC)        She is doing well.  She has no difficulty taking her blood thinner.  She has for some samples today we will give this to her if able.  She has no palpitations or symptoms from her age fibrillation and by device interrogation her episodes of A. fib are quite rare now.  At that she still has have her residual significant risk of stroke and should remain anticoagulated for the indeterminate future.    Her [employer] resynchronization pacemaker is probably program and has adequate pacing sensing safety margins.  Rate response was programmed on today at our visit to afford her improve chronotropic competency.  We will follow-up with her in 6 months time if she does not have home monitoring.        Follow Up:       Thank you for allowing me to participate in the care of your patient. Please to not hesitate to contact me  with additional questions or concerns.        Ovi Ricketts, DO, FACC, [facility]  Cardiac Electrophysiologist

## 2020-04-02 NOTE — TELEPHONE ENCOUNTER
Patient was in this week for visit. Needing samples of Xarelto 20 mg . #28 given.   Include Pregnancy/Lactation Warning?: No

## 2020-04-07 ENCOUNTER — TELEPHONE (OUTPATIENT)
Dept: CARDIOLOGY | Facility: CLINIC | Age: 80
End: 2020-04-07

## 2020-04-07 NOTE — TELEPHONE ENCOUNTER
Patient requesting samples of Xarelto 20mg daily. Currently out of Xarelto 20 mg samples, per Jazmin Alston APRN may give samples of Xarelto 15mg and two 2.5mg xarelto samples to equal 20mg. Patient's  aware of instructions on taking Xarelto.  Xarelto 2.5mg  LOT [id number] expiration 08/30/21  Xarelto 15mg   LOT [id number]   Exp 02/20/22

## 2020-07-21 ENCOUNTER — OFFICE VISIT (OUTPATIENT)
Dept: CARDIOLOGY | Facility: CLINIC | Age: 80
End: 2020-07-21

## 2020-07-21 VITALS
WEIGHT: 122.4 LBS | TEMPERATURE: 99.4 F | DIASTOLIC BLOOD PRESSURE: 62 MMHG | HEIGHT: 60 IN | HEART RATE: 84 BPM | SYSTOLIC BLOOD PRESSURE: 118 MMHG | BODY MASS INDEX: 24.03 KG/M2

## 2020-07-21 DIAGNOSIS — Z95.0 PRESENCE OF BIVENTRICULAR CARDIAC PACEMAKER: ICD-10-CM

## 2020-07-21 DIAGNOSIS — E78.00 HYPERCHOLESTEREMIA: ICD-10-CM

## 2020-07-21 DIAGNOSIS — T45.515A WARFARIN-INDUCED COAGULOPATHY (HCC): ICD-10-CM

## 2020-07-21 DIAGNOSIS — I10 ESSENTIAL HYPERTENSION: ICD-10-CM

## 2020-07-21 DIAGNOSIS — Z95.2 S/P AVR: ICD-10-CM

## 2020-07-21 DIAGNOSIS — D68.32 WARFARIN-INDUCED COAGULOPATHY (HCC): ICD-10-CM

## 2020-07-21 DIAGNOSIS — I49.5 SSS (SICK SINUS SYNDROME) (HCC): ICD-10-CM

## 2020-07-21 DIAGNOSIS — I48.0 PAROXYSMAL ATRIAL FIBRILLATION (HCC): ICD-10-CM

## 2020-07-21 PROCEDURE — 93000 ELECTROCARDIOGRAM COMPLETE: CPT | Performed by: NURSE PRACTITIONER

## 2020-07-21 PROCEDURE — 99214 OFFICE O/P EST MOD 30 MIN: CPT | Performed by: NURSE PRACTITIONER

## 2020-07-21 RX ORDER — WARFARIN SODIUM 3 MG/1
TABLET ORAL
Qty: 60 TABLET | Refills: 6 | Status: SHIPPED | OUTPATIENT
Start: 2020-07-21 | End: 2020-08-24 | Stop reason: SDUPTHER

## 2020-07-21 NOTE — PROGRESS NOTES
Chief Complaint   Patient presents with   • Follow-up     Cardiac management.   • Lab     Last labs 1 month ago per PCP. Does not need refills at this time.   • Pacemaker Check     Last [employer] BIV PPM interrogation 1/24/20 per Dr Ricketts.   • Low Blood Pressure     Had 2 episodes of low B/P readings in the evenings.   • Fatigue     Having more weakness, she has to hold to objects to walk at times.   • Coumadin     Wants to talk about changing Xarelto to Coumadin.       Bri Rodrigez is a 79 y.o. female with significant aortic valvular stenosis for which she underwent aortic valve replacement in 2017.  She also developed atrial fibrillation and was treated for the same.  Her LV function dropped along with the left bundle branch block and had BIV PPM placed.  She continued to have arrhythmias in the form of flutter and was seen by Dr. Chavira who did AV ashley ablation along with right atrial isthmus ablation.  She continues to follow with EP who follows her device. She has been anticoagulated. Echo repeated 7/2019 showed EF improved to 55%, BRIE 1.4 cm2, mild MR. On 1/24/2020 [employer] biventricular pacemaker interrogation showed 28% atrial and 100% ventricular pacing.  Battery life was greater than 8 years.     Today she comes to the office for a follow up visit accompanied by her .  She denies chest pain or increased shortness of breath.  She has swelling of her feet which is not a new or worsening symptom.  Heart rate has been normal.  She reports 2 episodes of low blood pressure readings that occurred prior to bedtime.  The lowest systolic reading reported as 84.  After lying down and elevating her feet her weakness improved and repeat blood pressure check later in the evening was normal.  Her 's feels it was related to change in her diet as she denies any change in medication management.  No active signs of bleeding or increased bruising noted.    HPI     Cardiac  History:    Past Surgical History:   Procedure Laterality Date   • AORTIC VALVE REPAIR/REPLACEMENT N/A 11/10/2017    Procedure: AORTIC VALVE REPAIR/REPLACEMENT WITH MARYSE;  Surgeon: Dmitry Darling MD;  Location: [facility] OR;  Service:    • CARDIAC CATHETERIZATION  09/11/2002    Cath- EF 30% Normal Coronaries   • CARDIAC CATHETERIZATION  10/20/2017    Normal Coronaries. Sig AI. Unable to cross AV   • CARDIAC ELECTROPHYSIOLOGY PROCEDURE N/A 9/7/2018    Procedure: Ablation SVT;  Surgeon: Benito Chavira MD;  Location: [facility] EP INVASIVE LOCATION;  Service: Cardiovascular   • CARDIOVASCULAR STRESS TEST  05/22/2003    P. Myoview- Normal. Pt had CP   • CARDIOVASCULAR STRESS TEST  03/12/2013    L. Myoview- Pt had CP. Negative   • CONVERTED (HISTORICAL) CARDIOVASCULAR STUDIES  01/22/2014    MUGA- EF 45%   • CONVERTED (HISTORICAL) HOLTER  10/10/2002    Holter- AVG HR 70 BPM   • ECHO - CONVERTED  09/15/2003    Echo- ([facility]) EF 55%. LBBB. RVSP- 42 mmHg   • ECHO - CONVERTED  03/12/2013    Echo- EF 60%. BRIE- 1.6 mmHg. RVSP- 42 mmHg   • ECHO - CONVERTED  10/15/2013    Echo-EF 35%, mod. MR, & AI. BRIE- 1.4 Cm2. RVSP- 40 mmHg   • ECHO - CONVERTED  06/09/2015    Echo- EF 45-50%, mod MR, Mod AS, BRIE 1.1 Cm2, Mod AR, RVSP- 35 mmHg   • ECHO - CONVERTED  02/21/2017    EF 55-60%, BRIE 1.0   • ECHO - CONVERTED  12/15/2017    @[facility]. EF 35-40%. LBBB. Mild- Mod MR   • ECHO - CONVERTED  05/01/2018    EF 45-50%. Mod MR. RVSP- 35 mmHg   • ECHO - CONVERTED  07/22/2019    EF 50-55%.AVR. BRIE- 1.4 Cm2, Mild MR. RVSP- 29 mmHg.   • PACEMAKER IMPLANTATION  12/19/2017    [place] BIV PPM by Dr. Dickey   • MARYSE  09/06/2013    MARYSE- < 35%, Moderate AS. AI and MR. No Thrombus       Current Outpatient Medications   Medication Sig Dispense Refill   • acetaminophen (TYLENOL) 325 MG tablet Take 650 mg by mouth Every 6 (Six) Hours As Needed for Mild Pain .     • aspirin 81 MG EC tablet Take 81 mg by mouth daily.     • calcium carbonate (OS-PABLO) 600 MG tablet Take  600 mg by mouth Daily. CALCIUM 600 MG AND VITAMIN D3 800 IU     • carvedilol (COREG) 6.25 MG tablet Take 1 tablet by mouth 2 (Two) Times a Day. 180 tablet 3   • Cyanocobalamin (VITAMIN B12 PO) 1,500 mcg. 1/2 tablet daily     • docusate sodium (COLACE) 100 MG capsule Take 100 mg by mouth 2 (Two) Times a Day As Needed.     • ferrous sulfate 324 (65 Fe) MG tablet delayed-release EC tablet Take 324 mg by mouth Daily With Breakfast.     • furosemide (LASIX) 40 MG tablet Take 1 tablet by mouth Daily. (Patient taking differently: Take 20 mg by mouth Daily.) 90 tablet 3   • ipratropium (ATROVENT) 0.03 % nasal spray 4 sprays into each nostril 2 (Two) Times a Day As Needed for Rhinitis.     • levothyroxine (SYNTHROID, LEVOTHROID) 88 MCG tablet Take 88 mcg by mouth Daily.     • lisinopril (PRINIVIL,ZESTRIL) 5 MG tablet Take 1 tablet by mouth Daily. 90 tablet 3   • Multiple Vitamin (MULTI VITAMIN DAILY PO) Take 1 tablet by mouth Daily.     • Omega 3 1000 MG capsule Take 1 tablet by mouth 2 (Two) Times a Day.     • potassium chloride (K-DUR) 10 MEQ CR tablet Take 1 tablet by mouth Daily. 90 tablet 3   • pravastatin (PRAVACHOL) 40 MG tablet Take 1 tablet by mouth Daily. 90 tablet 3   • risperiDONE (RISPERDAL) 4 MG tablet Take 4 mg by mouth Every Night.     • vitamin C (ASCORBIC ACID) 500 MG tablet Take 500 mg by mouth Daily.     • warfarin (COUMADIN) 3 MG tablet As directed 60 tablet 6     No current facility-administered medications for this visit.        Codeine; Penicillins; and Sulfa antibiotics    Past Medical History:   Diagnosis Date   • Aortic valve stenosis    • Bipolar affective disorder (CMS/HCC)    • CHF (congestive heart failure) (CMS/HCC)    • Diabetes mellitus (CMS/HCC)     DX'D TYPE II NIDDM APPROX 3-4 YEARS AGO, NO MEDS, CHECKS BLOOD SUGAR 2-3 TIMES PER WEEK, AVERAGE    • CLAYTON (generalized anxiety disorder)    • H/O dilation and curettage    • H/O eye surgery     Eye surgery- bilateral   • H/O:      "  • H/O: hysterectomy     s/p appendectomy   • Hallucinations    • History of AAA (abdominal aortic aneurysm) repair     (pt doesn't recall- on PCP note)   • Hyperlipidemia    • Hypertension    • Hypothyroidism    • Overactive bladder    • Stroke (CMS/HCC)     TIA 3 YEARS AGO, MEMORY LOSS    • SVT (supraventricular tachycardia) (CMS/HCC)    • Tachycardia    • Wears dentures     PARTIALS ON TOP AND BOTTOM    • Wears glasses        Social History     Socioeconomic History   • Marital status:      Spouse name: Not on file   • Number of children: 1   • Years of education: Not on file   • Highest education level: Not on file   Occupational History   • Occupation: RETIRED    Tobacco Use   • Smoking status: Never Smoker   • Smokeless tobacco: Never Used   Substance and Sexual Activity   • Alcohol use: Never     Frequency: Never   • Drug use: No   • Sexual activity: Defer   Social History Narrative    Lives in [place], [place] with .       Family History   Problem Relation Age of Onset   • Heart disease Mother    • Diabetes Mother    • Cancer Father         Bone       Review of Systems   Constitution: Positive for malaise/fatigue (\"same\"). Negative for decreased appetite, diaphoresis and fever.   HENT: Negative for congestion and nosebleeds.    Eyes: Negative for redness and visual disturbance.   Cardiovascular: Positive for leg swelling (pedal, not a new problem). Negative for chest pain, claudication, dyspnea on exertion, irregular heartbeat and palpitations.   Respiratory: Positive for shortness of breath.    Endocrine: Negative for polydipsia, polyphagia and polyuria.   Hematologic/Lymphatic: Negative for bleeding problem. Does not bruise/bleed easily.   Musculoskeletal: Positive for muscle weakness (in legs). Negative for falls and muscle cramps.   Gastrointestinal: Negative for change in bowel habit, dysphagia, heartburn and melena.   Genitourinary: Positive for frequency. Negative for dysuria " "and hematuria.   Neurological: Positive for loss of balance (\"have to hold on to things when walk sometimes\") and weakness (2 episodes associated with low bp).   Psychiatric/Behavioral: Positive for depression. The patient is nervous/anxious.    Allergic/Immunologic: Negative.         Objective     /62 (BP Location: Right arm)   Pulse 84   Temp 99.4 °F (37.4 °C)   Ht 152.4 cm (60\")   Wt 55.5 kg (122 lb 6.4 oz)   BMI 23.90 kg/m²     Physical Exam   Constitutional: She is oriented to person, place, and time. She appears well-nourished.   HENT:   Head: Normocephalic.   Eyes: Pupils are equal, round, and reactive to light. Conjunctivae are normal.   Neck: Normal range of motion. Neck supple. Carotid bruit is not present.   Cardiovascular: Normal rate, regular rhythm, S1 normal, S2 normal and normal pulses.   Murmur heard.  Pulmonary/Chest: Breath sounds normal. She has no wheezes. She has no rales.   Abdominal: Soft. Bowel sounds are normal. She exhibits no distension. There is no tenderness.   Musculoskeletal: Normal range of motion. She exhibits edema (pedal). She exhibits no tenderness.   Neurological: She is alert and oriented to person, place, and time.   Skin: Skin is warm. No pallor.   Psychiatric: She has a normal mood and affect. Her behavior is normal.          ECG 12 Lead  Date/Time: 7/21/2020 1:37 PM  Performed by: Jazmin Alston APRN  Authorized by: Jazmin Alston APRN   Comparison: compared with previous ECG from 1/20/2020  Similar to previous ECG  Comparison to previous ECG: Atrial sensed, ventricular paced  Rhythm: paced  BPM: 84                  Assessment/Plan      Debbie was seen today for follow-up, lab, pacemaker check, low blood pressure, fatigue and coumadin.    Diagnoses and all orders for this visit:    SSS (sick sinus syndrome) (CMS/HCC)  -     ECG 12 Lead    Presence of biventricular cardiac pacemaker  -     ECG 12 Lead    Paroxysmal atrial fibrillation (CMS/HCC)  -     " Protime-INR; Standing    Warfarin-induced coagulopathy (CMS/HCC)  -     Protime-INR; Standing    Essential hypertension    Hypercholesteremia    S/P AVR    Other orders  -     warfarin (COUMADIN) 3 MG tablet; As directed        SSS/pacemaker-EKG today shows sinus with ventricular pacing.  She follows with EP for management.    PAF- due to out-of-pocket cost for Xarelto patient request changing to warfarin.  A prescription for warfarin 3 mg given.  She will contact our office with instructions when and what dose of warfarin to take based on PT/INR results.    Hypertension-blood pressure normal today.  No change in current antihypertensives advised.  No medication refills needed at this time.    Cardiac murmur/AVR- clinically cardiac murmur appears stable.  Last echo done July 2019 showed stable cardiac function and BRIE of 1.1 cm².  Patient admits to mild weakness and fatigue but no chest pain, shortness breath, or dizziness.  Patient declines repeat echocardiogram at this time but agrees to call should she develop new or worsening symptoms.    Hypercholesterolemia-she remains on statin therapy per protocol.  She follows with you for lab orders/management.  Currently no side effects noted.  No change in lipid management advised at this time.    Patient's Body mass index is 23.9 kg/m². BMI is within normal parameters. No follow-up required.  We discussed Coumadin diet and need to avoid foods rich in vitamin K.  Patient admits to a very regimented diet which should help with management of warfarin.                            Electronically signed by TAYO Black,  July 21, 2020 17:30

## 2020-08-24 ENCOUNTER — TELEPHONE (OUTPATIENT)
Dept: CARDIOLOGY | Facility: CLINIC | Age: 80
End: 2020-08-24

## 2020-08-24 RX ORDER — WARFARIN SODIUM 3 MG/1
TABLET ORAL
Qty: 60 TABLET | Refills: 6 | Status: SHIPPED | OUTPATIENT
Start: 2020-08-24 | End: 2020-09-11

## 2020-08-24 NOTE — TELEPHONE ENCOUNTER
called back. Pt also see's [employer] Beltran. He spoke with them, they will monitor and check as needed. Will discuss with her nurse to advise the mg we have sent in . Spoke with Lilli and advised of situation and that 3 mg tabs sent in,  aware to start with 1 tab daily with dose changes according to [facility] Beltr[facility].

## 2020-08-24 NOTE — TELEPHONE ENCOUNTER
Pt's  called, pt will be out of Xarelto tomorrow. Would like her to start Coumandin. Needs script sent to Pharmacy. He is going to call around and find the cheapest place to have her INR checked and will let me know so I can fax an order.

## 2020-08-25 NOTE — TELEPHONE ENCOUNTER
Pt's  called back.  Pt is now going to have to pay a co pay every time she has labs done at PCP's office. Gave him the option for at home monitoring. Checked with company, they are waiving the  Waiting period. He got another month of Xarelto samples, he will call me before she runs out to get started on Coumadin.

## 2020-08-28 NOTE — TELEPHONE ENCOUNTER
Pt called back, has rounded up enough samples to last several months. Will call back before she runs out.

## 2020-09-11 ENCOUNTER — OFFICE VISIT (OUTPATIENT)
Dept: CARDIOLOGY | Facility: CLINIC | Age: 80
End: 2020-09-11

## 2020-09-11 VITALS
HEIGHT: 61 IN | DIASTOLIC BLOOD PRESSURE: 88 MMHG | OXYGEN SATURATION: 97 % | TEMPERATURE: 96.9 F | BODY MASS INDEX: 22.69 KG/M2 | SYSTOLIC BLOOD PRESSURE: 152 MMHG | WEIGHT: 120.2 LBS | HEART RATE: 79 BPM

## 2020-09-11 DIAGNOSIS — I48.0 PAROXYSMAL ATRIAL FIBRILLATION (HCC): ICD-10-CM

## 2020-09-11 DIAGNOSIS — I42.8 NICM (NONISCHEMIC CARDIOMYOPATHY) (HCC): Primary | ICD-10-CM

## 2020-09-11 DIAGNOSIS — I48.3 TYPICAL ATRIAL FLUTTER (HCC): ICD-10-CM

## 2020-09-11 PROCEDURE — 99213 OFFICE O/P EST LOW 20 MIN: CPT | Performed by: INTERNAL MEDICINE

## 2020-09-11 PROCEDURE — 93281 PM DEVICE PROGR EVAL MULTI: CPT | Performed by: INTERNAL MEDICINE

## 2020-09-11 NOTE — PROGRESS NOTES
Debbie B Lay  1940  [phone]    09/11/2020    [facility] CARDIOLOGY     Reagan Bhatti MD  [address]  [facility] [address]    Chief Complaint   Patient presents with   • SSS   • Atrial Fibrillation   • AORTIC VALVE STENOSIS         Problem List:   1.  NICM:              A.  Left heart catheterization 9/11/2002: EF 30%, normal coronary arteries              B.  Myoview stress test 5/22/2003: Normal stress test, data deficit              C.  Echocardiogram 9/15/2003: EF 55%, RVSP 42 mmHg, data deficit              D. Echocardiogram 3/12/2013 EF 60%, RVSP 42 mmHg              E. Myoview stress test 3/12/2013 negative for ischemia              F. Echocardiogram 10/15/2013: EF 35%, moderate MR, moderate AI, RVSP 40 mmHg              G. Transesophageal echocardiogram 9/6/2013 EF less than 35%, moderate AF, AI, and MR              H. MUGA scan 1/22/2014: EF 62%              I.  Echocardiogram 6/9/2015: EF 45-50%, moderate MR, moderate AF, moderate AI, RVSP 35 mmHg              J. Echocardiogram 2/21/2017: EF 55-60%              K. Echocardiogram 8/30/17: EF 40-45%, moderate to severe AS, moderate AI, mild MR, RVSP 38 mm Hg              L. Left heart catheterization 10/2017: Normal coronary arteries, significant AS and AI with AVR recommended              M. Echocardiogram 12/15/2017: EF 35-40%, left bundle branch block, mild to moderate MR              N. S/p BSC Bi-V PM with Dr. Dickey 12/19/17              O. Echocardiogram 5/1/18: EF 45-50%, moderate MR, RVSP 35 mmHg, mild LVH   P. Echocardiogram 7/22/19: EF 51-55%, prosthetic aortic valve, mild to moderate AS, mild MR/TR  2. SVT/Atrial Flutter               A. Noted on device interrogation 7/2018              B. Typical Atrial Flutter Ablation 9/7/18  3. Atrial fibrillation:              A. Developed postoperatively after tissue aortic valve replacement in November, initiated on flecainide  and Xarelto              B. 7  day Holter monitor 2018:16.3% A. fib burden, average heart rate 90 bpm, minimum 51 bpm,  maximum 202 bpm               C. AVN ablation 18  4. Valvular heart disease:              A. Aortic stenosis s/p tissue AVR 11/10/17 with Dr. Darling, EF during intra-operative MARYSE showing EF 60%  5. Tachybrady syndrome:              A. S/p BSC BI-V PM with Dr. Dickey 17  6. Hypertension  7. Hyperlipidemia  8. Hypothyroidism  9. Diabetes mellitus, type II  10. Bipolar disorder  11. TIA  12. Generalized anxiety disorder  13. Surgical history:              A. D&C              B.  section              C. Hysterectomy              D. Appendectomy              E. Eye surgery        Allergies  Allergies   Allergen Reactions   • Codeine GI Intolerance   • Penicillins Rash   • Sulfa Antibiotics Rash       Current Medications    Current Outpatient Medications:   •  acetaminophen (TYLENOL) 325 MG tablet, Take 650 mg by mouth Every 6 (Six) Hours As Needed for Mild Pain ., Disp: , Rfl:   •  aspirin 81 MG EC tablet, Take 81 mg by mouth daily., Disp: , Rfl:   •  calcium carbonate (OS-PABLO) 600 MG tablet, Take 600 mg by mouth Daily. CALCIUM 600 MG AND VITAMIN D3 800 IU, Disp: , Rfl:   •  carvedilol (COREG) 6.25 MG tablet, Take 1 tablet by mouth 2 (Two) Times a Day., Disp: 180 tablet, Rfl: 3  •  Cyanocobalamin (VITAMIN B12 PO), 1,500 mcg. 1/2 tablet daily, Disp: , Rfl:   •  docusate sodium (COLACE) 100 MG capsule, Take 100 mg by mouth 2 (Two) Times a Day As Needed., Disp: , Rfl:   •  ferrous sulfate 324 (65 Fe) MG tablet delayed-release EC tablet, Take 324 mg by mouth Daily With Breakfast., Disp: , Rfl:   •  furosemide (LASIX) 40 MG tablet, Take 1 tablet by mouth Daily. (Patient taking differently: Take 20 mg by mouth Daily.), Disp: 90 tablet, Rfl: 3  •  ipratropium (ATROVENT) 0.03 % nasal spray, 4 sprays into each nostril 2 (Two) Times a Day As Needed for Rhinitis., Disp: , Rfl:   •  levothyroxine (SYNTHROID, LEVOTHROID)  "88 MCG tablet, Take 88 mcg by mouth Daily., Disp: , Rfl:   •  lisinopril (PRINIVIL,ZESTRIL) 5 MG tablet, Take 1 tablet by mouth Daily., Disp: 90 tablet, Rfl: 3  •  Multiple Vitamin (MULTI VITAMIN DAILY PO), Take 1 tablet by mouth Daily., Disp: , Rfl:   •  Omega 3 1000 MG capsule, Take 1 tablet by mouth 2 (Two) Times a Day., Disp: , Rfl:   •  potassium chloride (K-DUR) 10 MEQ CR tablet, Take 1 tablet by mouth Daily., Disp: 90 tablet, Rfl: 3  •  pravastatin (PRAVACHOL) 40 MG tablet, Take 1 tablet by mouth Daily., Disp: 90 tablet, Rfl: 3  •  risperiDONE (RISPERDAL) 4 MG tablet, Take 4 mg by mouth Every Night., Disp: , Rfl:   •  rivaroxaban (XARELTO) 20 MG tablet, Take 20 mg by mouth Daily., Disp: , Rfl:   •  vitamin C (ASCORBIC ACID) 500 MG tablet, Take 500 mg by mouth Daily., Disp: , Rfl:     History of Present Illness     Pt presents for follow up of PAF, SSS, NICM, and PM check. Since we last saw the pt, pt denies any palpitations episodes, SOB, CP, LH. She states she has dizziness in the morning. She is not able to tell me what her BPs are in the morning but thinks they are high. She feels better after she eats breakfast and takes her medications. She was in the hospital in February after a syncopal spell and was admitted to [facility]. She does not know a lot of details but states she was dehydrated from a medication that Dr. Morgan gave her. Dr. Waller told her not to take it anymore. No bleeding issues on Xarelto, or TIA/CVA symptoms.     ROS:  General:  + fatigue, weight gain + loss (unintentional) - had normal labwork in June with PCP  Cardiovascular:  Denies CP, PND,+  syncope, near syncope, edema or palpitations.  Pulmonary:  Denies CUNHA, cough, or wheezing      Vitals:    09/11/20 1031   BP: 152/88   BP Location: Right arm   Patient Position: Sitting   Pulse: 79   Temp: 96.9 °F (36.1 °C)   SpO2: 97%   Weight: 54.5 kg (120 lb 3.2 oz)   Height: 154.9 cm (61\")     Body mass index is 22.71 " kg/m².  PE:  General: NAD  Neck: no JVD, no carotid bruits, no TM  Heart RRR, NL S1, S2, S4 present, no rubs, murmurs  Lungs: CTA, no wheezes, rhonchi, or rales  Abd: soft, non-tender, NL BS  Ext: No musculoskeletal deformities, no edema, cyanosis, or clubbing  Psych: normal mood and affect    Diagnostic Data:    BiV PM: normal function. Less than 1% afib. 16 beat run VT on 9/3/2020. 7.5 years on battery. 100% BiV paced.     Procedures    1. NICM (nonischemic cardiomyopathy) (CMS/HCC)    2. Typical atrial flutter (CMS/[facility])    3. Paroxysmal atrial fibrillation (CMS/[facility])          Plan:  1. NICM: Class I CHF   - EF 51-55% on echo in 2019  - on ACE and Coreg 6.25 mg BID.      2. Typical Atrial Flutter:  - s/p RFA 9/2018  - on Xarelto      3. PAF:  - Less than 1% mode switch on device check  - s/p AVN ablation with normal device function today.     4. Syncope: sounds orthostasis  - get records from [facility] from February- suspected dedydration  - had a 16 beat run of VT on 9/3/2020 but patient asymptomatic. Not during syncopal episode    F/up in 6 months    Scribed for Benito Chavira MD by Geetha Kate PA-C. 9/11/2020  10:46     I, Benito Chavira MD, personally performed the services described in this documentation as scribed by the above named individual in my presence, and it is both accurate and complete.  9/11/2020  10:50

## 2021-01-18 ENCOUNTER — OFFICE VISIT (OUTPATIENT)
Dept: CARDIOLOGY | Facility: CLINIC | Age: 81
End: 2021-01-18

## 2021-01-18 VITALS
SYSTOLIC BLOOD PRESSURE: 120 MMHG | HEART RATE: 83 BPM | BODY MASS INDEX: 23.53 KG/M2 | WEIGHT: 124.6 LBS | TEMPERATURE: 97.8 F | DIASTOLIC BLOOD PRESSURE: 70 MMHG | HEIGHT: 61 IN

## 2021-01-18 DIAGNOSIS — I35.0 AORTIC VALVE STENOSIS, ETIOLOGY OF CARDIAC VALVE DISEASE UNSPECIFIED: Primary | ICD-10-CM

## 2021-01-18 DIAGNOSIS — I48.3 TYPICAL ATRIAL FLUTTER (HCC): ICD-10-CM

## 2021-01-18 DIAGNOSIS — Z95.2 S/P AVR: ICD-10-CM

## 2021-01-18 DIAGNOSIS — I48.0 PAROXYSMAL ATRIAL FIBRILLATION (HCC): ICD-10-CM

## 2021-01-18 DIAGNOSIS — Z95.0 PRESENCE OF BIVENTRICULAR CARDIAC PACEMAKER: ICD-10-CM

## 2021-01-18 DIAGNOSIS — I42.8 NICM (NONISCHEMIC CARDIOMYOPATHY) (HCC): ICD-10-CM

## 2021-01-18 DIAGNOSIS — I10 ESSENTIAL HYPERTENSION: ICD-10-CM

## 2021-01-18 DIAGNOSIS — E78.00 HYPERCHOLESTEREMIA: ICD-10-CM

## 2021-01-18 PROCEDURE — 93000 ELECTROCARDIOGRAM COMPLETE: CPT | Performed by: NURSE PRACTITIONER

## 2021-01-18 PROCEDURE — 99214 OFFICE O/P EST MOD 30 MIN: CPT | Performed by: NURSE PRACTITIONER

## 2021-01-18 RX ORDER — LISINOPRIL 5 MG/1
5 TABLET ORAL DAILY
Qty: 90 TABLET | Refills: 3 | Status: SHIPPED | OUTPATIENT
Start: 2021-01-18 | End: 2021-07-19 | Stop reason: DRUGHIGH

## 2021-01-18 RX ORDER — CARVEDILOL 6.25 MG/1
6.25 TABLET ORAL 2 TIMES DAILY
Qty: 180 TABLET | Refills: 3 | Status: SHIPPED | OUTPATIENT
Start: 2021-01-18 | End: 2022-01-25 | Stop reason: SDUPTHER

## 2021-01-18 RX ORDER — PRAVASTATIN SODIUM 40 MG
40 TABLET ORAL DAILY
Qty: 90 TABLET | Refills: 3 | Status: SHIPPED | OUTPATIENT
Start: 2021-01-18 | End: 2022-01-04

## 2021-01-18 RX ORDER — POTASSIUM CHLORIDE 750 MG/1
10 TABLET, FILM COATED, EXTENDED RELEASE ORAL DAILY
Qty: 90 TABLET | Refills: 3 | Status: SHIPPED | OUTPATIENT
Start: 2021-01-18 | End: 2022-01-25 | Stop reason: SDUPTHER

## 2021-01-18 RX ORDER — FUROSEMIDE 20 MG/1
20 TABLET ORAL DAILY
Qty: 90 TABLET | Refills: 3 | Status: SHIPPED | OUTPATIENT
Start: 2021-01-18 | End: 2022-01-25 | Stop reason: SDUPTHER

## 2021-01-18 RX ORDER — FUROSEMIDE 20 MG/1
20 TABLET ORAL DAILY
COMMUNITY
End: 2021-01-18 | Stop reason: SDUPTHER

## 2021-01-18 NOTE — PROGRESS NOTES
Chief Complaint   Patient presents with   • Follow-up     Cardiac management.   • Lab     Last labs per PCP in June or July.   • Pacemaker Check     Last [employer] BIV PPM interrogation 9/11/20.   • Edema     Having swelling in feet and ankles, she went back to taking Lasix 40mg daily for the last week. PCP had decreased lasix to 20mg daily.    • Med Refill     Needs refills on cardiac medications-90 day.     Bri Rodrigez is a 80 y.o. female with severe AS for which she underwent aortic valve replacement in 2017.  She developed atrial fibrillation and was treated for the same.  Her LV function declined and with LBBB, she underwent BIV PPM placement.  She continued to have arrhythmias, atrial flutter and was referred to Dr. Chavira who performed AV ashley ablation along with right atrial isthmus ablation.  She continues to follow with EP who follows her device. She has been anticoagulated. Echo repeated 7/2019 showed EF improved to 55%, BRIE 1.4 cm2, mild MR.  [facility] device check on 9/11/2020 showed 25% RA paced, 100% BiV pacing. 7.5 years battery life, 25 mode switches,  max <1 hour, 16 beat NSVT.     She returns today for regular follow-up accompanied by her .  Overall health has been more stable the last 6 months.  She denies chest pain, shortness of breath, palpitations, dizziness or syncope.  She continues to have mild lower extremity edema for which she takes an extra Lasix at times. Labs are followed by Dr. Bhatti, checked approximately 6 months ago.       Cardiac History:    Past Surgical History:   Procedure Laterality Date   • AORTIC VALVE REPAIR/REPLACEMENT N/A 11/10/2017    Procedure: AORTIC VALVE REPAIR/REPLACEMENT WITH MARYSE;  Surgeon: Dmitry Darling MD;  Location: [facility];  Service:    • CARDIAC CATHETERIZATION  09/11/2002    Cath- EF 30% Normal Coronaries   • CARDIAC CATHETERIZATION  10/20/2017    Normal Coronaries. Sig AI. Unable to cross AV   • CARDIAC ELECTROPHYSIOLOGY  PROCEDURE N/A 9/7/2018    Procedure: Ablation SVT;  Surgeon: Benito Chavira MD;  Location: [facility] INVASIVE LOCATION;  Service: Cardiovascular   • CARDIOVASCULAR STRESS TEST  05/22/2003    P. Myoview- Normal. Pt had CP   • CARDIOVASCULAR STRESS TEST  03/12/2013    L. Myoview- Pt had CP. Negative   • CONVERTED (HISTORICAL) CARDIOVASCULAR STUDIES  01/22/2014    MUGA- EF 45%   • CONVERTED (HISTORICAL) HOLTER  10/10/2002    Holter- AVG HR 70 BPM   • ECHO - CONVERTED  09/15/2003    Echo- ([facility]) EF 55%. LBBB. RVSP- 42 mmHg   • ECHO - CONVERTED  03/12/2013    Echo- EF 60%. BRIE- 1.6 mmHg. RVSP- 42 mmHg   • ECHO - CONVERTED  10/15/2013    Echo-EF 35%, mod. MR, & AI. BRIE- 1.4 Cm2. RVSP- 40 mmHg   • ECHO - CONVERTED  06/09/2015    Echo- EF 45-50%, mod MR, Mod AS, BRIE 1.1 Cm2, Mod AR, RVSP- 35 mmHg   • ECHO - CONVERTED  02/21/2017    EF 55-60%, BRIE 1.0   • ECHO - CONVERTED  12/15/2017    @[facility]. EF 35-40%. LBBB. Mild- Mod MR   • ECHO - CONVERTED  05/01/2018    EF 45-50%. Mod MR. RVSP- 35 mmHg   • ECHO - CONVERTED  07/22/2019    EF 50-55%.AVR. BRIE- 1.4 Cm2, Mild MR. RVSP- 29 mmHg.   • PACEMAKER IMPLANTATION  12/19/2017    [employer] BIV PPM by Dr. Dickey   • MARYSE  09/06/2013    MARYSE- < 35%, Moderate AS. AI and MR. No Thrombus     Current Outpatient Medications   Medication Sig Dispense Refill   • acetaminophen (TYLENOL) 325 MG tablet Take 650 mg by mouth Every 6 (Six) Hours As Needed for Mild Pain .     • aspirin 81 MG EC tablet Take 81 mg by mouth daily.     • calcium carbonate (OS-PABLO) 600 MG tablet Take 600 mg by mouth Daily. CALCIUM 600 MG AND VITAMIN D3 800 IU     • carvedilol (COREG) 6.25 MG tablet Take 1 tablet by mouth 2 (Two) Times a Day. 180 tablet 3   • Cyanocobalamin (VITAMIN B12 PO) 1,500 mcg. 1/2 tablet daily     • docusate sodium (COLACE) 100 MG capsule Take 100 mg by mouth 2 (Two) Times a Day As Needed.     • ferrous sulfate 324 (65 Fe) MG tablet delayed-release EC tablet Take 324 mg by mouth Daily With  Breakfast.     • furosemide (LASIX) 20 MG tablet Take 1 tablet by mouth Daily. 90 tablet 3   • ipratropium (ATROVENT) 0.03 % nasal spray 4 sprays into each nostril 2 (Two) Times a Day As Needed for Rhinitis.     • levothyroxine (SYNTHROID, LEVOTHROID) 88 MCG tablet Take 88 mcg by mouth Daily.     • lisinopril (PRINIVIL,ZESTRIL) 5 MG tablet Take 1 tablet by mouth Daily. 90 tablet 3   • Multiple Vitamin (MULTI VITAMIN DAILY PO) Take 1 tablet by mouth Daily.     • Omega 3 1000 MG capsule Take 1 tablet by mouth 2 (Two) Times a Day.     • potassium chloride 10 MEQ CR tablet Take 1 tablet by mouth Daily. 90 tablet 3   • pravastatin (PRAVACHOL) 40 MG tablet Take 1 tablet by mouth Daily. 90 tablet 3   • risperiDONE (RISPERDAL) 4 MG tablet Take 4 mg by mouth Every Night.     • rivaroxaban (XARELTO) 20 MG tablet Take 1 tablet by mouth Daily. (Please hold until patient requests) 90 tablet 3   • vitamin C (ASCORBIC ACID) 500 MG tablet Take 500 mg by mouth Daily.       No current facility-administered medications for this visit.      Codeine, Penicillins, and Sulfa antibiotics    Past Medical History:   Diagnosis Date   • Aortic valve stenosis    • Bipolar affective disorder (CMS/HCC)    • CHF (congestive heart failure) (CMS/HCC)    • Diabetes mellitus (CMS/HCC)     DX'D TYPE II NIDDM APPROX 3-4 YEARS AGO, NO MEDS, CHECKS BLOOD SUGAR 2-3 TIMES PER WEEK, AVERAGE    • CLAYTON (generalized anxiety disorder)    • H/O dilation and curettage    • H/O eye surgery     Eye surgery- bilateral   • H/O:     • H/O: hysterectomy     s/p appendectomy   • Hallucinations    • History of AAA (abdominal aortic aneurysm) repair     (pt doesn't recall- on PCP note)   • Hyperlipidemia    • Hypertension    • Hypothyroidism    • Overactive bladder    • Stroke (CMS/HCC)     TIA 3 YEARS AGO, MEMORY LOSS    • SVT (supraventricular tachycardia) (CMS/HCC)    • Tachycardia    • Wears dentures     PARTIALS ON TOP AND BOTTOM    • Wears glasses   "    Social History     Socioeconomic History   • Marital status:      Spouse name: Not on file   • Number of children: 1   • Years of education: Not on file   • Highest education level: Not on file   Occupational History   • Occupation: RETIRED    Tobacco Use   • Smoking status: Never Smoker   • Smokeless tobacco: Never Used   Substance and Sexual Activity   • Alcohol use: Never     Frequency: Never   • Drug use: No   • Sexual activity: Defer   Social History Narrative    Lives in [place], [place] with .     Family History   Problem Relation Age of Onset   • Heart disease Mother    • Diabetes Mother    • Cancer Father         Bone     Review of Systems   Constitution: Positive for weight gain (Up 4 pounds). Negative for decreased appetite and malaise/fatigue.   HENT: Negative.    Eyes: Negative for blurred vision.   Cardiovascular: Negative for chest pain, dyspnea on exertion, leg swelling, palpitations and syncope.   Respiratory: Negative for shortness of breath and sleep disturbances due to breathing.    Endocrine: Negative.    Hematologic/Lymphatic: Negative for bleeding problem. Does not bruise/bleed easily.   Skin: Negative.    Musculoskeletal: Negative for falls and myalgias.   Gastrointestinal: Negative for abdominal pain, heartburn and melena.   Genitourinary: Negative for hematuria.   Neurological: Negative for dizziness and light-headedness.   Psychiatric/Behavioral: Negative for altered mental status. The patient is nervous/anxious.    Allergic/Immunologic: Negative.       Objective     /70 (BP Location: Left arm)   Pulse 83   Temp 97.8 °F (36.6 °C)   Ht 154.9 cm (60.98\")   Wt 56.5 kg (124 lb 9.6 oz)   BMI 23.56 kg/m²     Vitals signs and nursing note reviewed.   Constitutional:       General: Not in acute distress.     Appearance: Well-developed. Not diaphoretic.   Eyes:      Pupils: Pupils are equal, round, and reactive to light.   HENT:      Head: Normocephalic.   Neck: "      Musculoskeletal: Normal range of motion.   Pulmonary:      Effort: Pulmonary effort is normal. No respiratory distress.      Breath sounds: Normal breath sounds.   Cardiovascular:      Normal rate. Regular rhythm.      Murmurs: There is a grade 2/6 systolic murmur at the apex.   Pulses:     Intact distal pulses.   Edema:     Feet: bilateral 1+ edema of the feet.  Abdominal:      General: Bowel sounds are normal.      Palpations: Abdomen is soft.   Musculoskeletal: Normal range of motion.   Skin:     General: Skin is warm and dry.   Neurological:      Mental Status: Alert and oriented to person, place, and time.          ECG 12 Lead    Date/Time: 2021 2:07 PM  Performed by: Lakia Prajapati APRN  Authorized by: Lakia Prajapati APRN   Comparison: compared with previous ECG from 2020  Similar to previous ECG  Rhythm: sinus rhythm and paced  Rate: normal  BPM: 83  ST Segments: ST segments normal  Pacin% capture and ventricular pacing  Clinical impression: abnormal EKG  Comments: Atrial sensing, ventricular pacing                Problem List Items Addressed This Visit        Other    AS (aortic stenosis) - Primary    Relevant Medications    carvedilol (COREG) 6.25 MG tablet    S/P AVR    Overview     11/10/17 Dr Darling         Paroxysmal atrial fibrillation (CMS/HCC)    Relevant Medications    carvedilol (COREG) 6.25 MG tablet    NICM (nonischemic cardiomyopathy) (CMS/HCC)    Relevant Medications    carvedilol (COREG) 6.25 MG tablet    Typical atrial flutter (CMS/HCC)    Relevant Medications    carvedilol (COREG) 6.25 MG tablet    Essential hypertension    Relevant Medications    lisinopril (PRINIVIL,ZESTRIL) 5 MG tablet    carvedilol (COREG) 6.25 MG tablet    furosemide (LASIX) 20 MG tablet    Hypercholesteremia    Relevant Medications    pravastatin (PRAVACHOL) 40 MG tablet    Presence of biventricular cardiac pacemaker    Relevant Orders    ECG 12 Lead         1.  Aortic stenosis-status post aortic  valve replacement, 2017.  Echocardiogram 7/2019 showed stable valve.  Patient's  requested echo be delayed until July 2021 when she returns for follow-up due to Covid risk.  He will call 2 to 3 weeks prior to visit an order can be placed for follow-up echo.    2.  Arrhythmia/PAF/flutter-s/p AV ashley ablation, BiV pacemaker placed.  Managed by Dr. Chavira.  EKG today showed atrial sensing, ventricular pacing at 83 bpm.  Continue carvedilol, Xarelto.    3.  Nonischemic cardiomyopathy/LBBB-EF improved to 55% after BiV pacing and medical therapy.  Continue carvedilol, lisinopril.  Continue low-dose Lasix.  She can take extra dose of Lasix 20 mg as needed for weight gain or lower extremity edema.    4.  Hyperlipidemia-managed with pravastatin, tolerates well.    5.  HTN-well-controlled at 120/70, continue Coreg, lisinopril, Lasix.  Limit sodium.    Refill sent for cardiac meds.  Could we get a copy of her most recent labs?  She appears stable.  We will see her back in 6 months.    Patient's Body mass index is 23.56 kg/m². BMI is within normal parameters. No follow-up required..               Electronically signed by TAYO Deshpande,  January 18, 2021 16:47 EST

## 2021-02-08 ENCOUNTER — TELEPHONE (OUTPATIENT)
Dept: CARDIOLOGY | Facility: CLINIC | Age: 81
End: 2021-02-08

## 2021-02-08 NOTE — TELEPHONE ENCOUNTER
" called stating \"Debbie has had 3 episodes of feeling faint since last visit, I checked B/P during one of those episodes and B/P 95/59 with HR 70s. She has no dizziness and no other problems but it worries me\".     Patient has been taking extra dose of Lasix daily instead of PRN. Advised to decrease Lasix to 20mg daily and only take the extra dose of lasix as needed for edema or weight gain. He has been weighing her daily with readings of 125lb, 124lb, 124lb and 125lb with extra dose of lasix.  "

## 2021-02-08 NOTE — TELEPHONE ENCOUNTER
Bipin made aware of recommendations for lasix 20mg daily, only use extra dose prn, if B/P remains low, less than 100 systolic cut lisinopril in half, increase fluid intake, if this does not help, call office back, Bipin verbalized understanding.

## 2021-02-08 NOTE — TELEPHONE ENCOUNTER
BP is a little low.  Agree to cut back on Lasix.     EF is normal.  She does have ankle edema.  Lasix 20 mg daily.  If BP remains low, less than 100 systolic, recommend cutting lisinopril in half.  Increase fluid intake.    If this does not help, call us back.

## 2021-06-04 ENCOUNTER — OFFICE VISIT (OUTPATIENT)
Dept: CARDIOLOGY | Facility: CLINIC | Age: 81
End: 2021-06-04

## 2021-06-04 VITALS
HEIGHT: 64 IN | BODY MASS INDEX: 21.17 KG/M2 | HEART RATE: 88 BPM | WEIGHT: 124 LBS | OXYGEN SATURATION: 94 % | SYSTOLIC BLOOD PRESSURE: 114 MMHG | DIASTOLIC BLOOD PRESSURE: 62 MMHG

## 2021-06-04 DIAGNOSIS — Z79.01 CHRONIC ANTICOAGULATION: ICD-10-CM

## 2021-06-04 DIAGNOSIS — I48.0 PAROXYSMAL ATRIAL FIBRILLATION (HCC): ICD-10-CM

## 2021-06-04 DIAGNOSIS — I42.8 NICM (NONISCHEMIC CARDIOMYOPATHY) (HCC): Primary | ICD-10-CM

## 2021-06-04 DIAGNOSIS — I49.5 SSS (SICK SINUS SYNDROME) (HCC): ICD-10-CM

## 2021-06-04 DIAGNOSIS — I10 ESSENTIAL HYPERTENSION: ICD-10-CM

## 2021-06-04 DIAGNOSIS — Z95.0 PRESENCE OF BIVENTRICULAR CARDIAC PACEMAKER: ICD-10-CM

## 2021-06-04 DIAGNOSIS — I44.7 LBBB (LEFT BUNDLE BRANCH BLOCK): ICD-10-CM

## 2021-06-04 PROBLEM — J30.9 ALLERGIC RHINITIS: Status: ACTIVE | Noted: 2021-06-04

## 2021-06-04 PROCEDURE — 93281 PM DEVICE PROGR EVAL MULTI: CPT | Performed by: NURSE PRACTITIONER

## 2021-06-04 PROCEDURE — 99214 OFFICE O/P EST MOD 30 MIN: CPT | Performed by: NURSE PRACTITIONER

## 2021-06-04 NOTE — PROGRESS NOTES
Cardiac Electrophysiology Outpatient Follow Up Note            [facility] at [facility]    Follow Up Office Visit      Debbie Rodrigez  [id number]  06/04/2021    Primary Care Physician: Reagan Bhatti MD    Referred By: No ref. provider found    Subjective     Chief Complaint:   Diagnoses and all orders for this visit:    1. NICM (nonischemic cardiomyopathy) (CMS/HCC) (Primary)    2. Paroxysmal atrial fibrillation (CMS/HCC)    3. SSS (sick sinus syndrome) (CMS/HCC)    4. Presence of biventricular cardiac pacemaker    5. LBBB (left bundle branch block)    6. Essential hypertension    7. Chronic anticoagulation      Chief Complaint   Patient presents with   • paf     Problem List:   1.  NICM:    A.  Left heart catheterization 9/11/2002: EF 30%, normal coronary arteries  Myoview stress test 5/22/2003: Normal stress test, data deficit  C.  Echocardiogram 9/15/2003: EF 55%, RVSP 42 mmHg, data deficit  D. Echocardiogram 3/12/2013 EF 60%, RVSP 42 mmHg  E. Myoview stress test 3/12/2013 negative for ischemia  F. Echocardiogram 10/15/2013: EF 35%, moderate MR, moderate AI, RVSP 40 mmHg  G. Transesophageal echocardiogram 9/6/2013 EF less than 35%, moderate AF, AI, and MR  H. MUGA scan 1/22/2014: EF 62%  I.  Echocardiogram 6/9/2015: EF 45-50%, moderate MR, moderate AF, moderate AI, RVSP 35 mmHg   J. Echocardiogram 2/21/2017: EF 55-60%  K. Echocardiogram 8/30/17: EF 40-45%, moderate to severe AS, moderate AI, mild MR, RVSP 38 mm Hg  L. Left heart catheterization 10/2017: Normal coronary arteries, significant AS and AI with AVR recommended  M. Echocardiogram 12/15/2017: EF 35-40%, left bundle branch block, mild to moderate MR  N. S/p BSC Bi-V PM with Dr. Dickey 12/19/17  O. Echocardiogram 5/1/18: EF 45-50%, moderate MR, RVSP 35 mmHg, mild LVH  P. Echocardiogram 7/22/19: EF 51-55%, prosthetic aortic valve, mild to moderate AS, mild MR/TR    2. SVT/Atrial Flutter   A. Noted on device  interrogation 2018  B. Typical Atrial Flutter Ablation 18    3. Atrial fibrillation:    A. Developed postoperatively after tissue aortic valve replacement in November, initiated on flecainide and Xarelto  B. 7 day Holter monitor 2018:16.3% A. fib burden, average heart rate 90 bpm, minimum 51 bpm,         maximum 202 bpm   C. AVN ablation 18    4. Valvular heart disease:  A. Aortic stenosis s/p tissue AVR 11/10/17 with Dr. Darling, EF during intra-operative MARYSE showing EF 60%    5. Tachybrady syndrome:  A. S/p BSC BI-V PM with Dr. Dickey 17    6. Hypertension  7. Hyperlipidemia  8. Hypothyroidism  9. Diabetes mellitus, type II  10. Bipolar disorder  11. TIA  12. Generalized anxiety disorder  13. Surgical history:              A. D&C              B.  section              C. Hysterectomy              D. Appendectomy              E. Eye surgery       History of Present Illness:   Debbie Rodrigez is a 80 y.o. female who presents to my electrophysiology clinic for follow up of nonischemic cardiomyopathy and paroxysmal atrial fibrillation with tachybradycardia syndrome status post biventricular permanent pacemaker implant in the past as well as AV node ablation.  Upon evaluation patient reports doing well overall from a cardiovascular standpoint.  Patient denies chest pain, palpitations, dizziness, presyncope, or syncope.  Patient admits compliance to anticoagulation therapy without noted side effects, signs of bleeding, or TIA/strokelike symptoms.  Most recent echocardiogram reveals recovered EF 50-55%.  Patient's blood pressure is acceptable in office today with reported control on current medical therapy at home with provided daily list of blood pressures provided by her  at home.  Patient denies unusual fatigue, shortness of breath, orthopnea, PND, bilateral lower extremity edema.    Review of Systems:   Constitutional: No fevers or chills, no recent weight gain or weight loss or  fatigue  Eyes: No visual loss, blurred vision, double vision, yellow sclerae.  ENT: No headaches, hearing loss, vertigo, congestion or sore throat.   Cardiovascular: Per HPI  Respiratory: No cough or wheezing, no sputum production, no hematemesis   Gastrointestinal: No abdominal pain, no nausea, vomiting, constipation, diarrhea, melena.   Genitourinary: No dysuria, hematuria or increased frequency.  Musculoskeletal:  No gait disturbance, weakness or joint pain or stiffness  Integumentary: No rashes, urticaria, ulcers or sores.   Neurological: No headache, dizziness, syncope, paralysis, ataxia  Psychiatric: No anxiety, or depression  Endocrine: No diaphoresis, cold or heat intolerance. No polyuria or polydipsia.   Hematologic/Lymphatic: No anemia, abnormal bruising or bleeding.       Past Medical History:   Past Medical History:   Diagnosis Date   • Aortic valve stenosis    • Bipolar affective disorder (CMS/[facility])    • CHF (congestive heart failure) (CMS/[facility])    • Diabetes mellitus (CMS/[facility])     DX'D TYPE II NIDDM APPROX 3-4 YEARS AGO, NO MEDS, CHECKS BLOOD SUGAR 2-3 TIMES PER WEEK, AVERAGE    • CLAYTON (generalized anxiety disorder)    • H/O dilation and curettage    • H/O eye surgery     Eye surgery- bilateral   • H/O:     • H/O: hysterectomy     s/p appendectomy   • Hallucinations    • History of AAA (abdominal aortic aneurysm) repair     (pt doesn't recall- on PCP note)   • Hyperlipidemia    • Hypertension    • Hypothyroidism    • Overactive bladder    • Stroke (CMS/[facility])     TIA 3 YEARS AGO, MEMORY LOSS    • SVT (supraventricular tachycardia) (CMS/[facility])    • Tachycardia    • Wears dentures     PARTIALS ON TOP AND BOTTOM    • Wears glasses        Past Surgical History:   Past Surgical History:   Procedure Laterality Date   • AORTIC VALVE REPAIR/REPLACEMENT N/A 11/10/2017    Procedure: AORTIC VALVE REPAIR/REPLACEMENT WITH MARYSE;  Surgeon: Dmitry Darling MD;  Location: [facility];  Service:    • CARDIAC  CATHETERIZATION  09/11/2002    Cath- EF 30% Normal Coronaries   • CARDIAC CATHETERIZATION  10/20/2017    Normal Coronaries. Sig AI. Unable to cross AV   • CARDIAC ELECTROPHYSIOLOGY PROCEDURE N/A 9/7/2018    Procedure: Ablation SVT;  Surgeon: Benito Chavira MD;  Location: [facility] INVASIVE LOCATION;  Service: Cardiovascular   • CARDIOVASCULAR STRESS TEST  05/22/2003    P. Myoview- Normal. Pt had CP   • CARDIOVASCULAR STRESS TEST  03/12/2013    L. Myoview- Pt had CP. Negative   • CONVERTED (HISTORICAL) CARDIOVASCULAR STUDIES  01/22/2014    MUGA- EF 45%   • CONVERTED (HISTORICAL) HOLTER  10/10/2002    Holter- AVG HR 70 BPM   • ECHO - CONVERTED  09/15/2003    Echo- (LCR) EF 55%. LBBB. RVSP- 42 mmHg   • ECHO - CONVERTED  03/12/2013    Echo- EF 60%. BRIE- 1.6 mmHg. RVSP- 42 mmHg   • ECHO - CONVERTED  10/15/2013    Echo-EF 35%, mod. MR, & AI. BRIE- 1.4 Cm2. RVSP- 40 mmHg   • ECHO - CONVERTED  06/09/2015    Echo- EF 45-50%, mod MR, Mod AS, BRIE 1.1 Cm2, Mod AR, RVSP- 35 mmHg   • ECHO - CONVERTED  02/21/2017    EF 55-60%, BRIE 1.0   • ECHO - CONVERTED  12/15/2017    @[facility]. EF 35-40%. LBBB. Mild- Mod MR   • ECHO - CONVERTED  05/01/2018    EF 45-50%. Mod MR. RVSP- 35 mmHg   • ECHO - CONVERTED  07/22/2019    EF 50-55%.AVR. BRIE- 1.4 Cm2, Mild MR. RVSP- 29 mmHg.   • PACEMAKER IMPLANTATION  12/19/2017    [facility]er BIV PPM by Dr. Dickey   • MARYSE  09/06/2013    MARYSE- < 35%, Moderate AS. AI and MR. No Thrombus       Family History:   Family History   Problem Relation Age of Onset   • Heart disease Mother    • Diabetes Mother    • Cancer Father         Bone       Social History:   Social History     Socioeconomic History   • Marital status:      Spouse name: Not on file   • Number of children: 1   • Years of education: Not on file   • Highest education level: Not on file   Tobacco Use   • Smoking status: Never Smoker   • Smokeless tobacco: Never Used   Substance and Sexual Activity   • Alcohol use: Never   • Drug use: No   •  Sexual activity: Defer       Medications:     Current Outpatient Medications:   •  acetaminophen (TYLENOL) 325 MG tablet, Take 650 mg by mouth Every 6 (Six) Hours As Needed for Mild Pain ., Disp: , Rfl:   •  aspirin 81 MG EC tablet, Take 81 mg by mouth daily., Disp: , Rfl:   •  calcium carbonate (OS-PABLO) 600 MG tablet, Take 600 mg by mouth Daily. CALCIUM 600 MG AND VITAMIN D3 800 IU, Disp: , Rfl:   •  carvedilol (COREG) 6.25 MG tablet, Take 1 tablet by mouth 2 (Two) Times a Day., Disp: 180 tablet, Rfl: 3  •  Cyanocobalamin (VITAMIN B12 PO), 1,500 mcg. 1/2 tablet daily, Disp: , Rfl:   •  docusate sodium (COLACE) 100 MG capsule, Take 100 mg by mouth 2 (Two) Times a Day As Needed., Disp: , Rfl:   •  ferrous sulfate 324 (65 Fe) MG tablet delayed-release EC tablet, Take 324 mg by mouth Daily With Breakfast., Disp: , Rfl:   •  furosemide (LASIX) 20 MG tablet, Take 1 tablet by mouth Daily., Disp: 90 tablet, Rfl: 3  •  ipratropium (ATROVENT) 0.03 % nasal spray, 4 sprays into each nostril 2 (Two) Times a Day As Needed for Rhinitis., Disp: , Rfl:   •  levothyroxine (SYNTHROID, LEVOTHROID) 88 MCG tablet, Take 88 mcg by mouth Daily., Disp: , Rfl:   •  lisinopril (PRINIVIL,ZESTRIL) 5 MG tablet, Take 1 tablet by mouth Daily. (Patient taking differently: Take 2.5 mg by mouth Daily.), Disp: 90 tablet, Rfl: 3  •  Multiple Vitamin (MULTI VITAMIN DAILY PO), Take 1 tablet by mouth Daily., Disp: , Rfl:   •  Omega 3 1000 MG capsule, Take 1 tablet by mouth 2 (Two) Times a Day., Disp: , Rfl:   •  potassium chloride 10 MEQ CR tablet, Take 1 tablet by mouth Daily., Disp: 90 tablet, Rfl: 3  •  pravastatin (PRAVACHOL) 40 MG tablet, Take 1 tablet by mouth Daily., Disp: 90 tablet, Rfl: 3  •  risperiDONE (RISPERDAL) 4 MG tablet, Take 4 mg by mouth Every Night., Disp: , Rfl:   •  rivaroxaban (XARELTO) 20 MG tablet, Take 1 tablet by mouth Daily. (Please hold until patient requests), Disp: 90 tablet, Rfl: 3  •  vitamin C (ASCORBIC ACID) 500 MG  "tablet, Take 500 mg by mouth Daily., Disp: , Rfl:     Allergies:   Allergies   Allergen Reactions   • Codeine GI Intolerance   • Penicillin V Potassium Nausea And Vomiting   • Sulfamethoxazole-Trimethoprim Nausea And Vomiting   • Penicillins Rash   • Sulfa Antibiotics Rash       Objective   Vital Signs:   Vitals:    06/04/21 1337   BP: 114/62   BP Location: Left arm   Patient Position: Sitting   Pulse: 88   SpO2: 94%   Weight: 56.2 kg (124 lb)   Height: 162.6 cm (64\")       PHYSICAL EXAM  General appearance: Awake, alert, cooperative  Head: Normocephalic, without obvious abnormality, atraumatic  Eyes: Conjunctivae/corneas clear, EOMs intact  Neck: no adenopathy, no carotid bruit, no JVD and thyroid: not enlarged  Lungs: clear to auscultation bilaterally and no rhonchi or crackles\", ' symmetric  Heart: regular rate and rhythm, S1, S2 normal, no murmur, click, rub or gallop  Abdomen: Soft, non-tender, bowel sounds normal,  no organomegaly  Extremities: extremities normal, atraumatic, no cyanosis or edema  Skin: Skin color, turgor normal, no rashes or lesions  Neurologic: Grossly normal     Lab Results   Component Value Date    GLUCOSE 85 09/06/2018    CALCIUM 9.4 09/06/2018     09/06/2018    K 5.0 09/06/2018    CO2 23.0 09/06/2018     09/06/2018    BUN 24 (H) 09/06/2018    CREATININE 0.92 09/06/2018    EGFRIFNONA 59 (L) 09/06/2018    BCR 26.1 (H) 09/06/2018    ANIONGAP 7.0 09/06/2018     Lab Results   Component Value Date    WBC 9.26 09/06/2018    HGB 12.9 09/06/2018    HCT 38.9 09/06/2018    MCV 95.6 09/06/2018     09/06/2018     Lab Results   Component Value Date    INR 1.05 09/06/2018    INR 1.04 11/19/2017    INR 1.23 11/11/2017    PROTIME 11.0 09/06/2018    PROTIME 11.4 11/19/2017    PROTIME 13.5 (H) 11/11/2017       Assessment & Plan    Diagnoses and all orders for this visit:    1. NICM (nonischemic cardiomyopathy) (CMS/HCC) (Primary)    2. Paroxysmal atrial fibrillation (CMS/HCC)    3. SSS " (sick sinus syndrome) (CMS/[facility])    4. Presence of biventricular cardiac pacemaker    5. LBBB (left bundle branch block)    6. Essential hypertension    7. Chronic anticoagulation         Diagnosis Plan   1. NICM (nonischemic cardiomyopathy) (CMS/[facility])  .  Stable NYHA class II maintained on guideline directed medical therapy with reported compliance and without noted side effects.  Patient denies chest pain, palpitations, dizziness, presyncope, or syncope.  Patient maintained on Coreg, lisinopril, and furosemide therapy.  We will continue current medical therapy as prescribed and follow-up in 6 months for reevaluation.     2. Paroxysmal atrial fibrillation (CMS/[facility])  .  Patient status post AV node ablation with reported compliance to anticoagulation therapy without reports of palpitations.       3. SSS (sick sinus syndrome) (CMS/[facility])  .  Status post biventricular pacemaker implant with noted normal function and device interrogation in office today.     4. Presence of biventricular cardiac pacemaker  .  Device interrogation: [employer] biventricular pacemaker at DDDR 70-1 20, RA paced 23%, RV/LV paced 100%, acceptable lead threshold and impedance, 7 years remaining on battery.  12 months which is up to 1 hour in duration noted with patient asymptomatic.      This patient's Cardiac Implanted Electronic Device was manually interrogated and reprogrammed during the patient encounter today.  Iterative programming changes were manually made to determine the sensing threshold, pacing threshold, lead impedance as well as underlying cardiac rhythm.  These programming changes were not limited to but included some or all of the following when appropriate: pacing mode, programmed AV delays, blanking periods, and refractory periods.  Data obtained as a result of these manual programing changes informed the patient's CIED permanent programming.     5. LBBB (left bundle branch block)  .  Status post biventricular CRT-P device  implant with noted normal function on device interrogation in office today.     6. Essential hypertension  .  Controlled in office today with reported control on current medical therapy at home.  Continue Coreg, lisinopril, and furosemide therapy as prescribed and follow-up in 6 months for reevaluation.     7. Chronic anticoagulation  .  Patient tolerating Xarelto therapy with reported compliance and without noted side effects, signs of bleeding, or TIA/strokelike symptoms.    Tinea Xarelto 20 mg daily     Body mass index is 21.28 kg/m².    I spent 20 minutes in consultation with this patient which included more than 65% of this time in direct face-to-face counseling, physical examination and discussion of my assessment and findings and shared decision making with the patient.  The remainder of the time not spent face to face was performing one, some or all of the following actions:  preparing to see this patient ( eg. Review of tests),  ordering medications, tests or procedures ), care coordination, discussion of the plan with other healthcare providers, documenting clinical information in Epic well as independently interpreting results and communicating results to patient, family and or caregiver.  All time noted occurred on the date of service.    Follow Up: 6-month follow-up with [employer] device check      Thank you for allowing me to participate in the care of your patient. Please to not hesitate to contact me with additional questions or concerns.      Electronically signed by TAYO Hoyos, 06/04/21, 2:03 PM EDT.   [facility] / [facility]

## 2021-07-06 ENCOUNTER — TELEPHONE (OUTPATIENT)
Dept: CARDIOLOGY | Facility: CLINIC | Age: 81
End: 2021-07-06

## 2021-07-06 DIAGNOSIS — I35.0 AORTIC VALVE STENOSIS, ETIOLOGY OF CARDIAC VALVE DISEASE UNSPECIFIED: Primary | ICD-10-CM

## 2021-07-06 DIAGNOSIS — I42.8 NICM (NONISCHEMIC CARDIOMYOPATHY) (HCC): ICD-10-CM

## 2021-07-06 DIAGNOSIS — Z95.0 PRESENCE OF BIVENTRICULAR CARDIAC PACEMAKER: ICD-10-CM

## 2021-07-06 DIAGNOSIS — Z95.2 S/P AVR: ICD-10-CM

## 2021-07-06 NOTE — TELEPHONE ENCOUNTER
called to inform patient has appointment 7/19/21, requesting order for echo prior to visit.    Per last office note, patient was to call prior to next office visit for echo.

## 2021-07-15 ENCOUNTER — HOSPITAL ENCOUNTER (OUTPATIENT)
Dept: CARDIOLOGY | Facility: HOSPITAL | Age: 81
Discharge: HOME | End: 2021-07-15
Admitting: NURSE PRACTITIONER

## 2021-07-15 DIAGNOSIS — I42.8 NICM (NONISCHEMIC CARDIOMYOPATHY) (HCC): ICD-10-CM

## 2021-07-15 DIAGNOSIS — Z95.2 S/P AVR: ICD-10-CM

## 2021-07-15 DIAGNOSIS — Z95.0 PRESENCE OF BIVENTRICULAR CARDIAC PACEMAKER: ICD-10-CM

## 2021-07-15 DIAGNOSIS — I35.0 AORTIC VALVE STENOSIS, ETIOLOGY OF CARDIAC VALVE DISEASE UNSPECIFIED: ICD-10-CM

## 2021-07-15 LAB
BH CV ECHO MEAS - ACS: 1.2 CM
BH CV ECHO MEAS - AO MAX PG (FULL): 9.8 MMHG
BH CV ECHO MEAS - AO MAX PG: 11.7 MMHG
BH CV ECHO MEAS - AO MEAN PG (FULL): 6 MMHG
BH CV ECHO MEAS - AO MEAN PG: 7 MMHG
BH CV ECHO MEAS - AO ROOT AREA (BSA CORRECTED): 1.5
BH CV ECHO MEAS - AO ROOT AREA: 4.7 CM^2
BH CV ECHO MEAS - AO ROOT DIAM: 2.5 CM
BH CV ECHO MEAS - AO V2 MAX: 171.3 CM/SEC
BH CV ECHO MEAS - AO V2 MEAN: 131 CM/SEC
BH CV ECHO MEAS - AO V2 VTI: 38.5 CM
BH CV ECHO MEAS - AVA(I,A): 1.1 CM^2
BH CV ECHO MEAS - AVA(I,D): 1.2 CM^2
BH CV ECHO MEAS - AVA(V,A): 1.1 CM^2
BH CV ECHO MEAS - AVA(V,D): 1.1 CM^2
BH CV ECHO MEAS - BSA(HAYCOCK): 1.6 M^2
BH CV ECHO MEAS - BSA: 1.6 M^2
BH CV ECHO MEAS - BZI_BMI: 21.3 KILOGRAMS/M^2
BH CV ECHO MEAS - BZI_METRIC_HEIGHT: 162.6 CM
BH CV ECHO MEAS - BZI_METRIC_WEIGHT: 56.2 KG
BH CV ECHO MEAS - EDV(CUBED): 64.5 ML
BH CV ECHO MEAS - EDV(MOD-SP4): 44.6 ML
BH CV ECHO MEAS - EDV(TEICH): 70.4 ML
BH CV ECHO MEAS - EF(CUBED): 69.5 %
BH CV ECHO MEAS - EF(MOD-SP4): 44.6 %
BH CV ECHO MEAS - EF(TEICH): 61.6 %
BH CV ECHO MEAS - EF_3D-VOL: 51 %
BH CV ECHO MEAS - ESV(CUBED): 19.7 ML
BH CV ECHO MEAS - ESV(MOD-SP4): 24.7 ML
BH CV ECHO MEAS - ESV(TEICH): 27 ML
BH CV ECHO MEAS - FS: 32.7 %
BH CV ECHO MEAS - IVS/LVPW: 0.84
BH CV ECHO MEAS - IVSD: 0.99 CM
BH CV ECHO MEAS - LA DIMENSION: 3.7 CM
BH CV ECHO MEAS - LA/AO: 1.5
BH CV ECHO MEAS - LV DIASTOLIC VOL/BSA (35-75): 27.9 ML/M^2
BH CV ECHO MEAS - LV IVRT: 0.12 SEC
BH CV ECHO MEAS - LV MASS(C)D: 144.7 GRAMS
BH CV ECHO MEAS - LV MASS(C)DI: 90.6 GRAMS/M^2
BH CV ECHO MEAS - LV MAX PG: 1.9 MMHG
BH CV ECHO MEAS - LV MEAN PG: 1 MMHG
BH CV ECHO MEAS - LV SYSTOLIC VOL/BSA (12-30): 15.5 ML/M^2
BH CV ECHO MEAS - LV V1 MAX: 69 CM/SEC
BH CV ECHO MEAS - LV V1 MEAN: 49.3 CM/SEC
BH CV ECHO MEAS - LV V1 VTI: 15.2 CM
BH CV ECHO MEAS - LVIDD: 4 CM
BH CV ECHO MEAS - LVIDS: 2.7 CM
BH CV ECHO MEAS - LVLD AP4: 5.5 CM
BH CV ECHO MEAS - LVLS AP4: 5.5 CM
BH CV ECHO MEAS - LVOT AREA (M): 2.8 CM^2
BH CV ECHO MEAS - LVOT AREA: 2.8 CM^2
BH CV ECHO MEAS - LVOT DIAM: 1.9 CM
BH CV ECHO MEAS - LVPWD: 1.2 CM
BH CV ECHO MEAS - MR MAX PG: 108.6 MMHG
BH CV ECHO MEAS - MR MAX VEL: 521 CM/SEC
BH CV ECHO MEAS - MV A MAX VEL: 87.8 CM/SEC
BH CV ECHO MEAS - MV DEC SLOPE: 510.5 CM/SEC^2
BH CV ECHO MEAS - MV E MAX VEL: 109 CM/SEC
BH CV ECHO MEAS - MV E/A: 1.2
BH CV ECHO MEAS - MV MAX PG: 4.8 MMHG
BH CV ECHO MEAS - MV MEAN PG: 2 MMHG
BH CV ECHO MEAS - MV P1/2T MAX VEL: 110 CM/SEC
BH CV ECHO MEAS - MV P1/2T: 63.1 MSEC
BH CV ECHO MEAS - MV V2 MAX: 110 CM/SEC
BH CV ECHO MEAS - MV V2 MEAN: 66.8 CM/SEC
BH CV ECHO MEAS - MV V2 VTI: 30.8 CM
BH CV ECHO MEAS - MVA P1/2T LCG: 2 CM^2
BH CV ECHO MEAS - MVA(P1/2T): 3.5 CM^2
BH CV ECHO MEAS - MVA(VTI): 1.4 CM^2
BH CV ECHO MEAS - RAP SYSTOLE: 10 MMHG
BH CV ECHO MEAS - RVDD: 2 CM
BH CV ECHO MEAS - RVSP: 36.4 MMHG
BH CV ECHO MEAS - SI(AO): 113.7 ML/M^2
BH CV ECHO MEAS - SI(CUBED): 28.1 ML/M^2
BH CV ECHO MEAS - SI(LVOT): 27 ML/M^2
BH CV ECHO MEAS - SI(MOD-SP4): 12.5 ML/M^2
BH CV ECHO MEAS - SI(TEICH): 27.2 ML/M^2
BH CV ECHO MEAS - SV(AO): 181.5 ML
BH CV ECHO MEAS - SV(CUBED): 44.8 ML
BH CV ECHO MEAS - SV(LVOT): 43.1 ML
BH CV ECHO MEAS - SV(MOD-SP4): 19.9 ML
BH CV ECHO MEAS - SV(TEICH): 43.4 ML
BH CV ECHO MEAS - TR MAX VEL: 257 CM/SEC
MAXIMAL PREDICTED HEART RATE: 141 BPM
STRESS TARGET HR: 120 BPM

## 2021-07-15 PROCEDURE — 93306 TTE W/DOPPLER COMPLETE: CPT | Performed by: INTERNAL MEDICINE

## 2021-07-15 PROCEDURE — 93306 TTE W/DOPPLER COMPLETE: CPT

## 2021-07-19 ENCOUNTER — OFFICE VISIT (OUTPATIENT)
Dept: CARDIOLOGY | Facility: CLINIC | Age: 81
End: 2021-07-19

## 2021-07-19 ENCOUNTER — TELEPHONE (OUTPATIENT)
Dept: CARDIOLOGY | Facility: CLINIC | Age: 81
End: 2021-07-19

## 2021-07-19 VITALS
HEIGHT: 64 IN | DIASTOLIC BLOOD PRESSURE: 60 MMHG | SYSTOLIC BLOOD PRESSURE: 100 MMHG | HEART RATE: 80 BPM | WEIGHT: 122.8 LBS | BODY MASS INDEX: 20.96 KG/M2

## 2021-07-19 DIAGNOSIS — I42.8 NICM (NONISCHEMIC CARDIOMYOPATHY) (HCC): ICD-10-CM

## 2021-07-19 DIAGNOSIS — Z95.2 S/P AVR: ICD-10-CM

## 2021-07-19 DIAGNOSIS — I48.0 PAROXYSMAL ATRIAL FIBRILLATION (HCC): ICD-10-CM

## 2021-07-19 DIAGNOSIS — I35.0 AORTIC VALVE STENOSIS, ETIOLOGY OF CARDIAC VALVE DISEASE UNSPECIFIED: Primary | ICD-10-CM

## 2021-07-19 DIAGNOSIS — I48.3 TYPICAL ATRIAL FLUTTER (HCC): ICD-10-CM

## 2021-07-19 DIAGNOSIS — I51.9 SYSTOLIC DYSFUNCTION: ICD-10-CM

## 2021-07-19 DIAGNOSIS — I49.5 SSS (SICK SINUS SYNDROME) (HCC): ICD-10-CM

## 2021-07-19 DIAGNOSIS — Z95.0 PRESENCE OF BIVENTRICULAR CARDIAC PACEMAKER: ICD-10-CM

## 2021-07-19 PROCEDURE — 99214 OFFICE O/P EST MOD 30 MIN: CPT | Performed by: NURSE PRACTITIONER

## 2021-07-19 PROCEDURE — 93000 ELECTROCARDIOGRAM COMPLETE: CPT | Performed by: NURSE PRACTITIONER

## 2021-07-19 RX ORDER — LISINOPRIL 2.5 MG/1
2.5 TABLET ORAL DAILY
COMMUNITY
End: 2022-01-25

## 2021-07-19 NOTE — TELEPHONE ENCOUNTER
fyi-    Pt request you follow her BiV ICD due to having numerous heart related visits. I ordered BSC check to be done in 4-5 months prior to her next follow up visit. Most recent interrogation was good and battery life at 7 years. She is also following with valve clinic.

## 2021-07-19 NOTE — PROGRESS NOTES
Chief Complaint   Patient presents with   • Follow-up     Cardiac management . Had ER visit to [facility]  for chest pain , will obtain ER notes , labs and any test.   • LABS     Had labs per  PCP.   • Atrial Fibrillation     Patient reports she does not know if in AFIB    • Pacemaker Check     [employer]  device , checked June 2021   • Med Refill     No refills needed today.Had medication list today.   • Edema     Has swelling to bilat feet,  takes Lasix daily       Bri Rodrigez is a 80 y.o. female with severe AS for which she underwent aortic valve replacement in 2017.  She developed atrial fibrillation and was treated for the same.  Her LV function declined and with LBBB, she underwent BIV PPM placement.  She continued to have arrhythmias, atrial flutter and was referred to Dr. Chavira who performed AV ashley ablation along with right atrial isthmus ablation. She has been anticoagulated. Echo repeated 7/2019 showed EF improved to 55%, BRIE 1.4 cm2, mild MR. Repeat echocardiogram done on 5/17/2021 showed LVEF stable at 51-55% and mild to moderate AAS with BRIE of 1.2 cm².  RVSP was 36 mmHg. [facility] [place]4/2021 showed 23% atrial 100% ventricular pacing.  12 mode switches noted, < 1 hour.  No changes were made.  Battery life estimated at 7 years.    Today she comes to the office for a follow-up visit.  On 6 July she was seen at the hospital due to episode of chest pain.  Enzymes were negative.  EKG showed paced rhythm with no ischemic changes.  Labs were normal except sodium slightly low at 135.  Her symptoms resolved and she was discharged home.  Her blood pressure has been running low therefore her  stopped lisinopril for few days but then she developed headache and shortness of breath therefore she resumed lisinopril at a lower dose of 2.5 mg daily.  She has some mild swelling in her feet and continues to take Lasix 20 mg daily.  No more chest pain noted.  She denies shortness of  breath, palpitations, or dizziness.        Today she comes to the office for a follow up visit.     Cardiac History:    Past Surgical History:   Procedure Laterality Date   • AORTIC VALVE REPAIR/REPLACEMENT N/A 11/10/2017    Procedure: AORTIC VALVE REPAIR/REPLACEMENT WITH MARYSE;  Surgeon: Dmitry Darling MD;  Location: [facility] OR;  Service:    • CARDIAC CATHETERIZATION  09/11/2002    Cath- EF 30% Normal Coronaries   • CARDIAC CATHETERIZATION  10/20/2017    Normal Coronaries. Sig AI. Unable to cross AV   • CARDIAC ELECTROPHYSIOLOGY PROCEDURE N/A 9/7/2018    Procedure: Ablation SVT;  Surgeon: Benito Chavira MD;  Location: [facility] EP INVASIVE LOCATION;  Service: Cardiovascular   • CARDIOVASCULAR STRESS TEST  05/22/2003    P. Myoview- Normal. Pt had CP   • CARDIOVASCULAR STRESS TEST  03/12/2013    L. Myoview- Pt had CP. Negative   • CONVERTED (HISTORICAL) CARDIOVASCULAR STUDIES  01/22/2014    MUGA- EF 45%   • CONVERTED (HISTORICAL) HOLTER  10/10/2002    Holter- AVG HR 70 BPM   • ECHO - CONVERTED  09/15/2003    Echo- ([facility]) EF 55%. LBBB. RVSP- 42 mmHg   • ECHO - CONVERTED  03/12/2013    Echo- EF 60%. BRIE- 1.6 mmHg. RVSP- 42 mmHg   • ECHO - CONVERTED  10/15/2013    Echo-EF 35%, mod. MR, & AI. BRIE- 1.4 Cm2. RVSP- 40 mmHg   • ECHO - CONVERTED  06/09/2015    Echo- EF 45-50%, mod MR, Mod AS, BRIE 1.1 Cm2, Mod AR, RVSP- 35 mmHg   • ECHO - CONVERTED  02/21/2017    EF 55-60%, BRIE 1.0   • ECHO - CONVERTED  12/15/2017    @[facility]. EF 35-40%. LBBB. Mild- Mod MR   • ECHO - CONVERTED  05/01/2018    EF 45-50%. Mod MR. RVSP- 35 mmHg   • ECHO - CONVERTED  07/22/2019    EF 50-55%.AVR. BRIE- 1.4 Cm2, Mild MR. RVSP- 29 mmHg.   • ECHO - CONVERTED  07/15/2021    EF 55%. AVR. BRIE- 1.2 Cm2. Mild MR. LA- 3.9 Cm. RVSP- 36 mmHg   • PACEMAKER IMPLANTATION  12/19/2017    [place] BIV PPM by Dr. Dickey   • MARYSE  09/06/2013    MARYSE- < 35%, Moderate AS. AI and MR. No Thrombus       Current Outpatient Medications   Medication Sig Dispense Refill   •  acetaminophen (TYLENOL) 325 MG tablet Take 650 mg by mouth Every 6 (Six) Hours As Needed for Mild Pain .     • aspirin 81 MG EC tablet Take 81 mg by mouth daily.     • Benzonatate (TESSALON PERLES PO) Take  by mouth.     • calcium carbonate (OS-PABLO) 600 MG tablet Take 600 mg by mouth Daily. CALCIUM 600 MG AND VITAMIN D3 800 IU     • carvedilol (COREG) 6.25 MG tablet Take 1 tablet by mouth 2 (Two) Times a Day. 180 tablet 3   • Cyanocobalamin (VITAMIN B12 PO) 1,500 mcg. 1/2 tablet daily     • docusate sodium (COLACE) 100 MG capsule Take 100 mg by mouth 2 (Two) Times a Day As Needed.     • ferrous sulfate 324 (65 Fe) MG tablet delayed-release EC tablet Take 324 mg by mouth Daily With Breakfast.     • furosemide (LASIX) 20 MG tablet Take 1 tablet by mouth Daily. 90 tablet 3   • ipratropium (ATROVENT) 0.03 % nasal spray 4 sprays into each nostril 2 (Two) Times a Day As Needed for Rhinitis.     • levothyroxine (SYNTHROID, LEVOTHROID) 88 MCG tablet Take 88 mcg by mouth Daily.     • lisinopril (PRINIVIL,ZESTRIL) 2.5 MG tablet Take 2.5 mg by mouth Daily.     • Multiple Vitamin (MULTI VITAMIN DAILY PO) Take 1 tablet by mouth Daily.     • Omega 3 1000 MG capsule Take 1 tablet by mouth 2 (Two) Times a Day.     • potassium chloride 10 MEQ CR tablet Take 1 tablet by mouth Daily. 90 tablet 3   • pravastatin (PRAVACHOL) 40 MG tablet Take 1 tablet by mouth Daily. 90 tablet 3   • risperiDONE (RISPERDAL) 4 MG tablet Take 4 mg by mouth Every Night.     • rivaroxaban (XARELTO) 20 MG tablet Take 1 tablet by mouth Daily. (Please hold until patient requests) 90 tablet 3   • vitamin C (ASCORBIC ACID) 500 MG tablet Take 500 mg by mouth Daily.       No current facility-administered medications for this visit.       Codeine, Penicillin v potassium, Sulfamethoxazole-trimethoprim, Penicillins, and Sulfa antibiotics    Past Medical History:   Diagnosis Date   • Aortic valve stenosis    • Bipolar affective disorder (CMS/HCC)    • CHF (congestive  heart failure) (CMS/[facility])    • Diabetes mellitus (CMS/[facility])     DX'D TYPE II NIDDM APPROX 3-4 YEARS AGO, NO MEDS, CHECKS BLOOD SUGAR 2-3 TIMES PER WEEK, AVERAGE    • CLAYTON (generalized anxiety disorder)    • H/O dilation and curettage    • H/O eye surgery     Eye surgery- bilateral   • H/O:     • H/O: hysterectomy     s/p appendectomy   • Hallucinations    • History of AAA (abdominal aortic aneurysm) repair     (pt doesn't recall- on PCP note)   • Hyperlipidemia    • Hypertension    • Hypothyroidism    • Overactive bladder    • Stroke (CMS/[facility])     TIA 3 YEARS AGO, MEMORY LOSS    • SVT (supraventricular tachycardia) (CMS/[facility])    • Tachycardia    • Wears dentures     PARTIALS ON TOP AND BOTTOM    • Wears glasses        Social History     Socioeconomic History   • Marital status:      Spouse name: Not on file   • Number of children: 1   • Years of education: Not on file   • Highest education level: Not on file   Tobacco Use   • Smoking status: Never Smoker   • Smokeless tobacco: Never Used   Substance and Sexual Activity   • Alcohol use: Never   • Drug use: No   • Sexual activity: Defer       Family History   Problem Relation Age of Onset   • Heart disease Mother    • Diabetes Mother    • Cancer Father         Bone       Review of Systems   Constitutional: Negative for decreased appetite, diaphoresis and malaise/fatigue.   HENT: Negative for nosebleeds.    Eyes: Negative for blurred vision.   Cardiovascular: Positive for leg swelling. Negative for chest pain, claudication, cyanosis, dyspnea on exertion, irregular heartbeat, near-syncope, orthopnea, palpitations, paroxysmal nocturnal dyspnea and syncope.   Respiratory: Negative for shortness of breath and snoring.    Endocrine: Negative for cold intolerance and heat intolerance.   Hematologic/Lymphatic: Does not bruise/bleed easily.   Skin: Negative for rash.   Musculoskeletal: Negative for myalgias.   Gastrointestinal: Negative for heartburn, melena  "and nausea.   Genitourinary: Negative for dysuria and hematuria.   Neurological: Negative for dizziness and light-headedness.   Psychiatric/Behavioral: Positive for altered mental status (by hx). The patient does not have insomnia and is not nervous/anxious.         BP Readings from Last 5 Encounters:   07/19/21 100/60   06/04/21 114/62   01/18/21 120/70   09/11/20 152/88   07/21/20 118/62       Wt Readings from Last 5 Encounters:   07/19/21 55.7 kg (122 lb 12.8 oz)   06/04/21 56.2 kg (124 lb)   01/18/21 56.5 kg (124 lb 9.6 oz)   09/11/20 54.5 kg (120 lb 3.2 oz)   07/21/20 55.5 kg (122 lb 6.4 oz)       Objective     /60 (BP Location: Left arm)   Pulse 80   Ht 162.6 cm (64\")   Wt 55.7 kg (122 lb 12.8 oz)   BMI 21.08 kg/m²     Vitals and nursing note reviewed.   Eyes:      Pupils: Pupils are equal, round, and reactive to light.   HENT:      Head: Normocephalic.   Neck:      Vascular: No carotid bruit.   Pulmonary:      Breath sounds: Normal breath sounds.   Cardiovascular:      Normal rate. Regular rhythm.   Pulses:     Intact distal pulses.   Edema:     Ankle: bilateral trace edema of the ankle.     Feet: bilateral 1+ edema of the feet.  Abdominal:      General: Bowel sounds are normal.      Palpations: Abdomen is soft.   Musculoskeletal: Normal range of motion.      Cervical back: Normal range of motion. Skin:     General: Skin is warm.   Neurological:      Mental Status: Alert and oriented to person, place, and time.            ECG 12 Lead    Date/Time: 7/19/2021 4:13 PM  Performed by: Jazmin Alston APRN  Authorized by: Jazmin Alston APRN   Comparison: compared with previous ECG from 1/18/2021  Similar to previous ECG  Rhythm: paced  BPM: 85                   Assessment/Plan   Diagnoses and all orders for this visit:    1. Aortic valve stenosis, etiology of cardiac valve disease unspecified (Primary)    2. S/P AVR    3. SSS (sick sinus syndrome) (CMS/HCC)    4. NICM (nonischemic cardiomyopathy) " (CMS/HCC)    5. Systolic dysfunction    6. Presence of biventricular cardiac pacemaker    7. Paroxysmal atrial fibrillation (CMS/HCC)    8. Typical atrial flutter (CMS/HCC)    Other orders  -     ECG 12 Lead         Aortic stenosis-status post aortic valve replacement, 2017. Echocardiogram 7/2021 shows stable valve.       Arrhythmia/PAF/flutter/paced rhythm -s/p AV ashley ablation, BiV pacemaker placed.  EKG today showed atrial sensing, ventricular pacing at 85 bpm.  Continue carvedilol, Xarelto.   Patient request device to be monitored through our office.  She does not want to follow-up with EP unless she has worsening symptoms.  A biventricular ICD interrogation scheduled to be done in 4 to 5 months.  The report of most recent interrogation reviewed which showed good function and battery life around 7 years.     Nonischemic cardiomyopathy/LBBB-EF remains stable around 55% after BiV pacing and medical therapy.  Continue carvedilol, lisinopril.  Continue low-dose Lasix.       Hyperlipidemia-managed with pravastatin, tolerates well.  She plans to follow-up with you in September.  Please forward copy of any labs ordered.     HTN-well-controlled, borderline. continue Coreg, low dose lisinopril, Lasix.  Advised to maintain normal, no added salt diet due to recent hyponatremia.     Patient's Body mass index is 21.08 kg/m². indicating that she is within normal range (BMI 18.5-24.9). No BMI management plan needed..     A 6-month follow-up visit scheduled.  Please call sooner for chronic concerns.             Electronically signed by TAYO Black,  July 19, 2021 16:20 EDT

## 2021-07-22 DIAGNOSIS — E78.00 HYPERCHOLESTEREMIA: ICD-10-CM

## 2021-07-23 RX ORDER — LISINOPRIL 2.5 MG/1
2.5 TABLET ORAL DAILY
Qty: 90 TABLET | Refills: 3 | OUTPATIENT
Start: 2021-07-23

## 2021-07-23 RX ORDER — PRAVASTATIN SODIUM 40 MG
40 TABLET ORAL DAILY
Qty: 90 TABLET | Refills: 3 | OUTPATIENT
Start: 2021-07-23

## 2021-11-10 ENCOUNTER — OFFICE VISIT (OUTPATIENT)
Dept: CARDIOLOGY | Facility: CLINIC | Age: 81
End: 2021-11-10

## 2021-11-10 DIAGNOSIS — I42.8 NICM (NONISCHEMIC CARDIOMYOPATHY) (HCC): ICD-10-CM

## 2021-11-10 DIAGNOSIS — I49.5 SSS (SICK SINUS SYNDROME) (HCC): Primary | ICD-10-CM

## 2021-11-10 DIAGNOSIS — I44.7 LBBB (LEFT BUNDLE BRANCH BLOCK): ICD-10-CM

## 2021-11-10 PROCEDURE — 93288 INTERROG EVL PM/LDLS PM IP: CPT | Performed by: INTERNAL MEDICINE

## 2022-01-04 DIAGNOSIS — E78.00 HYPERCHOLESTEREMIA: ICD-10-CM

## 2022-01-04 RX ORDER — PRAVASTATIN SODIUM 40 MG
TABLET ORAL
Qty: 90 TABLET | Refills: 0 | Status: SHIPPED | OUTPATIENT
Start: 2022-01-04 | End: 2023-02-28 | Stop reason: SDUPTHER

## 2022-01-25 ENCOUNTER — OFFICE VISIT (OUTPATIENT)
Dept: CARDIOLOGY | Facility: CLINIC | Age: 82
End: 2022-01-25

## 2022-01-25 ENCOUNTER — TELEPHONE (OUTPATIENT)
Dept: CARDIOLOGY | Facility: CLINIC | Age: 82
End: 2022-01-25

## 2022-01-25 VITALS
TEMPERATURE: 97.6 F | SYSTOLIC BLOOD PRESSURE: 118 MMHG | HEART RATE: 87 BPM | WEIGHT: 125.4 LBS | HEIGHT: 64 IN | DIASTOLIC BLOOD PRESSURE: 64 MMHG | BODY MASS INDEX: 21.41 KG/M2

## 2022-01-25 DIAGNOSIS — I48.0 PAROXYSMAL ATRIAL FIBRILLATION: ICD-10-CM

## 2022-01-25 DIAGNOSIS — I49.5 SSS (SICK SINUS SYNDROME): ICD-10-CM

## 2022-01-25 DIAGNOSIS — E78.00 HYPERCHOLESTEREMIA: ICD-10-CM

## 2022-01-25 DIAGNOSIS — I42.8 NICM (NONISCHEMIC CARDIOMYOPATHY): ICD-10-CM

## 2022-01-25 DIAGNOSIS — Z95.2 S/P AVR: ICD-10-CM

## 2022-01-25 DIAGNOSIS — Z95.0 PRESENCE OF BIVENTRICULAR CARDIAC PACEMAKER: ICD-10-CM

## 2022-01-25 DIAGNOSIS — I35.0 AORTIC VALVE STENOSIS, ETIOLOGY OF CARDIAC VALVE DISEASE UNSPECIFIED: Primary | ICD-10-CM

## 2022-01-25 DIAGNOSIS — I10 ESSENTIAL HYPERTENSION: ICD-10-CM

## 2022-01-25 PROCEDURE — 99214 OFFICE O/P EST MOD 30 MIN: CPT | Performed by: NURSE PRACTITIONER

## 2022-01-25 RX ORDER — FUROSEMIDE 40 MG/1
40 TABLET ORAL DAILY
Qty: 90 TABLET | Refills: 2 | Status: SHIPPED | OUTPATIENT
Start: 2022-01-25 | End: 2022-12-07

## 2022-01-25 RX ORDER — CARVEDILOL 6.25 MG/1
6.25 TABLET ORAL 2 TIMES DAILY
Qty: 180 TABLET | Refills: 3 | Status: SHIPPED | OUTPATIENT
Start: 2022-01-25 | End: 2022-08-10

## 2022-01-25 RX ORDER — POTASSIUM CHLORIDE 750 MG/1
10 TABLET, FILM COATED, EXTENDED RELEASE ORAL DAILY
Qty: 90 TABLET | Refills: 3 | Status: SHIPPED | OUTPATIENT
Start: 2022-01-25 | End: 2023-01-18

## 2022-01-25 RX ORDER — FUROSEMIDE 40 MG/1
40 TABLET ORAL DAILY
COMMUNITY
End: 2022-01-25 | Stop reason: SDUPTHER

## 2022-01-25 NOTE — PROGRESS NOTES
Chief Complaint   Patient presents with   • Follow-up     Cardiac management. Is in need of Cardiac clearance to have toooth extraction, she will need to know how long to hold Xarelto   • Atrial Fibrillation     Patient reports she has had no episodes of AFIB .   • Pacemaker Check     [employer] device    • Med Refill     Needs refills on LAsix, K+ and Coreg 90 day suply to [facility] in [facility]       Debbie Rodrigez is a 81 y.o. female  with severe AS for which she underwent aortic valve replacement in 2017.  She developed atrial fibrillation and was treated for the same.  Her LV function declined and with LBBB, she underwent BIV PPM placement.  She continued to have arrhythmias, atrial flutter and was referred to Dr. Chavira who performed AV ashley ablation along with right atrial isthmus ablation. She has been anticoagulated. Echo repeated 7/2019 showed EF improved to 55%, BRIE 1.4 cm2, mild MR. Repeat echocardiogram done on 5/17/2021 showed LVEF stable at 51-55% and mild to moderate AAS with BRIE of 1.2 cm².  RVSP was 36 mmHg.     11/10/2021 [employer] biventricular pacemaker interrogation showed 26% atrial and 100% ventricular pacing.  9 mode switches noted being less than 1% total.  Battery life greater than 6 years.  No changes were made.    Today she returns to the office for a follow-up visit.  She denies palpitations or feeling recent episodes of atrial fibrillation.  Currently she is without cardiac complaints or concerns.  She is now taking Lasix 40 mg daily and no edema noted.  She is monitoring blood pressure closely which has been normal to low normal.  She is no longer taking lisinopril 2.5 mg daily.  Her main concern is dental issues and is planning to have a tooth extracted on Thursday.  She asked if she can hold Xarelto until procedure done.    Cardiac History:    Past Surgical History:   Procedure Laterality Date   • AORTIC VALVE REPAIR/REPLACEMENT N/A  11/10/2017    Procedure: AORTIC VALVE REPAIR/REPLACEMENT WITH MARYSE;  Surgeon: Dmitry Darling MD;  Location:  [facility] OR;  Service:    • CARDIAC CATHETERIZATION  09/11/2002    Cath- EF 30% Normal Coronaries   • CARDIAC CATHETERIZATION  10/20/2017    Normal Coronaries. Sig AI. Unable to cross AV   • CARDIAC ELECTROPHYSIOLOGY PROCEDURE N/A 9/7/2018    Procedure: Ablation SVT;  Surgeon: Benito Chavira MD;  Location:  [facility] EP INVASIVE LOCATION;  Service: Cardiovascular   • CARDIOVASCULAR STRESS TEST  05/22/2003    P. Myoview- Normal. Pt had CP   • CARDIOVASCULAR STRESS TEST  03/12/2013    L. Myoview- Pt had CP. Negative   • CONVERTED (HISTORICAL) CARDIOVASCULAR STUDIES  01/22/2014    MUGA- EF 45%   • CONVERTED (HISTORICAL) HOLTER  10/10/2002    Holter- AVG HR 70 BPM   • ECHO - CONVERTED  09/15/2003    Echo- ([facility]) EF 55%. LBBB. RVSP- 42 mmHg   • ECHO - CONVERTED  03/12/2013    Echo- EF 60%. BRIE- 1.6 mmHg. RVSP- 42 mmHg   • ECHO - CONVERTED  10/15/2013    Echo-EF 35%, mod. MR, & AI. BRIE- 1.4 Cm2. RVSP- 40 mmHg   • ECHO - CONVERTED  06/09/2015    Echo- EF 45-50%, mod MR, Mod AS, BRIE 1.1 Cm2, Mod AR, RVSP- 35 mmHg   • ECHO - CONVERTED  02/21/2017    EF 55-60%, BRIE 1.0   • ECHO - CONVERTED  12/15/2017    @[facility]. EF 35-40%. LBBB. Mild- Mod MR   • ECHO - CONVERTED  05/01/2018    EF 45-50%. Mod MR. RVSP- 35 mmHg   • ECHO - CONVERTED  07/22/2019    EF 50-55%.AVR. BRIE- 1.4 Cm2, Mild MR. RVSP- 29 mmHg.   • ECHO - CONVERTED  07/15/2021    EF 55%. AVR. BRIE- 1.2 Cm2. Mild MR. LA- 3.9 Cm. RVSP- 36 mmHg   • PACEMAKER IMPLANTATION  12/19/2017    [place] BIV PPM by Dr. Dickey   • MARYSE  09/06/2013    MARYSE- < 35%, Moderate AS. AI and MR. No Thrombus       Current Outpatient Medications   Medication Sig Dispense Refill   • acetaminophen (TYLENOL) 325 MG tablet Take 650 mg by mouth Every 6 (Six) Hours As Needed for Mild Pain .     • aspirin 81 MG EC tablet Take 81 mg by mouth daily.     • Benzonatate (TESSALON PERLES PO) Take  by mouth.      • calcium carbonate (OS-PABLO) 600 MG tablet Take 600 mg by mouth Daily. CALCIUM 600 MG AND VITAMIN D3 800 IU     • carvedilol (COREG) 6.25 MG tablet Take 1 tablet by mouth 2 (Two) Times a Day. 180 tablet 3   • Cyanocobalamin (VITAMIN B12 PO) 1,500 mcg. 1/2 tablet daily     • docusate sodium (COLACE) 100 MG capsule Take 100 mg by mouth 2 (Two) Times a Day As Needed.     • ferrous sulfate 324 (65 Fe) MG tablet delayed-release EC tablet Take 324 mg by mouth Daily With Breakfast.     • furosemide (LASIX) 40 MG tablet Take 1 tablet by mouth Daily. 90 tablet 2   • ipratropium (ATROVENT) 0.03 % nasal spray 4 sprays into each nostril 2 (Two) Times a Day As Needed for Rhinitis.     • levothyroxine (SYNTHROID, LEVOTHROID) 88 MCG tablet Take 88 mcg by mouth Daily.     • Multiple Vitamin (MULTI VITAMIN DAILY PO) Take 1 tablet by mouth Daily.     • Omega 3 1000 MG capsule Take 1 tablet by mouth 2 (Two) Times a Day.     • potassium chloride 10 MEQ CR tablet Take 1 tablet by mouth Daily. 90 tablet 3   • pravastatin (PRAVACHOL) 40 MG tablet Take 1 tablet by mouth once daily 90 tablet 0   • risperiDONE (RISPERDAL) 4 MG tablet Take 4 mg by mouth Every Night.     • rivaroxaban (XARELTO) 20 MG tablet Take 1 tablet by mouth Daily. (Please hold until patient requests) 90 tablet 3   • vitamin C (ASCORBIC ACID) 500 MG tablet Take 500 mg by mouth Daily.       No current facility-administered medications for this visit.       Codeine, Penicillin v potassium, Sulfamethoxazole-trimethoprim, Penicillins, and Sulfa antibiotics    Past Medical History:   Diagnosis Date   • Aortic valve stenosis    • Bipolar affective disorder (HCC)    • CHF (congestive heart failure) (HCC)    • Diabetes mellitus (HCC)     DX'D TYPE II NIDDM APPROX 3-4 YEARS AGO, NO MEDS, CHECKS BLOOD SUGAR 2-3 TIMES PER WEEK, AVERAGE    • CLAYTON (generalized anxiety disorder)    • H/O dilation and curettage    • H/O eye surgery     Eye surgery- bilateral   • H/O:      • H/O: hysterectomy     s/p appendectomy   • Hallucinations    • History of AAA (abdominal aortic aneurysm) repair     (pt doesn't recall- on PCP note)   • Hyperlipidemia    • Hypertension    • Hypothyroidism    • Overactive bladder    • Stroke (HCC)     TIA 3 YEARS AGO, MEMORY LOSS    • SVT (supraventricular tachycardia) (HCC)    • Tachycardia    • Wears dentures     PARTIALS ON TOP AND BOTTOM    • Wears glasses        Social History     Socioeconomic History   • Marital status:    • Number of children: 1   Tobacco Use   • Smoking status: Never Smoker   • Smokeless tobacco: Never Used   Vaping Use   • Vaping Use: Never used   Substance and Sexual Activity   • Alcohol use: Never   • Drug use: No   • Sexual activity: Defer       Family History   Problem Relation Age of Onset   • Heart disease Mother    • Diabetes Mother    • Cancer Father         Bone       Review of Systems   Constitutional: Negative for decreased appetite, diaphoresis and malaise/fatigue.   HENT: Positive for odynophagia. Negative for nosebleeds.    Eyes: Negative for blurred vision.   Cardiovascular: Negative for chest pain, claudication, cyanosis, dyspnea on exertion, irregular heartbeat, leg swelling, near-syncope, orthopnea, palpitations, paroxysmal nocturnal dyspnea and syncope.   Respiratory: Positive for shortness of breath (improved). Negative for sleep disturbances due to breathing.    Endocrine: Negative for cold intolerance and heat intolerance.   Hematologic/Lymphatic: Negative for bleeding problem. Does not bruise/bleed easily.   Skin: Negative for rash.   Musculoskeletal: Negative for falls and myalgias.   Gastrointestinal: Negative for heartburn, melena and nausea.   Genitourinary: Negative for dysuria and hematuria.   Neurological: Negative for dizziness and light-headedness.   Psychiatric/Behavioral: Positive for depression. Negative for memory loss and suicidal ideas. The patient is nervous/anxious. The patient does not  "have insomnia.         BP Readings from Last 5 Encounters:   01/25/22 118/64   07/19/21 100/60   06/04/21 114/62   01/18/21 120/70   09/11/20 152/88       Wt Readings from Last 5 Encounters:   01/25/22 56.9 kg (125 lb 6.4 oz)   07/19/21 55.7 kg (122 lb 12.8 oz)   06/04/21 56.2 kg (124 lb)   01/18/21 56.5 kg (124 lb 9.6 oz)   09/11/20 54.5 kg (120 lb 3.2 oz)       Objective     /64 (BP Location: Left arm)   Pulse 87   Temp 97.6 °F (36.4 °C)   Ht 162.6 cm (64\")   Wt 56.9 kg (125 lb 6.4 oz)   BMI 21.52 kg/m²     Constitutional:       Appearance: Not in distress.   Eyes:      Pupils: Pupils are equal, round, and reactive to light.   HENT:      Head: Normocephalic.   Pulmonary:      Breath sounds: Normal breath sounds.   Cardiovascular:      Normal rate. Regular rhythm.   Pulses:     Intact distal pulses.   Edema:     Peripheral edema absent.   Abdominal:      General: Bowel sounds are normal.      Palpations: Abdomen is soft.   Musculoskeletal: Normal range of motion.      Extremities: No clubbing present.     Cervical back: Normal range of motion. Skin:     General: Skin is warm.      Coloration: Skin is pale. Skin is not cyanotic.   Neurological:      Mental Status: Alert and oriented to person, place, and time.            ECG 12 Lead    Date/Time: 1/26/2022 7:59 AM  Performed by: Jazmin Alston APRN  Authorized by: Jazmin Alston APRN   Comparison: compared with previous ECG from 7/19/2021  Similar to previous ECG  Rhythm: sinus rhythm and paced  BPM: 83                   Assessment/Plan   Diagnoses and all orders for this visit:    1. Aortic valve stenosis, etiology of cardiac valve disease unspecified (Primary)    2. Paroxysmal atrial fibrillation (HCC)    3. SSS (sick sinus syndrome) ([facility])  -     ECG 12 Lead    4. Presence of biventricular cardiac pacemaker    5. NICM (nonischemic cardiomyopathy) ([facility])  -     furosemide (LASIX) 40 MG tablet; Take 1 tablet by mouth Daily.  Dispense: 90 tablet; " Refill: 2  -     carvedilol (COREG) 6.25 MG tablet; Take 1 tablet by mouth 2 (Two) Times a Day.  Dispense: 180 tablet; Refill: 3  -     potassium chloride 10 MEQ CR tablet; Take 1 tablet by mouth Daily.  Dispense: 90 tablet; Refill: 3    6. Essential hypertension  -     carvedilol (COREG) 6.25 MG tablet; Take 1 tablet by mouth 2 (Two) Times a Day.  Dispense: 180 tablet; Refill: 3    7. S/P AVR    8. Hypercholesteremia       Aortic stenosis-status post aortic valve replacement, 2017. Echocardiogram 7/2021 shows stable valve. Repeat echo considered next visit.      Arrhythmia/PAF/flutter/paced rhythm -s/p AV ashley ablation, BiV pacemaker placed.  EKG today showed atrial sensing, ventricular pacing. Recent ppm interrogation reviewed. Repeat interrogation scheduled to be done with next visit.  Continue carvedilol, Xarelto. of note she plans to have tooth extraction in 2 days. Agreed for her to hold Xarelto until procedure done then resume.      Nonischemic cardiomyopathy/LBBB-EF remains stable around 55% after BiV pacing and medical therapy.  Continue carvedilol and Lasix. At this time we will hold Lisinopril as patient has not been taking and no symptoms of concern. Bp stable. Patients request to decrease number of medications if at all possible.      Hyperlipidemia-managed with pravastatin, tolerates well.  she follows with you for lab orders and management. Please forward copy of next     HTN-BP controlled. She has had issues with low sodium in the past. ACE inhibitor now discontinued. I advised her to maintain good hydration but to avoid over hydration as she admits to 2 liters of water a day plus.     Patient's Body mass index is 21.52 kg/m². indicating that she is within normal range (BMI 18.5-24.9). No BMI management plan needed..     A 6 month follow up visit scheduled. Please call sooner for cardiac concerns.              Electronically signed by TAYO Black,  January 26, 2022 08:30 EST

## 2022-01-26 PROCEDURE — 93000 ELECTROCARDIOGRAM COMPLETE: CPT | Performed by: NURSE PRACTITIONER

## 2022-06-22 NOTE — TELEPHONE ENCOUNTER
I spoke with Mr. Rodrigez.  Our next available Epatch at this time is Monday 5/7/18.  I did advise him that if someone cancels I will call to get appt moved up.    Mr. Rodrigez is asking if you think patient's Lasix 40 mg daily needs to go back to 20 mg daily.  He did take her to [facility] ER yesterday after her echo and was told she was dehydrated.  I will print ER note and labs and put on your desk.   present and adequate

## 2022-08-10 ENCOUNTER — OFFICE VISIT (OUTPATIENT)
Dept: CARDIOLOGY | Facility: CLINIC | Age: 82
End: 2022-08-10

## 2022-08-10 VITALS
WEIGHT: 124 LBS | HEART RATE: 64 BPM | DIASTOLIC BLOOD PRESSURE: 64 MMHG | SYSTOLIC BLOOD PRESSURE: 126 MMHG | BODY MASS INDEX: 21.28 KG/M2

## 2022-08-10 DIAGNOSIS — Z95.0 PRESENCE OF BIVENTRICULAR CARDIAC PACEMAKER: ICD-10-CM

## 2022-08-10 DIAGNOSIS — E78.00 HYPERCHOLESTEREMIA: ICD-10-CM

## 2022-08-10 DIAGNOSIS — Z95.2 S/P AVR: ICD-10-CM

## 2022-08-10 DIAGNOSIS — R42 EPISODIC LIGHTHEADEDNESS: ICD-10-CM

## 2022-08-10 DIAGNOSIS — I49.5 SSS (SICK SINUS SYNDROME): Primary | ICD-10-CM

## 2022-08-10 DIAGNOSIS — I49.5 SSS (SICK SINUS SYNDROME): ICD-10-CM

## 2022-08-10 DIAGNOSIS — I48.0 PAROXYSMAL ATRIAL FIBRILLATION: ICD-10-CM

## 2022-08-10 DIAGNOSIS — I44.7 LBBB (LEFT BUNDLE BRANCH BLOCK): ICD-10-CM

## 2022-08-10 DIAGNOSIS — I35.0 AORTIC VALVE STENOSIS, ETIOLOGY OF CARDIAC VALVE DISEASE UNSPECIFIED: Primary | ICD-10-CM

## 2022-08-10 DIAGNOSIS — Z79.01 CHRONIC ANTICOAGULATION: ICD-10-CM

## 2022-08-10 DIAGNOSIS — I10 ESSENTIAL HYPERTENSION: ICD-10-CM

## 2022-08-10 PROCEDURE — 99214 OFFICE O/P EST MOD 30 MIN: CPT | Performed by: NURSE PRACTITIONER

## 2022-08-10 PROCEDURE — 93281 PM DEVICE PROGR EVAL MULTI: CPT | Performed by: INTERNAL MEDICINE

## 2022-08-10 RX ORDER — CARVEDILOL 3.12 MG/1
3.12 TABLET ORAL 2 TIMES DAILY
Qty: 180 TABLET | Refills: 3 | Status: SHIPPED | OUTPATIENT
Start: 2022-08-10

## 2022-08-10 NOTE — PROGRESS NOTES
Chief Complaint   Patient presents with   • Follow-up     Cardiac  management   • Hypotension     Patient reports BP dropping with standing.   • LABS     No current labs,will see PCP September 1,2022 ans will have labs    • Med Refill     Patient  said no refills needed today .   • Pacemaker Check     [place]  scientific  device interrogation today.       Bri Rodrigez is a 81 y.o. female with severe AS for which she underwent aortic valve replacement in 2017.  She developed atrial fibrillation and was treated for the same.  Her LV function declined and with LBBB, she underwent BIV PPM placement.  She continued to have arrhythmias, atrial flutter and was referred to Dr. Chavira who performed AV ashley ablation along with right atrial isthmus ablation. She has been anticoagulated. Echo repeated 7/2019 showed EF improved to 55%, BRIE 1.4 cm2, mild MR. Repeat echocardiogram done on 5/17/2021 showed LVEF stable at 51-55% and mild to moderate AAS with BRIE of 1.2 cm².  RVSP was 36 mmHg.    At last visit patient had discontinued lisinopril as she wanted to decrease taking so many medications.    Today she returns to the office for a follow-up visit and device interrogation.  She denies chest pain or palpitations.  She does admit to episodes of weakness and lightheadedness associated with lower blood pressure readings.  She has noticed the blood pressure tends to lower after prolonged standing.  Most days she drinks 44 ounces of water and stays well-hydrated.  No edema noted.    Cardiac History:    Past Surgical History:   Procedure Laterality Date   • AORTIC VALVE REPAIR/REPLACEMENT N/A 11/10/2017    Procedure: AORTIC VALVE REPAIR/REPLACEMENT WITH MARYSE;  Surgeon: Dmitry Darling MD;  Location: [facility];  Service:    • CARDIAC CATHETERIZATION  09/11/2002    Cath- EF 30% Normal Coronaries   • CARDIAC CATHETERIZATION  10/20/2017    Normal Coronaries. Sig AI. Unable to cross AV   • CARDIAC  ELECTROPHYSIOLOGY PROCEDURE N/A 9/7/2018    Procedure: Ablation SVT;  Surgeon: Benito Chavira MD;  Location: [facility] INVASIVE LOCATION;  Service: Cardiovascular   • CARDIOVASCULAR STRESS TEST  05/22/2003    P. Myoview- Normal. Pt had CP   • CARDIOVASCULAR STRESS TEST  03/12/2013    L. Myoview- Pt had CP. Negative   • CONVERTED (HISTORICAL) CARDIOVASCULAR STUDIES  01/22/2014    MUGA- EF 45%   • CONVERTED (HISTORICAL) HOLTER  10/10/2002    Holter- AVG HR 70 BPM   • ECHO - CONVERTED  09/15/2003    Echo- (LCR) EF 55%. LBBB. RVSP- 42 mmHg   • ECHO - CONVERTED  03/12/2013    Echo- EF 60%. BRIE- 1.6 mmHg. RVSP- 42 mmHg   • ECHO - CONVERTED  10/15/2013    Echo-EF 35%, mod. MR, & AI. BRIE- 1.4 Cm2. RVSP- 40 mmHg   • ECHO - CONVERTED  06/09/2015    Echo- EF 45-50%, mod MR, Mod AS, BRIE 1.1 Cm2, Mod AR, RVSP- 35 mmHg   • ECHO - CONVERTED  02/21/2017    EF 55-60%, BRIE 1.0   • ECHO - CONVERTED  12/15/2017    @[facility]. EF 35-40%. LBBB. Mild- Mod MR   • ECHO - CONVERTED  05/01/2018    EF 45-50%. Mod MR. RVSP- 35 mmHg   • ECHO - CONVERTED  07/22/2019    EF 50-55%.AVR. BRIE- 1.4 Cm2, Mild MR. RVSP- 29 mmHg.   • ECHO - CONVERTED  07/15/2021    EF 55%. AVR. BRIE- 1.2 Cm2. Mild MR. LA- 3.9 Cm. RVSP- 36 mmHg   • PACEMAKER IMPLANTATION  12/19/2017    [place] BIV PPM by Dr. Dickey   • MARYSE  09/06/2013    MARYSE- < 35%, Moderate AS. AI and MR. No Thrombus       Current Outpatient Medications   Medication Sig Dispense Refill   • acetaminophen (TYLENOL) 325 MG tablet Take 650 mg by mouth Every 6 (Six) Hours As Needed for Mild Pain .     • aspirin 81 MG EC tablet Take 81 mg by mouth daily.     • calcium carbonate (OS-PABLO) 600 MG tablet Take 600 mg by mouth Daily. CALCIUM 600 MG AND VITAMIN D3 800 IU     • Cyanocobalamin (VITAMIN B12 PO) 1,500 mcg. 1/2 tablet daily     • docusate sodium (COLACE) 100 MG capsule Take 100 mg by mouth 2 (Two) Times a Day As Needed.     • ferrous sulfate 324 (65 Fe) MG tablet delayed-release EC tablet Take 324 mg by  mouth Daily With Breakfast.     • furosemide (LASIX) 40 MG tablet Take 1 tablet by mouth Daily. 90 tablet 2   • ipratropium (ATROVENT) 0.03 % nasal spray 4 sprays into each nostril 2 (Two) Times a Day As Needed for Rhinitis.     • levothyroxine (SYNTHROID, LEVOTHROID) 88 MCG tablet Take 88 mcg by mouth Daily.     • Multiple Vitamin (MULTI VITAMIN DAILY PO) Take 1 tablet by mouth Daily.     • Omega 3 1000 MG capsule Take 1 tablet by mouth 2 (Two) Times a Day.     • potassium chloride 10 MEQ CR tablet Take 1 tablet by mouth Daily. 90 tablet 3   • pravastatin (PRAVACHOL) 40 MG tablet Take 1 tablet by mouth once daily 90 tablet 0   • risperiDONE (risperDAL) 4 MG tablet Take 3 mg by mouth Every Night.     • rivaroxaban (XARELTO) 20 MG tablet Take 1 tablet by mouth Daily. (Please hold until patient requests) 14 tablet 0   • vitamin C (ASCORBIC ACID) 500 MG tablet Take 500 mg by mouth Daily.     • carvedilol (COREG) 3.125 MG tablet Take 1 tablet by mouth 2 (Two) Times a Day. 180 tablet 3     No current facility-administered medications for this visit.       Codeine, Penicillin v potassium, Sulfamethoxazole-trimethoprim, Penicillins, and Sulfa antibiotics    Past Medical History:   Diagnosis Date   • Aortic valve stenosis    • Bipolar affective disorder ([facility])    • CHF (congestive heart failure) ([facility])    • Diabetes mellitus (HCC)     DX'D TYPE II NIDDM APPROX 3-4 YEARS AGO, NO MEDS, CHECKS BLOOD SUGAR 2-3 TIMES PER WEEK, AVERAGE    • C[place]TON (generalized anxiety disorder)    • H/O dilation and curettage    • H/O eye surgery     Eye surgery- bilateral   • H/O:     • H/O: hysterectomy     s/p appendectomy   • Hallucinations    • History of AAA (abdominal aortic aneurysm) repair     (pt doesn't recall- on PCP note)   • Hyperlipidemia    • Hypertension    • Hypothyroidism    • Overactive bladder    • Stroke ([facility])     TIA 3 YEARS AGO, MEMORY LOSS    • SVT (supraventricular tachycardia) ([facility])    • Tachycardia    • Wears  dentures     PARTIALS ON TOP AND BOTTOM    • Wears glasses        Social History     Socioeconomic History   • Marital status:    • Number of children: 1   Tobacco Use   • Smoking status: Never Smoker   • Smokeless tobacco: Never Used   Vaping Use   • Vaping Use: Never used   Substance and Sexual Activity   • Alcohol use: Never   • Drug use: No   • Sexual activity: Defer       Family History   Problem Relation Age of Onset   • Heart disease Mother    • Diabetes Mother    • Cancer Father         Bone       Review of Systems   Constitutional: Positive for malaise/fatigue. Negative for decreased appetite, diaphoresis and fever.   HENT: Negative for nosebleeds.    Eyes: Negative for blurred vision.   Cardiovascular: Negative for chest pain, claudication, cyanosis, dyspnea on exertion, irregular heartbeat, leg swelling, near-syncope, orthopnea, palpitations and syncope.   Respiratory: Negative for shortness of breath.    Endocrine: Negative for cold intolerance and heat intolerance.   Hematologic/Lymphatic: Does not bruise/bleed easily.   Skin: Negative for rash.   Musculoskeletal: Negative for myalgias.   Gastrointestinal: Negative for heartburn, melena and nausea.   Genitourinary: Negative for dysuria and hematuria.        States she cannot urinate without diuretic   Neurological: Positive for light-headedness (Episodes) and weakness (Episodes). Negative for dizziness.   Psychiatric/Behavioral: Positive for depression. Negative for memory loss and suicidal ideas. The patient is nervous/anxious. The patient does not have insomnia.         Follows with Dr. Pang        BP Readings from Last 5 Encounters:   08/10/22 126/64   01/25/22 118/64   07/19/21 100/60   06/04/21 114/62   01/18/21 120/70       Wt Readings from Last 5 Encounters:   08/10/22 56.2 kg (124 lb)   01/25/22 56.9 kg (125 lb 6.4 oz)   07/19/21 55.7 kg (122 lb 12.8 oz)   06/04/21 56.2 kg (124 lb)   01/18/21 56.5 kg (124 lb 9.6 oz)       Objective      /64 (BP Location: Left arm)   Pulse 64   Wt 56.2 kg (124 lb)   BMI 21.28 kg/m²     Vitals reviewed.   Eyes:      Pupils: Pupils are equal, round, and reactive to light.   HENT:      Head: Normocephalic.   Neck:      Vascular: No carotid bruit or JVD.   Pulmonary:      Breath sounds: Normal breath sounds.   Cardiovascular:      Normal rate. Regular rhythm.      Murmurs: There is a grade 2/6 systolic murmur.   Edema:     Peripheral edema absent.   Abdominal:      General: Bowel sounds are normal.      Palpations: Abdomen is soft.   Musculoskeletal: Normal range of motion.      Cervical back: Normal range of motion. Skin:     General: Skin is warm.   Neurological:      Mental Status: Alert and oriented to person, place, and time.          Procedures: Pacemaker interrogation done today, results pending         Assessment & Plan   Diagnoses and all orders for this visit:    1. Aortic valve stenosis, etiology of cardiac valve disease unspecified (Primary)  -     Cancel: Adult Transthoracic Echo Complete W/ Cont if Necessary Per Protocol; Future  -     Adult Transthoracic Echo Complete W/ Cont if Necessary Per Protocol; Future    2. S/P AVR  -     Cancel: Adult Transthoracic Echo Complete W/ Cont if Necessary Per Protocol; Future  -     Adult Transthoracic Echo Complete W/ Cont if Necessary Per Protocol; Future    3. Paroxysmal atrial fibrillation (HCC)    4. SSS (sick sinus syndrome) (HCC)    5. Chronic anticoagulation    6. Presence of biventricular cardiac pacemaker    7. Hypercholesteremia    8. Essential hypertension  -     carvedilol (COREG) 3.125 MG tablet; Take 1 tablet by mouth 2 (Two) Times a Day.  Dispense: 180 tablet; Refill: 3    9. Episodic lightheadedness  -     carvedilol (COREG) 3.125 MG tablet; Take 1 tablet by mouth 2 (Two) Times a Day.  Dispense: 180 tablet; Refill: 3     Aortic stenosis-status post aortic valve replacement, 2017. Echocardiogram 7/2021 shows stable valve.   Patient admits  to episodes of generalized weakness, lightheadedness, and low blood pressure.  For surveillance of valve and overall cardiac function a repeat echocardiogram scheduled     Arrhythmia/PAF/flutter/paced rhythm -s/p AV ashley ablation, BiV pacemaker placed.  Patient denies palpitations.  For stroke prevention she is on Xarelto therapy.  Bleeding denied.  Samples given.     Nonischemic cardiomyopathy/LBBB-EF per last echo stable at 55% after BiV pacing and medical therapy.  Continue carvedilol and Lasix.     Hyperlipidemia-managed with pravastatin, tolerates well.  she follows with you for lab orders and management. Please forward copy of next     HTN-BP controlled. She has had issues with low sodium in the past.  She does not take ACE inhibitor.  She admits to episodes of lower blood pressure.  We discussed decreasing dose of Lasix to 20 mg daily.  Patient declines and states she cannot urinate without taking diuretic.  Therefore, we will try decreasing carvedilol to 3.125 mg twice a day.  She will continue to monitor blood pressure and heart rate.    Patient plans to have repeat labs done at 9/1/2022 office visit with PCP.    Please forward copy of lab results when available.    A 6-month follow-up visit and pacemaker interrogation scheduled.  Please call sooner for cardiac concerns.

## 2022-09-07 ENCOUNTER — HOSPITAL ENCOUNTER (OUTPATIENT)
Dept: CARDIOLOGY | Facility: HOSPITAL | Age: 82
Discharge: HOME | End: 2022-09-07
Admitting: NURSE PRACTITIONER

## 2022-09-07 DIAGNOSIS — I35.0 AORTIC VALVE STENOSIS, ETIOLOGY OF CARDIAC VALVE DISEASE UNSPECIFIED: ICD-10-CM

## 2022-09-07 DIAGNOSIS — Z95.2 S/P AVR: ICD-10-CM

## 2022-09-07 LAB
BH CV ECHO MEAS - ACS: 1.51 CM
BH CV ECHO MEAS - AO MAX PG: 20.1 MMHG
BH CV ECHO MEAS - AO MEAN PG: 12.7 MMHG
BH CV ECHO MEAS - AO ROOT DIAM: 2.03 CM
BH CV ECHO MEAS - AO V2 MAX: 224 CM/SEC
BH CV ECHO MEAS - AO V2 VTI: 42 CM
BH CV ECHO MEAS - AVA(I,D): 1.15 CM2
BH CV ECHO MEAS - EDV(CUBED): 30.4 ML
BH CV ECHO MEAS - EDV(MOD-SP4): 34 ML
BH CV ECHO MEAS - EF(MOD-SP4): 44.4 %
BH CV ECHO MEAS - EF_3D-VOL: 52 %
BH CV ECHO MEAS - ESV(CUBED): 6.3 ML
BH CV ECHO MEAS - ESV(MOD-SP4): 18.9 ML
BH CV ECHO MEAS - FS: 40.7 %
BH CV ECHO MEAS - IVS/LVPW: 0.82 CM
BH CV ECHO MEAS - IVSD: 1.07 CM
BH CV ECHO MEAS - LA DIMENSION: 4.1 CM
BH CV ECHO MEAS - LAT PEAK E' VEL: 6 CM/SEC
BH CV ECHO MEAS - LV DIASTOLIC VOL/BSA (35-75): 21.3 CM2
BH CV ECHO MEAS - LV MASS(C)D: 113 GRAMS
BH CV ECHO MEAS - LV MAX PG: 2.9 MMHG
BH CV ECHO MEAS - LV MEAN PG: 2.02 MMHG
BH CV ECHO MEAS - LV SYSTOLIC VOL/BSA (12-30): 11.8 CM2
BH CV ECHO MEAS - LV V1 MAX: 84.8 CM/SEC
BH CV ECHO MEAS - LV V1 VTI: 16.7 CM
BH CV ECHO MEAS - LVIDD: 3.1 CM
BH CV ECHO MEAS - LVIDS: 1.85 CM
BH CV ECHO MEAS - LVOT AREA: 2.9 CM2
BH CV ECHO MEAS - LVOT DIAM: 1.92 CM
BH CV ECHO MEAS - LVPWD: 1.3 CM
BH CV ECHO MEAS - MED PEAK E' VEL: 5.9 CM/SEC
BH CV ECHO MEAS - MV A MAX VEL: 111.8 CM/SEC
BH CV ECHO MEAS - MV DEC SLOPE: 625.1 CM/SEC2
BH CV ECHO MEAS - MV E MAX VEL: 101 CM/SEC
BH CV ECHO MEAS - MV E/A: 0.9
BH CV ECHO MEAS - RAP SYSTOLE: 10 MMHG
BH CV ECHO MEAS - RVDD: 2.39 CM
BH CV ECHO MEAS - RVSP: 38 MMHG
BH CV ECHO MEAS - SI(MOD-SP4): 9.5 ML/M2
BH CV ECHO MEAS - SV(LVOT): 48.3 ML
BH CV ECHO MEAS - SV(MOD-SP4): 15.1 ML
BH CV ECHO MEAS - TR MAX PG: 28 MMHG
BH CV ECHO MEAS - TR MAX VEL: 264.7 CM/SEC
BH CV ECHO MEASUREMENTS AVERAGE E/E' RATIO: 16.97
LEFT ATRIUM VOLUME INDEX: 24.5 ML/M2
MAXIMAL PREDICTED HEART RATE: 140 BPM
STRESS TARGET HR: 119 BPM

## 2022-09-07 PROCEDURE — 93306 TTE W/DOPPLER COMPLETE: CPT | Performed by: INTERNAL MEDICINE

## 2022-09-07 PROCEDURE — 93306 TTE W/DOPPLER COMPLETE: CPT

## 2022-10-27 ENCOUNTER — TELEPHONE (OUTPATIENT)
Dept: CARDIOLOGY | Facility: CLINIC | Age: 82
End: 2022-10-27

## 2022-10-27 NOTE — TELEPHONE ENCOUNTER
Spoke with patient concerning xarelto samples. Currently xarelto samples are unavailable. Patient reports she still has xaretlo and does not need refills at this time.

## 2022-12-07 DIAGNOSIS — I42.8 NICM (NONISCHEMIC CARDIOMYOPATHY): ICD-10-CM

## 2022-12-07 RX ORDER — FUROSEMIDE 40 MG/1
TABLET ORAL
Qty: 90 TABLET | Refills: 0 | Status: SHIPPED | OUTPATIENT
Start: 2022-12-07 | End: 2023-02-28 | Stop reason: SDUPTHER

## 2022-12-19 ENCOUNTER — TELEPHONE (OUTPATIENT)
Dept: CARDIOLOGY | Facility: CLINIC | Age: 82
End: 2022-12-19

## 2022-12-19 NOTE — TELEPHONE ENCOUNTER
Caller: Bipin Rodrigez    Relationship: Emergency Contact    Best call back number: [phone]   What is the best time to reach you: ANY    Who are you requesting to speak with (clinical staff, provider,  specific staff member): CLINICAL STAFF    Do you know the name of the person who called: AD    What was the call regarding:  PT HAS BEEN IN HOSP FOR 11 DAYS-PT WENT HOME WITH 25MG LISINOPRIL PRESCRIPTION-PTS  STATES SHE WAS ON A 5MG AT ONE POINT IN TIME AND IT CAUSED HER TROUBLE. SHE WAS TAKEN OFF    Do you require a callback: YES    SENT HIGH PRIORITY DUE TO PTS  WAITING ON DIRECTION TO ADMINISTER MEDICATION

## 2022-12-20 NOTE — TELEPHONE ENCOUNTER
I spoke with  patient 's   he said she was discharged from hospital with orders for Lisinopril 25 mg daily, she had previously been taking Lisinopril 5 mg daily.  Her BP  last night 111/61  HR 85  this morning 111/67  HR 82  She had been advised to take Iron supplement twice daily but labs were in normal limits , he asked if is ok for her to continue taking once daily ?  Discharge summary , labs and EKG have been obtained.

## 2022-12-20 NOTE — TELEPHONE ENCOUNTER
I spoke with patients  Bipin , he is cautious about giving her Lisinopril 20 mg tablets. She had not been taking lisinopril 5 mg prior to hospitalization due to low BP . He asked if is ok to give her 5 mg tablet and continue to monitor BP and HR. He is aware to call if necessary.

## 2023-01-17 DIAGNOSIS — I42.8 NICM (NONISCHEMIC CARDIOMYOPATHY): ICD-10-CM

## 2023-01-18 ENCOUNTER — TELEPHONE (OUTPATIENT)
Dept: CARDIOLOGY | Facility: CLINIC | Age: 83
End: 2023-01-18
Payer: MEDICARE

## 2023-01-18 DIAGNOSIS — I42.8 NICM (NONISCHEMIC CARDIOMYOPATHY): ICD-10-CM

## 2023-01-18 RX ORDER — POTASSIUM CHLORIDE 750 MG/1
10 TABLET, FILM COATED, EXTENDED RELEASE ORAL DAILY
Qty: 90 TABLET | Refills: 3 | Status: SHIPPED | OUTPATIENT
Start: 2023-01-18

## 2023-01-18 RX ORDER — POTASSIUM CHLORIDE 750 MG/1
TABLET, FILM COATED, EXTENDED RELEASE ORAL
Qty: 90 TABLET | Refills: 0 | Status: SHIPPED | OUTPATIENT
Start: 2023-01-18

## 2023-01-18 NOTE — TELEPHONE ENCOUNTER
Caller: Bipin Rodrigez    Relationship: Emergency Contact     Best call back number: [phone]    What is the best time to reach you: ANY    What was the call regarding: PT'S  WAS CALLING TO CHECK STATUS OF PRESCRIPTION REFILL FOR POTASSIUM CHLORIDE. HE STATED HE WOULD LIKE A CALL BACK ONCE IT HAS BEEN SUBMITTED. PT HAS MORE THAN 3 DAYS LEFT    Do you require a callback: YES

## 2023-02-03 ENCOUNTER — TELEPHONE (OUTPATIENT)
Dept: CARDIOLOGY | Facility: CLINIC | Age: 83
End: 2023-02-03
Payer: MEDICARE

## 2023-02-03 NOTE — TELEPHONE ENCOUNTER
Hold lisinopril until systolic bp reaches 120. Then resume 2.5 mg lisinopril.     Adequate fluid intake. She also takes lasix 40 mg. If bp remains low, can try reducing that to 20 mg over the weekend. Call on Monday with update on symptoms.     Labs 1/17/23 stable, Na slightly low at 133.

## 2023-02-03 NOTE — TELEPHONE ENCOUNTER
Patient had called with generalized weakness , adds that she feels so weak she cant stand long enough to shower .  This morning Her /57 HR 89      Last night BP  111/64  HR 79  She is taking Coreg 3.125 mg  BID and Lisinopril  5 mg daily ,which she splits in half at times for low BP.

## 2023-02-03 NOTE — TELEPHONE ENCOUNTER
I spoke with patient and she is aware to hold Lisinopril if systolic reading is below 120 and to take 1/2 tablet Lisinopril if over 120.  She is aware to cut Lasix in half to 20 mg daily over the weekend  . She will call us on Monday with update on symptoms.

## 2023-02-03 NOTE — TELEPHONE ENCOUNTER
I spoke with patient, she is having generalized weakness, she feels she cant stand for long and has to sit down ,She is currently taking Coreg 3.125 mg BID and is taking Lisinopril 5 mg daily , she does split in half at times if BP low

## 2023-02-06 NOTE — TELEPHONE ENCOUNTER
I spoke with patient about her BP and symptoms.  She reports her BP is 118/63    HR 77  and weakness has improved but she said she has more swelling with weight gain 1-2 pounds and more difficulty urinating with half tablet of Lasix.

## 2023-02-22 ENCOUNTER — OFFICE VISIT (OUTPATIENT)
Dept: CARDIOLOGY | Facility: CLINIC | Age: 83
End: 2023-02-22
Payer: MEDICARE

## 2023-02-22 DIAGNOSIS — I49.5 SSS (SICK SINUS SYNDROME): ICD-10-CM

## 2023-02-22 DIAGNOSIS — I47.1 SVT (SUPRAVENTRICULAR TACHYCARDIA): Primary | ICD-10-CM

## 2023-02-22 PROCEDURE — 93288 INTERROG EVL PM/LDLS PM IP: CPT | Performed by: INTERNAL MEDICINE

## 2023-02-27 ENCOUNTER — TELEPHONE (OUTPATIENT)
Dept: CARDIOLOGY | Facility: CLINIC | Age: 83
End: 2023-02-27
Payer: MEDICARE

## 2023-02-28 ENCOUNTER — OFFICE VISIT (OUTPATIENT)
Dept: CARDIOLOGY | Facility: CLINIC | Age: 83
End: 2023-02-28
Payer: MEDICARE

## 2023-02-28 VITALS
HEART RATE: 90 BPM | HEIGHT: 64 IN | WEIGHT: 128.6 LBS | BODY MASS INDEX: 21.95 KG/M2 | SYSTOLIC BLOOD PRESSURE: 140 MMHG | DIASTOLIC BLOOD PRESSURE: 76 MMHG

## 2023-02-28 DIAGNOSIS — Z95.2 S/P AVR: ICD-10-CM

## 2023-02-28 DIAGNOSIS — Z79.01 CHRONIC ANTICOAGULATION: ICD-10-CM

## 2023-02-28 DIAGNOSIS — I48.0 PAROXYSMAL ATRIAL FIBRILLATION: ICD-10-CM

## 2023-02-28 DIAGNOSIS — I49.5 SSS (SICK SINUS SYNDROME): ICD-10-CM

## 2023-02-28 DIAGNOSIS — E78.00 HYPERCHOLESTEREMIA: ICD-10-CM

## 2023-02-28 DIAGNOSIS — I42.8 NICM (NONISCHEMIC CARDIOMYOPATHY): ICD-10-CM

## 2023-02-28 DIAGNOSIS — Z95.0 PRESENCE OF BIVENTRICULAR CARDIAC PACEMAKER: ICD-10-CM

## 2023-02-28 DIAGNOSIS — I35.0 AORTIC VALVE STENOSIS, ETIOLOGY OF CARDIAC VALVE DISEASE UNSPECIFIED: Primary | ICD-10-CM

## 2023-02-28 PROCEDURE — 93000 ELECTROCARDIOGRAM COMPLETE: CPT | Performed by: NURSE PRACTITIONER

## 2023-02-28 PROCEDURE — 99214 OFFICE O/P EST MOD 30 MIN: CPT | Performed by: NURSE PRACTITIONER

## 2023-02-28 RX ORDER — FUROSEMIDE 40 MG/1
40 TABLET ORAL DAILY
Qty: 90 TABLET | Refills: 2 | Status: SHIPPED | OUTPATIENT
Start: 2023-02-28

## 2023-02-28 RX ORDER — PRAVASTATIN SODIUM 40 MG
40 TABLET ORAL DAILY
Qty: 90 TABLET | Refills: 2 | Status: SHIPPED | OUTPATIENT
Start: 2023-02-28

## 2023-03-13 ENCOUNTER — TELEPHONE (OUTPATIENT)
Dept: CARDIOLOGY | Facility: CLINIC | Age: 83
End: 2023-03-13
Payer: MEDICARE

## 2023-03-13 NOTE — TELEPHONE ENCOUNTER
Received notification of BSC monitor shipment. Letter mailed to alert patient of shipment, and numbers to reach me for assistance with set up.

## 2023-03-13 NOTE — TELEPHONE ENCOUNTER
Letter mailed to pt at the request of Ruth Cotter RN regarding remote home monitoring of device.

## 2023-03-16 ENCOUNTER — TELEPHONE (OUTPATIENT)
Dept: CARDIOLOGY | Facility: CLINIC | Age: 83
End: 2023-03-16
Payer: MEDICARE

## 2023-03-16 NOTE — TELEPHONE ENCOUNTER
Called to alert we had received first remote transmission. Spoke with Bipin who verbalized understanding.

## 2023-06-02 ENCOUNTER — TELEPHONE (OUTPATIENT)
Dept: CARDIOLOGY | Facility: CLINIC | Age: 83
End: 2023-06-02
Payer: MEDICARE

## 2023-06-02 NOTE — TELEPHONE ENCOUNTER
Patient called stating that she has gained 6 lbs over the last couple of days and states that she has swelling in her abdomen and her arms. She states that her clothes are feeling too tight. She states that she is not having any shortness of breath unless she exerts herself. She is asking if she can increase her furosemide? She take 40 mg daily.

## 2023-06-02 NOTE — TELEPHONE ENCOUNTER
Patient is aware she can take extra 1/2 tablet Lasix as needed for swelling.  She is aware to stay well hydrated and continue to monitor BP .

## 2023-06-02 NOTE — TELEPHONE ENCOUNTER
Patient has called and is experiencing swelling  With weight gain of 6 pounds over the last couple days . She has asked if she can increase Lasix to 40 mg daily ?

## 2023-06-02 NOTE — TELEPHONE ENCOUNTER
Let's start with an extra 20 mg PRN added to 40 mg. Monitor bp as she has a tendency to hypotension and BRIE is 1.2.

## 2023-06-05 NOTE — TELEPHONE ENCOUNTER
It looks like Lakia had given recommendations on this to Mila for patient on Friday. I am sorry, I did not realize there was another message in there.

## 2023-09-13 ENCOUNTER — OFFICE VISIT (OUTPATIENT)
Dept: CARDIOLOGY | Facility: CLINIC | Age: 83
End: 2023-09-13
Payer: MEDICARE

## 2023-09-13 VITALS
HEART RATE: 78 BPM | BODY MASS INDEX: 21.61 KG/M2 | WEIGHT: 126.6 LBS | DIASTOLIC BLOOD PRESSURE: 68 MMHG | SYSTOLIC BLOOD PRESSURE: 126 MMHG | HEIGHT: 64 IN

## 2023-09-13 DIAGNOSIS — I10 ESSENTIAL HYPERTENSION: ICD-10-CM

## 2023-09-13 DIAGNOSIS — I48.0 PAF (PAROXYSMAL ATRIAL FIBRILLATION): Primary | ICD-10-CM

## 2023-09-13 DIAGNOSIS — E78.00 HYPERCHOLESTEREMIA: ICD-10-CM

## 2023-09-13 DIAGNOSIS — I49.5 SSS (SICK SINUS SYNDROME): ICD-10-CM

## 2023-09-13 DIAGNOSIS — I44.2 COMPLETE HEART BLOCK: Primary | ICD-10-CM

## 2023-09-13 DIAGNOSIS — I42.8 NICM (NONISCHEMIC CARDIOMYOPATHY): ICD-10-CM

## 2023-09-13 RX ORDER — PRAVASTATIN SODIUM 40 MG
40 TABLET ORAL DAILY
Qty: 90 TABLET | Refills: 3 | Status: SHIPPED | OUTPATIENT
Start: 2023-09-13

## 2023-09-13 RX ORDER — CARVEDILOL 3.12 MG/1
3.12 TABLET ORAL 2 TIMES DAILY
Qty: 180 TABLET | Refills: 3 | Status: SHIPPED | OUTPATIENT
Start: 2023-09-13

## 2023-09-13 RX ORDER — FUROSEMIDE 40 MG/1
40 TABLET ORAL DAILY
Qty: 90 TABLET | Refills: 3 | Status: SHIPPED | OUTPATIENT
Start: 2023-09-13

## 2023-09-13 RX ORDER — POTASSIUM CHLORIDE 750 MG/1
10 TABLET, FILM COATED, EXTENDED RELEASE ORAL DAILY
Qty: 90 TABLET | Refills: 3 | Status: SHIPPED | OUTPATIENT
Start: 2023-09-13

## 2023-09-13 NOTE — PROGRESS NOTES
"Chief Complaint   Patient presents with    Follow-up     Cardiac management    Pacemaker Check     [place] [employer] device last check was February 22,2023  Is scheduled today for pacer check    Atrial Fibrillation     Patient  reports no symptoms of AFIB. She adds \" I have felt good this weak and have slept better  and feeling I have more energy'    LABS     Current labs march 1, 2023 are on chart    Med Refill     Requests  refills on Potassium,Coreg, Pravastatin, Lasix and Xarelto 90 day supply to [facility]        HPI:  HPI   Debbie Rodrigez is a 82 y.o. female with severe AS for which she underwent aortic valve replacement in 2017.  She developed atrial fibrillation and was treated for the same.  Her LV function declined and with LBBB, she underwent BIV PPM placement.  She continued to have arrhythmias, atrial flutter and was referred to Dr. Chavira who performed AV ashley ablation along with right atrial isthmus ablation. She has been anticoagulated.      Echocardiogram done on 5/17/2021 showed LVEF stable at 51-55% and mild to moderate AAS with BRIE of 1.2 cm².  RVSP was 36 mmHg.  On 9/7/2022 repeat echo showed LVEF stable at 51-55%.  Bioprosthetic aortic valve present with BRIE 1.2 cm² and pressure 13 over 20 mmHg.  RVSP was 38 mmHg.     Today she returns to the office for a follow up visit.  Labs 3/2023 with PCP: CBC and CMP normal. Glucose elevated 122, GFR 74, lipids normal.  She had device checked today which showed 4.5-year battery life and AMS less than 1%. She denies chest pain, pressure, palpitations, tightness, dizziness, SOB. She states she is walking 0.2 miles everyday and has felt good this week. Her BP has been staying in the 110s per BP log and she has been holding Coreg unless SBP is greater than 120.     Cardiac History:    Past Surgical History:   Procedure Laterality Date    AORTIC VALVE REPAIR/REPLACEMENT N/A 11/10/2017    Procedure: AORTIC VALVE REPAIR/REPLACEMENT WITH MARYSE;  Surgeon: Dmitry ESPINOZA" MD Lisset;  Location: [facility] OR;  Service:     CARDIAC CATHETERIZATION  09/11/2002    Cath- EF 30% Normal Coronaries    CARDIAC CATHETERIZATION  10/20/2017    Normal Coronaries. Sig AI. Unable to cross AV    CARDIAC ELECTROPHYSIOLOGY PROCEDURE N/A 09/07/2018    Procedure: Ablation SVT;  Surgeon: Benito Chavira MD;  Location:  [facility];  Service: Cardiovascular    CARDIOVASCULAR STRESS TEST  05/22/2003    P. Myoview- Normal. Pt had CP    CARDIOVASCULAR STRESS TEST  03/12/2013    L. Myoview- Pt had CP. Negative    CONVERTED (HISTORICAL) CARDIOVASCULAR STUDIES  01/22/2014    MUGA- EF 45%    CONVERTED (HISTORICAL) HOLTER  10/10/2002    Holter- AVG HR 70 BPM    ECHO - CONVERTED  09/15/2003    Echo- ([facility]) EF 55%. LBBB. RVSP- 42 mmHg    ECHO - CONVERTED  03/12/2013    Echo- EF 60%. BRIE- 1.6 mmHg. RVSP- 42 mmHg    ECHO - CONVERTED  10/15/2013    Echo-EF 35%, mod. MR, & AI. BRIE- 1.4 Cm2. RVSP- 40 mmHg    ECHO - CONVERTED  06/09/2015    Echo- EF 45-50%, mod MR, Mod AS, BRIE 1.1 Cm2, Mod AR, RVSP- 35 mmHg    ECHO - CONVERTED  02/21/2017    EF 55-60%, BRIE 1.0    ECHO - CONVERTED  12/15/2017    @[facility]. EF 35-40%. LBBB. Mild- Mod MR    ECHO - CONVERTED  05/01/2018    EF 45-50%. Mod MR. RVSP- 35 mmHg    ECHO - CONVERTED  07/22/2019    EF 50-55%.AVR. BRIE- 1.4 Cm2, Mild MR. RVSP- 29 mmHg.    ECHO - CONVERTED  07/15/2021    EF 55%. AVR. BRIE- 1.2 Cm2. Mild MR. LA- 3.9 Cm. RVSP- 36 mmHg    ECHO - CONVERTED  09/07/2022    EF 55%. LA- 4.1.AVR.BRIE- 1.2 .Trace-Mild MR. RVSP- 38 mmHg    PACEMAKER IMPLANTATION  12/19/2017    [place] BIV PPM by Dr. Dickey    MARYSE  09/06/2013    MARYSE- < 35%, Moderate AS. AI and MR. No Thrombus       Current Outpatient Medications   Medication Sig Dispense Refill    acetaminophen (TYLENOL) 325 MG tablet Take 2 tablets by mouth Every 6 (Six) Hours As Needed for Mild Pain.      aspirin 81 MG EC tablet Take 1 tablet by mouth Daily.      calcium carbonate (OS-PABLO) 600 MG tablet Take 1 tablet by  mouth Daily. CALCIUM 600 MG AND VITAMIN D3 800 IU      carvedilol (COREG) 3.125 MG tablet Take 1 tablet by mouth 2 (Two) Times a Day. 180 tablet 3    Cyanocobalamin (VITAMIN B12 PO) 1,500 mcg. 1/2 tablet daily      docusate sodium (COLACE) 100 MG capsule Take 1 capsule by mouth 2 (Two) Times a Day As Needed.      ferrous sulfate 324 (65 Fe) MG tablet delayed-release EC tablet Take 1 tablet by mouth Daily With Breakfast.      furosemide (LASIX) 40 MG tablet Take 1 tablet by mouth Daily. 90 tablet 3    ipratropium (ATROVENT) 0.03 % nasal spray 4 sprays into the nostril(s) as directed by provider 2 (Two) Times a Day As Needed for Rhinitis.      levothyroxine (SYNTHROID, LEVOTHROID) 88 MCG tablet Take 1 tablet by mouth Daily.      Multiple Vitamin (MULTI VITAMIN DAILY PO) Take 1 tablet by mouth Daily.      potassium chloride 10 MEQ CR tablet Take 1 tablet by mouth Daily. 90 tablet 3    pravastatin (PRAVACHOL) 40 MG tablet Take 1 tablet by mouth Daily. 90 tablet 3    risperiDONE (risperDAL) 4 MG tablet Take 0.5 tablets by mouth 2 (Two) Times a Day.      rivaroxaban (XARELTO) 20 MG tablet Take 1 tablet by mouth Daily. Hold until patient requests 14 tablet 0    vitamin C (ASCORBIC ACID) 500 MG tablet Take 1 tablet by mouth Daily.       No current facility-administered medications for this visit.       Codeine, Penicillin v potassium, Sulfamethoxazole-trimethoprim, Penicillins, and Sulfa antibiotics    Past Medical History:   Diagnosis Date    Aortic valve stenosis     Bipolar affective disorder     CHF (congestive heart failure)     Diabetes mellitus     DX'D TYPE II NIDDM APPROX 3-4 YEARS AGO, NO MEDS, CHECKS BLOOD SUGAR 2-3 TIMES PER WEEK, AVERAGE     CLAYTON (generalized anxiety disorder)     H/O dilation and curettage     H/O eye surgery     Eye surgery- bilateral    H/O:      H/O: hysterectomy     s/p appendectomy    Hallucinations     History of AAA (abdominal aortic aneurysm) repair     (pt doesn't  "recall- on PCP note)    Hyperlipidemia     Hypertension     Hypothyroidism     Overactive bladder     Stroke     TIA 3 YEARS AGO, MEMORY LOSS     SVT (supraventricular tachycardia)     Tachycardia     Wears dentures     PARTIALS ON TOP AND BOTTOM     Wears glasses        Social History     Socioeconomic History    Marital status:     Number of children: 1   Tobacco Use    Smoking status: Never    Smokeless tobacco: Never   Vaping Use    Vaping Use: Never used   Substance and Sexual Activity    Alcohol use: Never    Drug use: No    Sexual activity: Defer       Family History   Problem Relation Age of Onset    Heart disease Mother     Diabetes Mother     Cancer Father         Bone       Vitals:   /68 (BP Location: Left arm, Patient Position: Sitting)   Pulse 78   Ht 162.6 cm (64\")   Wt 57.4 kg (126 lb 9.6 oz)   BMI 21.73 kg/m²     Physical Exam  Vitals and nursing note reviewed.   Constitutional:       Appearance: Normal appearance.   Neck:      Vascular: No carotid bruit.   Cardiovascular:      Rate and Rhythm: Normal rate.      Pulses: Normal pulses.      Heart sounds: Murmur heard.   Systolic murmur is present with a grade of 3/6.     No friction rub. No gallop.      Comments: BIV PPM in place  Pulmonary:      Effort: Pulmonary effort is normal.      Breath sounds: Normal breath sounds and air entry.   Musculoskeletal:      Right lower leg: No edema.      Left lower leg: No edema.   Skin:     General: Skin is warm and dry.      Capillary Refill: Capillary refill takes less than 2 seconds.   Neurological:      Mental Status: She is alert and oriented to person, place, and time.   Psychiatric:         Behavior: Behavior normal.     Procedures     Assessment & Plan     Diagnoses and all orders for this visit:    1. PAF (paroxysmal atrial fibrillation) (Primary)  -     carvedilol (COREG) 3.125 MG tablet; Take 1 tablet by mouth 2 (Two) Times a Day.  Dispense: 180 tablet; Refill: 3  -     rivaroxaban " (XARELTO) 20 MG tablet; Take 1 tablet by mouth Daily. Hold until patient requests  Dispense: 14 tablet; Refill: 0    2. Essential hypertension  -     carvedilol (COREG) 3.125 MG tablet; Take 1 tablet by mouth 2 (Two) Times a Day.  Dispense: 180 tablet; Refill: 3    3. NICM (nonischemic cardiomyopathy)  -     potassium chloride 10 MEQ CR tablet; Take 1 tablet by mouth Daily.  Dispense: 90 tablet; Refill: 3  -     furosemide (LASIX) 40 MG tablet; Take 1 tablet by mouth Daily.  Dispense: 90 tablet; Refill: 3    4. Hypercholesteremia  -     pravastatin (PRAVACHOL) 40 MG tablet; Take 1 tablet by mouth Daily.  Dispense: 90 tablet; Refill: 3    5. SSS (sick sinus syndrome)    PAF/SSS/PPM  - s/p AV ashley ablation, BiV PPM remotely monitored  - In office interrogation today after bypass showed less than 1% AMS and 4.5-year battery life  - Continue Xarelto and carvedilol    HTN  - BP has been in the 110s per log of home readings  - She has been holding Coreg unless SBP is greater than 120    NICM  - Echo 9/2022 shows EF 55%  - Continue beta-blocker and loop diuretic    Hypercholesterolemia  - Lipids controlled  - Continue pravastatin    Stable cardiac wise.  No changes made today.  Refills sent in for cardiac medications    Visit Diagnoses:    ICD-10-CM ICD-9-CM   1. PAF (paroxysmal atrial fibrillation)  I48.0 427.31   2. Essential hypertension  I10 401.9   3. NICM (nonischemic cardiomyopathy)  I42.8 425.4   4. Hypercholesteremia  E78.00 272.0   5. SSS (sick sinus syndrome)  I49.5 427.81       Meds Ordered During Visit:  New Medications Ordered This Visit   Medications    carvedilol (COREG) 3.125 MG tablet     Sig: Take 1 tablet by mouth 2 (Two) Times a Day.     Dispense:  180 tablet     Refill:  3    potassium chloride 10 MEQ CR tablet     Sig: Take 1 tablet by mouth Daily.     Dispense:  90 tablet     Refill:  3    pravastatin (PRAVACHOL) 40 MG tablet     Sig: Take 1 tablet by mouth Daily.     Dispense:  90 tablet      Refill:  3    furosemide (LASIX) 40 MG tablet     Sig: Take 1 tablet by mouth Daily.     Dispense:  90 tablet     Refill:  3    rivaroxaban (XARELTO) 20 MG tablet     Sig: Take 1 tablet by mouth Daily. Hold until patient requests     Dispense:  14 tablet     Refill:  0     Order Specific Question:   Lot Number?     Answer:   [id number]     Order Specific Question:   Expiration Date?     Answer:   8/31/2025     Order Specific Question:   Quantity     Answer:   14       Follow Up Appointment:   Return in about 6 months (around 3/13/2024), or sooner if needed.           This document has been electronically signed by TAYO Price  September 13, 2023 14:55 EDT    Dictated Utilizing Dragon Dictation: Part of this note may be an electronic transcription/translation of spoken language to printed text using the Dragon Dictation System.

## 2023-11-14 ENCOUNTER — TELEPHONE (OUTPATIENT)
Dept: CARDIOLOGY | Facility: CLINIC | Age: 83
End: 2023-11-14
Payer: MEDICARE

## 2023-11-14 ENCOUNTER — TELEPHONE (OUTPATIENT)
Dept: CARDIOLOGY | Facility: CLINIC | Age: 83
End: 2023-11-14

## 2023-11-14 NOTE — TELEPHONE ENCOUNTER
Patient aware samples of Xarelto 10 mg with instruction to take 2 tablets daily to equal 20 mg daily . She verbalized understanding.

## 2023-11-14 NOTE — TELEPHONE ENCOUNTER
Caller: Bipin Rodrigez     Relationship:     Callback number: [phone]    Is it ok to leave a message: [x] Yes [] No    Requested medication for samples: xarelto 20mg    How much medication does the patient currently have left: 1 BOTTLE OF SAMPLES (15), IS NEEDING TO ADD TO THIS TO MAKE A WEEKS WORTH     Who will be picking up the samples: EITHER  OR SELF     Do you need information about patient financial assistance for this medication: [] Yes [x] No    PLEASE CALL PT BACK AND ADVISE.

## 2023-11-14 NOTE — TELEPHONE ENCOUNTER
Samples of Xarelto 10 mg given with instruction to take 2 tablets once daily to equal 20 mg daily. She verbalized understanding.   LOT # [id number]  Expiration # 09/2024

## 2023-12-11 ENCOUNTER — TELEPHONE (OUTPATIENT)
Dept: CARDIOLOGY | Facility: CLINIC | Age: 83
End: 2023-12-11
Payer: MEDICARE

## 2023-12-11 NOTE — TELEPHONE ENCOUNTER
Patient came into office requesting samples. We are out of 20 mg Xarelto some samples of 10 mg Xarelto have been set aside with instructions for patient to take 2 tablets daily.     x28 tabs, LOT: [id number], EXP: 09/24

## 2024-02-08 ENCOUNTER — TELEPHONE (OUTPATIENT)
Dept: CARDIOLOGY | Facility: CLINIC | Age: 84
End: 2024-02-08
Payer: MEDICARE

## 2024-02-08 NOTE — TELEPHONE ENCOUNTER
Caller: GEE MORALES    Relationship:PATIENT      Callback number: [phone] (home)     Is it ok to leave a message: [x] Yes [] No    Requested medication for samples: XARELTO     How much medication does the patient currently have left: APPROX 1 WEEK SUPPLY    Who will be picking up the samples: PATIENT    Do you need information about patient financial assistance for this medication: [] Yes [x] No    Additional details provided: PATIENT WOULD LIKE TO  ON 2-12-24. PLEASE CONTACT PATIENT AND ADVISE HER WHEN THEY ARE READY FOR   
Patient made aware we are out of 20 mg but did have 15 and 2.5 mg tablets available. They agreed to them and will be picking them up following Monday.    Xarelto 15 mg x28 tabs, LOT: [id number], EXP: 08-24    Xarelto 2.5 mg x56 tabs, LOT: [id number], EXP: 10/2024  
1.47

## 2024-02-22 ENCOUNTER — HOSPITAL ENCOUNTER (EMERGENCY)
Facility: HOSPITAL | Age: 84
Discharge: HOME | End: 2024-02-22
Attending: STUDENT IN AN ORGANIZED HEALTH CARE EDUCATION/TRAINING PROGRAM
Payer: MEDICARE

## 2024-02-22 ENCOUNTER — APPOINTMENT (OUTPATIENT)
Dept: CT IMAGING | Facility: HOSPITAL | Age: 84
End: 2024-02-22
Payer: MEDICARE

## 2024-02-22 ENCOUNTER — TELEPHONE (OUTPATIENT)
Dept: CARDIOLOGY | Facility: CLINIC | Age: 84
End: 2024-02-22
Payer: MEDICARE

## 2024-02-22 VITALS
HEART RATE: 85 BPM | HEIGHT: 64 IN | OXYGEN SATURATION: 100 % | RESPIRATION RATE: 17 BRPM | DIASTOLIC BLOOD PRESSURE: 52 MMHG | TEMPERATURE: 97.8 F | BODY MASS INDEX: 20.66 KG/M2 | WEIGHT: 121 LBS | SYSTOLIC BLOOD PRESSURE: 146 MMHG
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
## 2024-02-22 DIAGNOSIS — S32.029A CLOSED FRACTURE OF SECOND LUMBAR VERTEBRA, UNSPECIFIED FRACTURE MORPHOLOGY, INITIAL ENCOUNTER: Primary | ICD-10-CM

## 2024-02-22 PROCEDURE — 72192 CT PELVIS W/O DYE: CPT | Performed by: RADIOLOGY

## 2024-02-22 PROCEDURE — 72131 CT LUMBAR SPINE W/O DYE: CPT

## 2024-02-22 PROCEDURE — 72131 CT LUMBAR SPINE W/O DYE: CPT | Performed by: RADIOLOGY

## 2024-02-22 PROCEDURE — 72192 CT PELVIS W/O DYE: CPT

## 2024-02-22 PROCEDURE — 99284 EMERGENCY DEPT VISIT MOD MDM: CPT

## 2024-02-22 NOTE — TELEPHONE ENCOUNTER
Patient's , Norbert, called earlier and states that patient fell and imaging center in [facility] was wanting to do an MRI, but patient has pacemaker. He states that it has not been scheduled. He was made aware that usually they send over a request form to see if PPM is MRI compatible.    Now it appears that patient is in ER in [place].

## 2024-02-22 NOTE — ED PROVIDER NOTES
Subjective   History of Present Illness  83-year-old female with past medical history of aortic valve stenosis, bipolar disorder, CHF, diabetes, generalized anxiety disorder, AAA, hyperlipidemia, hypothyroidism, stroke, and SVT presents to the emergency room after a fall she sustained last Wednesday after tripping outside while pulling a tree.  She states when she fell she fell straight back on her back.  She denies hitting her head or loss of consciousness.  She states since that time her lower back has been bothering her.  She denies urinary retention, fecal incontinence, saddle anesthesia, lower extremity weakness, previous back surgery or trauma.  Aggravating factors include movement.  Denies any alleviating factors.  Denies any other complaints or concerns at this time.    History provided by:  Patient and spouse   used: No        Review of Systems   Constitutional: Negative.  Negative for fever.   HENT: Negative.     Respiratory: Negative.     Cardiovascular: Negative.  Negative for chest pain.   Gastrointestinal: Negative.  Negative for abdominal pain.   Endocrine: Negative.    Genitourinary: Negative.  Negative for dysuria.   Musculoskeletal:  Positive for back pain. Negative for neck pain.   Skin: Negative.    Neurological: Negative.  Negative for headaches.   Psychiatric/Behavioral: Negative.     All other systems reviewed and are negative.      Past Medical History:   Diagnosis Date    Aortic valve stenosis     Bipolar affective disorder     CHF (congestive heart failure)     Diabetes mellitus     DX'D TYPE II NIDDM APPROX 3-4 YEARS AGO, NO MEDS, CHECKS BLOOD SUGAR 2-3 TIMES PER WEEK, AVERAGE     CLAYTON (generalized anxiety disorder)     H/O dilation and curettage     H/O eye surgery     Eye surgery- bilateral    H/O:      H/O: hysterectomy     s/p appendectomy    Hallucinations     History of AAA (abdominal aortic aneurysm) repair     (pt doesn't recall- on PCP note)     Hyperlipidemia     Hypertension     Hypothyroidism     Overactive bladder     Stroke     TIA 3 YEARS AGO, MEMORY LOSS     SVT (supraventricular tachycardia)     Tachycardia     Wears dentures     PARTIALS ON TOP AND BOTTOM     Wears glasses        Allergies   Allergen Reactions    Codeine GI Intolerance    Penicillin V Potassium Nausea And Vomiting    Sulfamethoxazole-Trimethoprim Nausea And Vomiting    Penicillins Rash    Sulfa Antibiotics Rash       Past Surgical History:   Procedure Laterality Date    AORTIC VALVE REPAIR/REPLACEMENT N/A 11/10/2017    Procedure: AORTIC VALVE REPAIR/REPLACEMENT WITH MARYSE;  Surgeon: Dmitry Darling MD;  Location:  [facility] OR;  Service:     CARDIAC CATHETERIZATION  09/11/2002    Cath- EF 30% Normal Coronaries    CARDIAC CATHETERIZATION  10/20/2017    Normal Coronaries. Sig AI. Unable to cross AV    CARDIAC ELECTROPHYSIOLOGY PROCEDURE N/A 09/07/2018    Procedure: Ablation SVT;  Surgeon: Benito Chavira MD;  Location:  [facility];  Service: Cardiovascular    CARDIOVASCULAR STRESS TEST  05/22/2003    P. Myoview- Normal. Pt had CP    CARDIOVASCULAR STRESS TEST  03/12/2013    L. Myoview- Pt had CP. Negative    CONVERTED (HISTORICAL) CARDIOVASCULAR STUDIES  01/22/2014    MUGA- EF 45%    CONVERTED (HISTORICAL) HOLTER  10/10/2002    Holter- AVG HR 70 BPM    ECHO - CONVERTED  09/15/2003    Echo- ([facility]) EF 55%. LBBB. RVSP- 42 mmHg    ECHO - CONVERTED  03/12/2013    Echo- EF 60%. BRIE- 1.6 mmHg. RVSP- 42 mmHg    ECHO - CONVERTED  10/15/2013    Echo-EF 35%, mod. MR, & AI. BRIE- 1.4 Cm2. RVSP- 40 mmHg    ECHO - CONVERTED  06/09/2015    Echo- EF 45-50%, mod MR, Mod AS, BRIE 1.1 Cm2, Mod AR, RVSP- 35 mmHg    ECHO - CONVERTED  02/21/2017    EF 55-60%, BRIE 1.0    ECHO - CONVERTED  12/15/2017    @[facility]. EF 35-40%. LBBB. Mild- Mod MR    ECHO - CONVERTED  05/01/2018    EF 45-50%. Mod MR. RVSP- 35 mmHg    ECHO - CONVERTED  07/22/2019    EF 50-55%.AVR. BRIE- 1.4 Cm2, Mild MR. RVSP- 29 mmHg.     ECHO - CONVERTED  07/15/2021    EF 55%. AVR. BRIE- 1.2 Cm2. Mild MR. LA- 3.9 Cm. RVSP- 36 mmHg    ECHO - CONVERTED  09/07/2022    EF 55%. LA- 4.1.AVR.BRIE- 1.2 .Trace-Mild MR. RVSP- 38 mmHg    PACEMAKER IMPLANTATION  12/19/2017    [place] BIV PPM by Dr. Dickey    MARYSE  09/06/2013    MARYSE- < 35%, Moderate AS. AI and MR. No Thrombus       Family History   Problem Relation Age of Onset    Heart disease Mother     Diabetes Mother     Cancer Father         Bone       Social History     Socioeconomic History    Marital status:     Number of children: 1   Tobacco Use    Smoking status: Never    Smokeless tobacco: Never   Vaping Use    Vaping Use: Never used   Substance and Sexual Activity    Alcohol use: Never    Drug use: No    Sexual activity: Defer           Objective   Physical Exam  Vitals and nursing note reviewed.   Constitutional:       General: She is not in acute distress.     Appearance: She is well-developed. She is not diaphoretic.   HENT:      Head: Normocephalic and atraumatic.      Right Ear: External ear normal.      Left Ear: External ear normal.      Nose: Nose normal.   Eyes:      Conjunctiva/sclera: Conjunctivae normal.      Pupils: Pupils are equal, round, and reactive to light.   Neck:      Vascular: No JVD.      Trachea: No tracheal deviation.   Cardiovascular:      Rate and Rhythm: Normal rate and regular rhythm.      Heart sounds: Normal heart sounds. No murmur heard.  Pulmonary:      Effort: Pulmonary effort is normal. No respiratory distress.      Breath sounds: Normal breath sounds. No wheezing.   Abdominal:      General: Bowel sounds are normal.      Palpations: Abdomen is soft.      Tenderness: There is no abdominal tenderness.   Musculoskeletal:         General: No deformity. Normal range of motion.      Cervical back: Normal, normal range of motion and neck supple.      Thoracic back: Normal.      Lumbar back: Tenderness present.   Skin:     General: Skin is warm and dry.       Coloration: Skin is not pale.      Findings: No erythema or rash.   Neurological:      Mental Status: She is alert and oriented to person, place, and time.      Cranial Nerves: No cranial nerve deficit.   Psychiatric:         Behavior: Behavior normal.         Thought Content: Thought content normal.         Splint - Cast - Strapping    Date/Time: 2/22/2024 3:59 PM    Performed by: Cathy Pena PA-C  Authorized by: Anahi Prajapati DO    Consent:     Consent obtained:  Verbal    Consent given by:  Patient    Risks, benefits, and alternatives were discussed: yes      Risks discussed:  Discoloration, numbness, pain and swelling    Alternatives discussed:  No treatment, delayed treatment, alternative treatment, observation and referral  Universal protocol:     Procedure explained and questions answered to patient or proxy's satisfaction: yes      Relevant documents present and verified: yes      Test results available: yes      Imaging studies available: yes      Required blood products, implants, devices, and special equipment available: yes      Site/side marked: yes      Immediately prior to procedure a time out was called: yes      Patient identity confirmed:  Verbally with patient, arm band, provided demographic data and hospital-assigned identification number  Pre-procedure details:     Distal neurologic exam:  Normal    Distal perfusion: distal pulses strong    Procedure details:     Location: lumbar spine.    Supplies used: back brace.    Splint applied and adjusted personally by me: Back brace applied by tech, checked by me..    Post-procedure details:     Distal neurologic exam:  Normal    Distal perfusion: distal pulses strong      Procedure completion:  Tolerated well, no immediate complications    Post-procedure imaging: not applicable    Comments:      Patient tolerated splint application well. No acute complications. Neurovascularly intact.               ED Course  ED Course as of 02/22/24 4977    Thu Feb 22, 2024   1526 CT Pelvis Without Contrast [TK]   1543 CT Lumbar Spine Without Contrast [TK]      ED Course User Index  [TK] Cathy Pena PA-C                                   CT Pelvis Without Contrast   Final Result   No acute process .                   This report was finalized on 2/22/2024 3:23 PM by Nilay Oro M.D..          CT Lumbar Spine Without Contrast   Final Result   Acute compression fracture of the L2 vertebral body   anteriorly.                   This report was finalized on 2/22/2024 3:26 PM by Nilay Oro M.D..                        Medical Decision Making  83-year-old female with past medical history of aortic valve stenosis, bipolar disorder, CHF, diabetes, generalized anxiety disorder, AAA, hyperlipidemia, hypothyroidism, stroke, and SVT presents to the emergency room after a fall she sustained last Wednesday after tripping outside while pulling a tree.  She states when she fell she fell straight back on her back.  She denies hitting her head or loss of consciousness.  She states since that time her lower back has been bothering her.  She denies urinary retention, fecal incontinence, saddle anesthesia, lower extremity weakness, previous back surgery or trauma.  Aggravating factors include movement.  Denies any alleviating factors.  Denies any other complaints or concerns at this time.      Problems Addressed:  Closed fracture of second lumbar vertebra, unspecified fracture morphology, initial encounter: complicated acute illness or injury  Fall, initial encounter: complicated acute illness or injury    Amount and/or Complexity of Data Reviewed  Radiology: ordered. Decision-making details documented in ED Course.        Final diagnoses:   Closed fracture of second lumbar vertebra, unspecified fracture morphology, initial encounter   Fall, initial encounter       ED Disposition  ED Disposition       ED Disposition   Discharge    Condition   Stable    Comment    --               No follow-up provider specified.       Medication List      No changes were made to your prescriptions during this visit.            Cathy Pena PA-C  02/22/24 4506

## 2024-03-01 PROCEDURE — 93294 REM INTERROG EVL PM/LDLS PM: CPT | Performed by: INTERNAL MEDICINE

## 2024-03-01 PROCEDURE — 93296 REM INTERROG EVL PM/IDS: CPT | Performed by: INTERNAL MEDICINE

## 2024-04-09 ENCOUNTER — OFFICE VISIT (OUTPATIENT)
Dept: CARDIOLOGY | Facility: CLINIC | Age: 84
End: 2024-04-09
Payer: MEDICARE

## 2024-04-09 VITALS
BODY MASS INDEX: 20.69 KG/M2 | WEIGHT: 121.2 LBS | HEIGHT: 64 IN | HEART RATE: 83 BPM | DIASTOLIC BLOOD PRESSURE: 60 MMHG | SYSTOLIC BLOOD PRESSURE: 132 MMHG

## 2024-04-09 DIAGNOSIS — Z95.2 S/P AVR: ICD-10-CM

## 2024-04-09 DIAGNOSIS — I48.0 PAF (PAROXYSMAL ATRIAL FIBRILLATION): ICD-10-CM

## 2024-04-09 DIAGNOSIS — I42.8 NICM (NONISCHEMIC CARDIOMYOPATHY): ICD-10-CM

## 2024-04-09 DIAGNOSIS — I10 ESSENTIAL HYPERTENSION: ICD-10-CM

## 2024-04-09 DIAGNOSIS — E78.00 HYPERCHOLESTEREMIA: ICD-10-CM

## 2024-04-09 DIAGNOSIS — Z95.0 PRESENCE OF BIVENTRICULAR CARDIAC PACEMAKER: ICD-10-CM

## 2024-04-09 DIAGNOSIS — I49.5 SSS (SICK SINUS SYNDROME): Primary | ICD-10-CM

## 2024-04-09 PROCEDURE — 93000 ELECTROCARDIOGRAM COMPLETE: CPT | Performed by: NURSE PRACTITIONER

## 2024-04-09 PROCEDURE — 3075F SYST BP GE 130 - 139MM HG: CPT | Performed by: NURSE PRACTITIONER

## 2024-04-09 PROCEDURE — 1159F MED LIST DOCD IN RCRD: CPT | Performed by: NURSE PRACTITIONER

## 2024-04-09 PROCEDURE — 1160F RVW MEDS BY RX/DR IN RCRD: CPT | Performed by: NURSE PRACTITIONER

## 2024-04-09 PROCEDURE — 3078F DIAST BP <80 MM HG: CPT | Performed by: NURSE PRACTITIONER

## 2024-04-09 PROCEDURE — 99214 OFFICE O/P EST MOD 30 MIN: CPT | Performed by: NURSE PRACTITIONER

## 2024-04-09 RX ORDER — PRAVASTATIN SODIUM 40 MG
40 TABLET ORAL DAILY
Qty: 90 TABLET | Refills: 3 | Status: SHIPPED | OUTPATIENT
Start: 2024-04-09

## 2024-04-09 RX ORDER — POTASSIUM CHLORIDE 750 MG/1
10 TABLET, FILM COATED, EXTENDED RELEASE ORAL DAILY
Qty: 90 TABLET | Refills: 3 | Status: SHIPPED | OUTPATIENT
Start: 2024-04-09

## 2024-04-09 RX ORDER — IPRATROPIUM BROMIDE 21 UG/1
4 SPRAY, METERED NASAL 2 TIMES DAILY PRN
Qty: 30 ML | Refills: 2 | Status: SHIPPED | OUTPATIENT
Start: 2024-04-09

## 2024-04-09 RX ORDER — CARVEDILOL 3.12 MG/1
3.12 TABLET ORAL 2 TIMES DAILY
Qty: 180 TABLET | Refills: 3 | Status: SHIPPED | OUTPATIENT
Start: 2024-04-09

## 2024-04-09 RX ORDER — FUROSEMIDE 40 MG/1
40 TABLET ORAL DAILY
Qty: 90 TABLET | Refills: 3 | Status: SHIPPED | OUTPATIENT
Start: 2024-04-09

## 2024-04-09 NOTE — PROGRESS NOTES
"Chief Complaint   Patient presents with    Follow-up     Cardiac management    Pacemaker Check     [employer] device check September 2023     Atrial Fibrillation     Patient reports no  recent symptoms of AFIB    LABS     No current labs. PCP obtained TSH    Med Refill     Patient request  all prescriptions Except Xarelto  be sent in to [facility] .  She wants Xarelto sent in May 2024       Subjective       Debbie Rodrigez is a 83 y.o. female who underwent aortic valve replacement 2017.  She developed a-fib, LV fx declined with LBBB.  Biventricular pacemaker placed.  Arrhythmia persist and she underwent AV ashley ablation along with right atrial isthmus ablation.  She is on anticoagulation therapy. On 9/7/2022 repeat echo showed LVEF stable at 51-55%.  Bioprosthetic aortic valve present with BRIE 1.2 cm² and pressure 13 over 20 mmHg.  RVSP was 38 mmHg.     9/13/2023 [employer] pacemaker interrogation showed 7% atrial and 100% ventricular pacing, less than 1% total AMS.  RV output decreased.  Battery life of 4.5 years remaining was noted. 3/1/2024 remote check normal.    Today she returns to the office for a follow up visit.  She denies recent cardiac symptoms or concerns.  She admits to walking 1/4 mile every day.  With exertion she has increased shortness of breath but admits to a recovering quickly, no worse since prior visit.  She monitors vital signs daily at home.  Heart rate and blood pressure have been in normal range.  No recent change in cardiac medication management noted.  She denies signs of GI bleeding, but admits to \"vaginal cyst\" which causes her some bleeding at times.    Cardiac History:    Past Surgical History:   Procedure Laterality Date    AORTIC VALVE REPAIR/REPLACEMENT N/A 11/10/2017    Procedure: AORTIC VALVE REPAIR/REPLACEMENT WITH MARYSE;  Surgeon: Dmitry Darling MD;  Location: [facility];  Service:     CARDIAC CATHETERIZATION  09/11/2002    Cath- EF 30% Normal Coronaries    CARDIAC " CATHETERIZATION  10/20/2017    Normal Coronaries. Sig AI. Unable to cross AV    CARDIAC ELECTROPHYSIOLOGY PROCEDURE N/A 09/07/2018    Procedure: Ablation SVT;  Surgeon: Benito Chavira MD;  Location: [facility] INVASIVE LOCATION;  Service: Cardiovascular    CARDIOVASCULAR STRESS TEST  05/22/2003    P. Myoview- Normal. Pt had CP    CARDIOVASCULAR STRESS TEST  03/12/2013    L. Myoview- Pt had CP. Negative    CONVERTED (HISTORICAL) CARDIOVASCULAR STUDIES  01/22/2014    MUGA- EF 45%    CONVERTED (HISTORICAL) HOLTER  10/10/2002    Holter- AVG HR 70 BPM    ECHO - CONVERTED  09/15/2003    Echo- ([facility]) EF 55%. LBBB. RVSP- 42 mmHg    ECHO - CONVERTED  03/12/2013    Echo- EF 60%. BRIE- 1.6 mmHg. RVSP- 42 mmHg    ECHO - CONVERTED  10/15/2013    Echo-EF 35%, mod. MR, & AI. BRIE- 1.4 Cm2. RVSP- 40 mmHg    ECHO - CONVERTED  06/09/2015    Echo- EF 45-50%, mod MR, Mod AS, BREI 1.1 Cm2, Mod AR, RVSP- 35 mmHg    ECHO - CONVERTED  02/21/2017    EF 55-60%, BRIE 1.0    ECHO - CONVERTED  12/15/2017    @[facility]. EF 35-40%. LBBB. Mild- Mod MR    ECHO - CONVERTED  05/01/2018    EF 45-50%. Mod MR. RVSP- 35 mmHg    ECHO - CONVERTED  07/22/2019    EF 50-55%.AVR. BRIE- 1.4 Cm2, Mild MR. RVSP- 29 mmHg.    ECHO - CONVERTED  07/15/2021    EF 55%. AVR. BRIE- 1.2 Cm2. Mild MR. LA- 3.9 Cm. RVSP- 36 mmHg    ECHO - CONVERTED  09/07/2022    EF 55%. LA- 4.1.AVR.BRIE- 1.2 .Trace-Mild MR. RVSP- 38 mmHg    PACEMAKER IMPLANTATION  12/19/2017    [place] BIV PPM by Dr. Dickey    MARYSE  09/06/2013    MARYSE- < 35%, Moderate AS. AI and MR. No Thrombus       Current Outpatient Medications   Medication Sig Dispense Refill    acetaminophen (TYLENOL) 325 MG tablet Take 2 tablets by mouth Every 6 (Six) Hours As Needed for Mild Pain.      aspirin 81 MG EC tablet Take 1 tablet by mouth Daily.      calcium carbonate (OS-PABLO) 600 MG tablet Take 1 tablet by mouth Daily. CALCIUM 600 MG AND VITAMIN D3 800 IU      carvedilol (COREG) 3.125 MG tablet Take 1 tablet by mouth 2 (Two) Times a  Day. 180 tablet 3    Cyanocobalamin (VITAMIN B12 PO) 1,500 mcg. 1/2 tablet daily      docusate sodium (COLACE) 100 MG capsule Take 1 capsule by mouth 2 (Two) Times a Day As Needed.      ferrous sulfate 324 (65 Fe) MG tablet delayed-release EC tablet Take 1 tablet by mouth Daily With Breakfast.      furosemide (LASIX) 40 MG tablet Take 1 tablet by mouth Daily. 90 tablet 3    ipratropium (ATROVENT) 0.03 % nasal spray 4 sprays into the nostril(s) as directed by provider 2 (Two) Times a Day As Needed for Rhinitis. 30 mL 2    levothyroxine (SYNTHROID, LEVOTHROID) 88 MCG tablet Take 1 tablet by mouth Daily.      Multiple Vitamin (MULTI VITAMIN DAILY PO) Take 1 tablet by mouth Daily.      potassium chloride 10 MEQ CR tablet Take 1 tablet by mouth Daily. 90 tablet 3    pravastatin (PRAVACHOL) 40 MG tablet Take 1 tablet by mouth Daily. 90 tablet 3    risperiDONE (risperDAL) 4 MG tablet Take 0.5 tablets by mouth 2 (Two) Times a Day.      rivaroxaban (XARELTO) 20 MG tablet Take 1 tablet by mouth Daily. Hold until patient requests 30 tablet 6    vitamin C (ASCORBIC ACID) 500 MG tablet Take 1 tablet by mouth Daily.       No current facility-administered medications for this visit.       Codeine, Penicillin v potassium, Sulfamethoxazole-trimethoprim, Penicillins, and Sulfa antibiotics    Past Medical History:   Diagnosis Date    Aortic valve stenosis     Bipolar affective disorder     CHF (congestive heart failure)     Diabetes mellitus     DX'D TYPE II NIDDM APPROX 3-4 YEARS AGO, NO MEDS, CHECKS BLOOD SUGAR 2-3 TIMES PER WEEK, AVERAGE     CLAYTON (generalized anxiety disorder)     H/O dilation and curettage     H/O eye surgery     Eye surgery- bilateral    H/O:      H/O: hysterectomy     s/p appendectomy    Hallucinations     History of AAA (abdominal aortic aneurysm) repair     (pt doesn't recall- on PCP note)    Hyperlipidemia     Hypertension     Hypothyroidism     Overactive bladder     Stroke     TIA 3 YEARS AGO,  "MEMORY LOSS     SVT (supraventricular tachycardia)     Tachycardia     Wears dentures     PARTIALS ON TOP AND BOTTOM     Wears glasses        Social History     Socioeconomic History    Marital status:     Number of children: 1   Tobacco Use    Smoking status: Never    Smokeless tobacco: Never   Vaping Use    Vaping status: Never Used   Substance and Sexual Activity    Alcohol use: Never    Drug use: No    Sexual activity: Defer       Family History   Problem Relation Age of Onset    Heart disease Mother     Diabetes Mother     Cancer Father         Bone       Review of Systems   HENT:  Negative for nosebleeds.    Cardiovascular:  Positive for leg swelling (mild pedal edema, no worse). Negative for chest pain.   Respiratory:  Positive for shortness of breath. Negative for sleep disturbances due to breathing.    Hematologic/Lymphatic: Positive for bleeding problem (Admits to mild vaginal bleeding at times). Does not bruise/bleed easily.   Musculoskeletal:  Negative for falls.   Gastrointestinal:  Negative for dysphagia, heartburn, melena and nausea.   Genitourinary:  Negative for hematuria.   Neurological:  Negative for dizziness and headaches.   Psychiatric/Behavioral:  Positive for depression. Negative for memory loss and suicidal ideas. The patient is nervous/anxious.         BP Readings from Last 5 Encounters:   04/09/24 132/60   02/22/24 146/52   09/13/23 126/68   02/28/23 140/76   08/10/22 126/64       Wt Readings from Last 5 Encounters:   04/09/24 55 kg (121 lb 3.2 oz)   02/22/24 54.9 kg (121 lb)   09/13/23 57.4 kg (126 lb 9.6 oz)   02/28/23 58.3 kg (128 lb 9.6 oz)   08/10/22 56.2 kg (124 lb)       Objective     /60 (BP Location: Left arm, Patient Position: Sitting)   Pulse 83   Ht 162.6 cm (64\")   Wt 55 kg (121 lb 3.2 oz)   BMI 20.80 kg/m²     Vitals and nursing note reviewed.   Constitutional:       Appearance: Healthy appearance. Not in distress.   Eyes:      Conjunctiva/sclera: " Conjunctivae normal.      Pupils: Pupils are equal, round, and reactive to light.   HENT:      Head: Normocephalic.   Pulmonary:      Effort: Pulmonary effort is normal.      Breath sounds: Normal breath sounds.   Cardiovascular:      PMI at left midclavicular line. Normal rate. Regular rhythm.      Murmurs: There is a grade 2/6 systolic murmur.   Edema:     Feet: bilateral trace edema of the feet.  Abdominal:      General: Bowel sounds are normal.      Palpations: Abdomen is soft.   Musculoskeletal: Normal range of motion.      Cervical back: Normal range of motion and neck supple. Skin:     General: Skin is warm and dry.      Coloration: Skin is pale.   Neurological:      Mental Status: Alert, oriented to person, place, and time and oriented to person, place and time.            ECG 12 Lead    Date/Time: 2024 1:29 PM  Performed by: Jazmin Alston APRN    Authorized by: Jazmin Alston APRN  Comparison: compared with previous ECG from 3/1/2023  Similar to previous ECG  Rhythm: sinus rhythm and paced  BPM: 83  Pacin% capture             Assessment & Plan   Diagnoses and all orders for this visit:    1. SSS (sick sinus syndrome) (Primary)    2. PAF (paroxysmal atrial fibrillation)  -     rivaroxaban (XARELTO) 20 MG tablet; Take 1 tablet by mouth Daily. Hold until patient requests  Dispense: 30 tablet; Refill: 6  -     carvedilol (COREG) 3.125 MG tablet; Take 1 tablet by mouth 2 (Two) Times a Day.  Dispense: 180 tablet; Refill: 3    3. Presence of biventricular cardiac pacemaker    4. S/P AVR    5. Essential hypertension  -     carvedilol (COREG) 3.125 MG tablet; Take 1 tablet by mouth 2 (Two) Times a Day.  Dispense: 180 tablet; Refill: 3    6. Hypercholesteremia  -     pravastatin (PRAVACHOL) 40 MG tablet; Take 1 tablet by mouth Daily.  Dispense: 90 tablet; Refill: 3    7. NICM (nonischemic cardiomyopathy)  -     potassium chloride 10 MEQ CR tablet; Take 1 tablet by mouth Daily.  Dispense: 90 tablet;  Refill: 3  -     furosemide (LASIX) 40 MG tablet; Take 1 tablet by mouth Daily.  Dispense: 90 tablet; Refill: 3    Other orders  -     ipratropium (ATROVENT) 0.03 % nasal spray; 4 sprays into the nostril(s) as directed by provider 2 (Two) Times a Day As Needed for Rhinitis.  Dispense: 30 mL; Refill: 2  -     ECG 12 Lead      SSS/BiV pacemaker/PAF/history AV node ablation  -EKG sinus with ventricular pacing  -Remote monitoring, recent irrigation normal  -In office interrogation scheduled with follow-up visit in 6 months  -Continue carvedilol  -For stroke prevention continue Xarelto    HTN  -BP stable per home monitor  -BP normal today  -Continue carvedilol, Lasix    NICM/history aortic valve replacement  - Echo 9/2022 shows EF 55%  - Continue beta-blocker and loop diuretic  -Next visit consider repeat echocardiogram for surveillance.  Most recent 2021 stable     Hypercholesterolemia  - Continue pravastatin    Of note-patient admits to occasional vaginal bleeding.  I advised patient to follow-up with you in regards.  Patient agrees to contact her office.    I have requested copy of most recent lab results.    6-month follow-up visit scheduled.  Call sooner for concerns.             Electronically signed by TAYO Black,  April 9, 2024 13:35 EDT    Dictated Utilizing Dragon Dictation: Part of this note may be an electronic transcription/translation of spoken language to printed text using the Dragon Dictation System.

## 2024-05-02 ENCOUNTER — TELEPHONE (OUTPATIENT)
Dept: CARDIOLOGY | Facility: CLINIC | Age: 84
End: 2024-05-02
Payer: MEDICARE

## 2024-05-06 ENCOUNTER — TELEPHONE (OUTPATIENT)
Dept: CARDIOLOGY | Facility: CLINIC | Age: 84
End: 2024-05-06
Payer: MEDICARE

## 2024-05-06 DIAGNOSIS — I48.0 PAF (PAROXYSMAL ATRIAL FIBRILLATION): ICD-10-CM

## 2024-08-07 DIAGNOSIS — I48.0 PAF (PAROXYSMAL ATRIAL FIBRILLATION): ICD-10-CM

## 2024-10-23 ENCOUNTER — OFFICE VISIT (OUTPATIENT)
Dept: CARDIOLOGY | Facility: CLINIC | Age: 84
End: 2024-10-23
Payer: MEDICARE

## 2024-10-23 ENCOUNTER — TELEPHONE (OUTPATIENT)
Dept: CARDIOLOGY | Facility: CLINIC | Age: 84
End: 2024-10-23

## 2024-10-23 VITALS
HEART RATE: 78 BPM | DIASTOLIC BLOOD PRESSURE: 70 MMHG | SYSTOLIC BLOOD PRESSURE: 132 MMHG | WEIGHT: 116.2 LBS | BODY MASS INDEX: 19.84 KG/M2 | HEIGHT: 64 IN

## 2024-10-23 DIAGNOSIS — I49.5 SSS (SICK SINUS SYNDROME): Primary | ICD-10-CM

## 2024-10-23 DIAGNOSIS — E78.00 HYPERCHOLESTEREMIA: ICD-10-CM

## 2024-10-23 DIAGNOSIS — I10 ESSENTIAL HYPERTENSION: ICD-10-CM

## 2024-10-23 DIAGNOSIS — I48.0 PAF (PAROXYSMAL ATRIAL FIBRILLATION): ICD-10-CM

## 2024-10-23 DIAGNOSIS — I44.7 LBBB (LEFT BUNDLE BRANCH BLOCK): ICD-10-CM

## 2024-10-23 DIAGNOSIS — Z95.0 PRESENCE OF BIVENTRICULAR CARDIAC PACEMAKER: ICD-10-CM

## 2024-10-23 DIAGNOSIS — Z95.2 S/P AVR: ICD-10-CM

## 2024-10-23 DIAGNOSIS — I42.8 NICM (NONISCHEMIC CARDIOMYOPATHY): ICD-10-CM

## 2024-10-23 PROCEDURE — 99214 OFFICE O/P EST MOD 30 MIN: CPT | Performed by: NURSE PRACTITIONER

## 2024-10-23 PROCEDURE — 3075F SYST BP GE 130 - 139MM HG: CPT | Performed by: NURSE PRACTITIONER

## 2024-10-23 PROCEDURE — 93281 PM DEVICE PROGR EVAL MULTI: CPT | Performed by: INTERNAL MEDICINE

## 2024-10-23 PROCEDURE — 3078F DIAST BP <80 MM HG: CPT | Performed by: NURSE PRACTITIONER

## 2024-10-23 RX ORDER — IPRATROPIUM BROMIDE 21 UG/1
4 SPRAY, METERED NASAL 2 TIMES DAILY PRN
Qty: 30 ML | Refills: 2 | Status: SHIPPED | OUTPATIENT
Start: 2024-10-23

## 2024-10-23 NOTE — TELEPHONE ENCOUNTER
Xarelto was sent as sample so was unable to reach pharmacy, changed script to normal to be able to be sent.

## 2024-10-23 NOTE — PROGRESS NOTES
"Chief Complaint   Patient presents with    Follow-up     Cardiac management    Atrial Fibrillation     Patient reports she has not had symptoms of AFIB . She reports \" I am doing fine \"    Pacemaker Check     [place] device check today    LABS     August 2024 results in chart .    Med Refill     Request refills on  xarelto  90 day supply  and ipratropium nasal spray  to [facility]       Debbie Rodrigez is a 83 y.o. female who underwent aortic valve replacement 2017.  She developed a-fib, LV fx declined with LBBB.  Biventricular pacemaker placed.  Arrhythmia persist and she underwent AV ashley ablation along with right atrial isthmus ablation.  She is on anticoagulation therapy. On 9/7/2022 repeat echo showed LVEF stable at 51-55%.  Bioprosthetic aortic valve present with BRIE 1.2 cm² and pressure 13 over 20 mmHg.  RVSP was 38 mmHg.     Today she returns to the office for a pacemaker interrogation and follow-up visit. Per home monitor BP and heart rate log all normal in normal range.  Patient admits she feels she is \"doing pretty good\".    Cardiac History:    Past Surgical History:   Procedure Laterality Date    AORTIC VALVE REPAIR/REPLACEMENT N/A 11/10/2017    Procedure: AORTIC VALVE REPAIR/REPLACEMENT WITH MARYSE;  Surgeon: Dmitry Darling MD;  Location: [facility] OR;  Service:     CARDIAC CATHETERIZATION  09/11/2002    Cath- EF 30% Normal Coronaries    CARDIAC CATHETERIZATION  10/20/2017    Normal Coronaries. Sig AI. Unable to cross AV    CARDIAC ELECTROPHYSIOLOGY PROCEDURE N/A 09/07/2018    Procedure: Ablation SVT;  Surgeon: Benito Chavira MD;  Location: [facility] INVASIVE LOCATION;  Service: Cardiovascular    CARDIOVASCULAR STRESS TEST  05/22/2003    P. Myoview- Normal. Pt had CP    CARDIOVASCULAR STRESS TEST  03/12/2013    L. Myoview- Pt had CP. Negative    CONVERTED (HISTORICAL) CARDIOVASCULAR STUDIES  01/22/2014    MUGA- EF 45%    CONVERTED (HISTORICAL) HOLTER  10/10/2002    Holter- AVG " HR 70 BPM    ECHO - CONVERTED  09/15/2003    Echo- ([facility]) EF 55%. LBBB. RVSP- 42 mmHg    ECHO - CONVERTED  03/12/2013    Echo- EF 60%. BRIE- 1.6 mmHg. RVSP- 42 mmHg    ECHO - CONVERTED  10/15/2013    Echo-EF 35%, mod. MR, & AI. BIRE- 1.4 Cm2. RVSP- 40 mmHg    ECHO - CONVERTED  06/09/2015    Echo- EF 45-50%, mod MR, Mod AS, BRIE 1.1 Cm2, Mod AR, RVSP- 35 mmHg    ECHO - CONVERTED  02/21/2017    EF 55-60%, BRIE 1.0    ECHO - CONVERTED  12/15/2017    @[facility]. EF 35-40%. LBBB. Mild- Mod MR    ECHO - CONVERTED  05/01/2018    EF 45-50%. Mod MR. RVSP- 35 mmHg    ECHO - CONVERTED  07/22/2019    EF 50-55%.AVR. BRIE- 1.4 Cm2, Mild MR. RVSP- 29 mmHg.    ECHO - CONVERTED  07/15/2021    EF 55%. AVR. BRIE- 1.2 Cm2. Mild MR. LA- 3.9 Cm. RVSP- 36 mmHg    ECHO - CONVERTED  09/07/2022    EF 55%. LA- 4.1.AVR.BRIE- 1.2 .Trace-Mild MR. RVSP- 38 mmHg    PACEMAKER IMPLANTATION  12/19/2017    [place] BIV PPM by Dr. Dickey    MARYSE  09/06/2013    MARYSE- < 35%, Moderate AS. AI and MR. No Thrombus       Current Outpatient Medications   Medication Sig Dispense Refill    ipratropium (ATROVENT) 0.03 % nasal spray Administer 4 sprays into the nostril(s) as directed by provider 2 (Two) Times a Day As Needed for Rhinitis. 30 mL 2    rivaroxaban (XARELTO) 20 MG tablet Take 1 tablet by mouth Daily. 90 tablet 2    acetaminophen (TYLENOL) 325 MG tablet Take 2 tablets by mouth Every 6 (Six) Hours As Needed for Mild Pain.      aspirin 81 MG EC tablet Take 1 tablet by mouth Daily.      calcium carbonate (OS-PABLO) 600 MG tablet Take 1 tablet by mouth Daily. CALCIUM 600 MG AND VITAMIN D3 800 IU      carvedilol (COREG) 3.125 MG tablet Take 1 tablet by mouth 2 (Two) Times a Day. 180 tablet 3    Cyanocobalamin (VITAMIN B12 PO) 1,500 mcg. 1/2 tablet daily      docusate sodium (COLACE) 100 MG capsule Take 1 capsule by mouth 2 (Two) Times a Day As Needed.      ferrous sulfate 324 (65 Fe) MG tablet delayed-release EC tablet Take 1 tablet by mouth Daily With Breakfast.       furosemide (LASIX) 40 MG tablet Take 1 tablet by mouth Daily. 90 tablet 3    levothyroxine (SYNTHROID, LEVOTHROID) 88 MCG tablet Take 1 tablet by mouth Daily.      Multiple Vitamin (MULTI VITAMIN DAILY PO) Take 1 tablet by mouth Daily.      potassium chloride 10 MEQ CR tablet Take 1 tablet by mouth Daily. 90 tablet 3    pravastatin (PRAVACHOL) 40 MG tablet Take 1 tablet by mouth Daily. 90 tablet 3    risperiDONE (risperDAL) 4 MG tablet Take 0.5 tablets by mouth 2 (Two) Times a Day.      vitamin C (ASCORBIC ACID) 500 MG tablet Take 1 tablet by mouth Daily.       No current facility-administered medications for this visit.       Codeine, Penicillin v potassium, Sulfamethoxazole-trimethoprim, Penicillins, and Sulfa antibiotics    Past Medical History:   Diagnosis Date    Aortic valve stenosis     Bipolar affective disorder     CHF (congestive heart failure)     Diabetes mellitus     DX'D TYPE II NIDDM APPROX 3-4 YEARS AGO, NO MEDS, CHECKS BLOOD SUGAR 2-3 TIMES PER WEEK, AVERAGE     CLAYTON (generalized anxiety disorder)     H/O dilation and curettage     H/O eye surgery     Eye surgery- bilateral    H/O:      H/O: hysterectomy     s/p appendectomy    Hallucinations     History of AAA (abdominal aortic aneurysm) repair     (pt doesn't recall- on PCP note)    Hyperlipidemia     Hypertension     Hypothyroidism     Overactive bladder     Stroke     TIA 3 YEARS AGO, MEMORY LOSS     SVT (supraventricular tachycardia)     Tachycardia     Wears dentures     PARTIALS ON TOP AND BOTTOM     Wears glasses        Social History     Socioeconomic History    Marital status:     Number of children: 1   Tobacco Use    Smoking status: Never    Smokeless tobacco: Never   Vaping Use    Vaping status: Never Used   Substance and Sexual Activity    Alcohol use: Never    Drug use: No    Sexual activity: Defer       Family History   Problem Relation Age of Onset    Heart disease Mother     Diabetes Mother     Cancer Father   "       Bone       Review of Systems   Cardiovascular:  Negative for chest pain, irregular heartbeat, leg swelling, near-syncope and palpitations.   Respiratory:  Negative for shortness of breath.    Hematologic/Lymphatic: Negative for bleeding problem.        BP Readings from Last 5 Encounters:   10/23/24 132/70   04/09/24 132/60   02/22/24 146/52   09/13/23 126/68   02/28/23 140/76       Wt Readings from Last 5 Encounters:   10/23/24 52.7 kg (116 lb 3.2 oz)   04/09/24 55 kg (121 lb 3.2 oz)   02/22/24 54.9 kg (121 lb)   09/13/23 57.4 kg (126 lb 9.6 oz)   02/28/23 58.3 kg (128 lb 9.6 oz)       Objective     Labs 8/15/2024: WBC 5.55, RBC 4.32, H&H 13.1 and 40.9, platelets 235, sodium 138, potassium 3.9, chloride 103, carbon oxide 28, glucose 113, BUN 12, creatinine 0.8, GFR 74, total protein 6.9, BUN 4, calcium 10, total bili 0.6, AST 20, ALT 13, ALP 60, triglycerides 121, total cholesterol 152, HDL 46, LDL 92, TSH 0.677, free T41.82    /70 (BP Location: Right arm, Patient Position: Sitting)   Pulse 78   Ht 162.6 cm (64\")   Wt 52.7 kg (116 lb 3.2 oz)   BMI 19.95 kg/m²     Vitals and nursing note reviewed.   Constitutional:       Appearance: Healthy appearance. Not in distress.   Eyes:      Conjunctiva/sclera: Conjunctivae normal.      Pupils: Pupils are equal, round, and reactive to light.   HENT:      Head: Normocephalic.   Pulmonary:      Effort: Pulmonary effort is normal.      Breath sounds: Normal breath sounds.   Cardiovascular:      PMI at left midclavicular line. Normal rate. Regular rhythm.   Edema:     Peripheral edema absent.   Abdominal:      General: Bowel sounds are normal.      Palpations: Abdomen is soft.   Musculoskeletal: Normal range of motion.      Cervical back: Normal range of motion and neck supple. Skin:     General: Skin is warm and dry.   Neurological:      Mental Status: Alert, oriented to person, place, and time and oriented to person, place and time.          Procedures: " Pacemaker interrogation done today         Assessment & Plan   Diagnoses and all orders for this visit:    1. SSS (sick sinus syndrome) (Primary)    2. Presence of biventricular cardiac pacemaker    3. Essential hypertension    4. S/P AVR    5. LBBB (left bundle branch block)    6. NICM (nonischemic cardiomyopathy)    7. Hypercholesteremia    8. PAF (paroxysmal atrial fibrillation)  -     rivaroxaban (XARELTO) 20 MG tablet; Take 1 tablet by mouth Daily.  Dispense: 90 tablet; Refill: 2    Other orders  -     ipratropium (ATROVENT) 0.03 % nasal spray; Administer 4 sprays into the nostril(s) as directed by provider 2 (Two) Times a Day As Needed for Rhinitis.  Dispense: 30 mL; Refill: 2      SSS/BiV pacemaker/PAF/history AV node ablation  -In office interrogation done today showed normal function, 100% ventricular pacing and battery life 2 years remaining.  -Continue carvedilol  -For stroke prevention continue Xarelto, bleeding denied     HTN  -BP well-managed  -Continue carvedilol, Lasix     NICM/history aortic valve replacement  - Echo 9/2022 shows EF 55%  - Continue beta-blocker and loop diuretic  -Next visit we will plan to order repeat echocardiogram for surveillance.  Most recent 2021 stable     Hypercholesterolemia  - Continue pravastatin  -Will plan repeat labs next visit unless done sooner.     6-month follow-up visit scheduled.  Call sooner for concerns             Electronically signed by Jazmin Alston, TAYO,  October 23, 2024 12:52 EDT    Dictated Utilizing Dragon Dictation: Part of this note may be an electronic transcription/translation of spoken language to printed text using the Dragon Dictation System.

## 2024-10-23 NOTE — TELEPHONE ENCOUNTER
[facility] pharmacy called regarding ipratropium script.  4 sprays BID PRN exceeds amount allowed.  They said by history patient was on 1-2 sprays BID.

## 2024-10-23 NOTE — TELEPHONE ENCOUNTER
Samples of Xarelto given - Xarelto 15 mg  Lot # [id number]  Expiration # 02/28/2026  Xarelto 2.5 mg  Lot # [id number]  Expiration #  03/30/2026

## 2024-11-08 RX ORDER — IPRATROPIUM BROMIDE 21 UG/1
4 SPRAY, METERED NASAL 2 TIMES DAILY PRN
Qty: 30 ML | Refills: 2 | OUTPATIENT
Start: 2024-11-08

## 2024-11-08 NOTE — TELEPHONE ENCOUNTER
Caller: Debbie Rodrigez    Relationship: Self    Best call back number: [phone]     Requested Prescriptions:   Requested Prescriptions     Pending Prescriptions Disp Refills    ipratropium (ATROVENT) 0.03 % nasal spray 30 mL 2     Sig: Administer 4 sprays into the nostril(s) as directed by provider 2 (Two) Times a Day As Needed for Rhinitis.      Pharmacy where request should be sent: [place] [id number] - [phone] [facility] [phone]      Last office visit with prescribing clinician: 10/23/2024   Last telemedicine visit with prescribing clinician: Visit date not found   Next office visit with prescribing clinician: 5/14/2025     Additional details provided by patient: HAS 1-2 MONTHS, HAS TO GET THIS BEFORE IT RUNS OUT PER PT     Does the patient have less than a 3 day supply:  [] Yes  [x] No    Would you like a call back once the refill request has been completed: [] Yes [] No    If the office needs to give you a call back, can they leave a voicemail: [] Yes [] No    Christine Morales Rep   11/08/24 11:39 EST

## 2024-11-14 ENCOUNTER — TELEPHONE (OUTPATIENT)
Dept: CARDIOLOGY | Facility: CLINIC | Age: 84
End: 2024-11-14

## 2024-11-14 NOTE — TELEPHONE ENCOUNTER
Caller: Debbie Rodrigez    Relationship: Self    Best call back number: [phone] (home)     What is the best time to reach you: ANYTIME    Who are you requesting to speak with (clinical staff, provider,  specific staff member): CLINICAL    What was the call regarding: PT SAID YESSY HAS NOT RECEIVED THE PRESCRIPTION FOR ipratropium (ATROVENT) 0.03 % nasal spray YET. PLEASE CALL PT WHEN AVAILABLE.

## 2024-12-02 PROCEDURE — 93294 REM INTERROG EVL PM/LDLS PM: CPT | Performed by: INTERNAL MEDICINE

## 2024-12-02 PROCEDURE — 93296 REM INTERROG EVL PM/IDS: CPT | Performed by: INTERNAL MEDICINE

## 2024-12-13 ENCOUNTER — TELEPHONE (OUTPATIENT)
Dept: CARDIOLOGY | Facility: CLINIC | Age: 84
End: 2024-12-13
Payer: MEDICARE

## 2024-12-13 NOTE — TELEPHONE ENCOUNTER
Patient's , Bipin, called stating that patient was put on new medication divalproex  mg daily. He states that he was reading on information that it could causing bruising and bleeding. He was asking if it was safe to take with Xarelto since that can do the same thing, if they were similar drugs. I looked up in my drug guide regarding and I made him aware that they do not work the same way, so it may, but it is not a blood thinner. He was made aware to check with pharmacy regarding medications.

## 2024-12-17 NOTE — TELEPHONE ENCOUNTER
Agree to continue Xarelto. Can check with pharmacist regarding symtpoms to monitor etc. Continue routine labs.

## 2025-01-27 ENCOUNTER — TELEPHONE (OUTPATIENT)
Dept: CARDIOLOGY | Facility: CLINIC | Age: 85
End: 2025-01-27

## 2025-01-27 NOTE — TELEPHONE ENCOUNTER
Caller: Bipin Rodrigez    Relationship: Emergency Contact    Best call back number: [phone]    Which medication are you concerned about: XARELTO    Who prescribed you this medication: SIENNA BLANCA    When did you start taking this medication: 10.23.24    What are your concerns: PATIENT WAS TOLD THAT XARELTO WENT GENERIC AND NO MORE SAMPLES WILL BE PROVIDED, WENT TO PHARMACY, WHICH TOLD THEM THERE WAS NO GENERIC FOR XARELTO, NEEDS TO KNOW NAME OF GENERIC VERSION     How long have you had these concerns: 10TH OF JAN

## 2025-01-27 NOTE — TELEPHONE ENCOUNTER
I spoke with patient and offered information about Medicare Extra help, she said she would not qualify for that help and did not wish to receive information at this time. She is aware of prescription for Xarelto at [facility]  and will have filled when needed. She does currently have enough Xarelto to last until March 2025

## 2025-03-13 ENCOUNTER — TELEPHONE (OUTPATIENT)
Dept: CARDIOLOGY | Facility: CLINIC | Age: 85
End: 2025-03-13
Payer: MEDICARE

## 2025-03-13 NOTE — TELEPHONE ENCOUNTER
Patient called, states she was discharged from [facility] 3/12/25.  Discharge summary printed and scanned in, please see.  Admission 3/3/25-3/12/25 on behavioral health floor, admitted due to increasing paranoid, brought by ambulance.       She states her Lasix and potassium was stopped.    She does not want to stop her Lasix and potassium.    Per discharge summary lisinopril 2.5 mg daily was added.  She does not want to take the lisinopril because her BP is normal at home. She reports her BP at home this morning was 119/64 and 116/63.

## 2025-03-14 NOTE — TELEPHONE ENCOUNTER
Patient aware she can go back on her Lasix and potassium and discontinue lisinopril.  Patient verbalized understanding.

## 2025-05-05 DIAGNOSIS — I42.8 NICM (NONISCHEMIC CARDIOMYOPATHY): ICD-10-CM

## 2025-05-06 RX ORDER — FUROSEMIDE 40 MG/1
40 TABLET ORAL DAILY
Qty: 90 TABLET | Refills: 0 | Status: SHIPPED | OUTPATIENT
Start: 2025-05-06

## 2025-05-13 ENCOUNTER — TELEPHONE (OUTPATIENT)
Dept: CARDIOLOGY | Facility: CLINIC | Age: 85
End: 2025-05-13
Payer: MEDICARE

## 2025-05-13 NOTE — TELEPHONE ENCOUNTER
I  spoke with  patients  and patient is being placed in a long term care facility . He has asked what to do with  remote box she has for pacemaker. Could you help him with this ?

## 2025-05-13 NOTE — TELEPHONE ENCOUNTER
Caller: Bipin Rodrigez    Relationship to patient: Emergency Contact    Best call back number: [phone]    Patient is needing: THE ABOVE CANCELED THE PATIENT'S 5.14.2025 APPOINTMENTS VIA CONFIRMATION CALL. [facility] AND HE DOES NOT KNOW IF SHE WILL GET OUT. WANT SIENNA AWARE. HE HAS THE MONITOR AND NEEDS TO KNOW WHAT TO DO WITH IT. PLEASE CALL HIM ASAP TO ADVISE.

## 2025-05-13 NOTE — TELEPHONE ENCOUNTER
Called patient's  in regards to remote monitor. No answer, no voicemail. Called daughter Jeniffer Semyour, no answer. Left message on voicemail for Jeniffer Seymour.    In case they return call to office instead of calling me,  Monitor needs to be taken to nursing facility with patient to be hooked up there.

## 2025-05-14 NOTE — TELEPHONE ENCOUNTER
I left detailed message with instruction on remote monitor to be taken to facility the patient will be  going.

## 2025-06-01 DIAGNOSIS — E78.00 HYPERCHOLESTEREMIA: ICD-10-CM

## 2025-06-03 RX ORDER — PRAVASTATIN SODIUM 40 MG
40 TABLET ORAL DAILY
Qty: 90 TABLET | Refills: 0 | OUTPATIENT
Start: 2025-06-03

## 2025-06-05 ENCOUNTER — TELEPHONE (OUTPATIENT)
Dept: CARDIOLOGY | Facility: CLINIC | Age: 85
End: 2025-06-05
Payer: MEDICARE

## 2025-06-05 NOTE — TELEPHONE ENCOUNTER
Missed remote transmission. Called patient. Number for patient and spouse is not in service. Called Daughter at listed number, left message on voicemail for return call.

## 2025-06-26 PROCEDURE — 93296 REM INTERROG EVL PM/IDS: CPT | Performed by: INTERNAL MEDICINE

## 2025-06-26 PROCEDURE — 93294 REM INTERROG EVL PM/LDLS PM: CPT | Performed by: INTERNAL MEDICINE

## 2025-06-27 LAB
MC_CV_MDC_IDC_RATE_1: 160
MC_CV_MDC_IDC_ZONE_ID: 1
MDC_IDC_MSMT_BATTERY_REMAINING_LONGEVITY: 18 MO
MDC_IDC_MSMT_BATTERY_REMAINING_PERCENTAGE: 21 %
MDC_IDC_MSMT_BATTERY_STATUS: NORMAL
MDC_IDC_MSMT_LEADCHNL_LV_IMPEDANCE_VALUE: 761
MDC_IDC_MSMT_LEADCHNL_LV_PACING_THRESHOLD_POLARITY: NORMAL
MDC_IDC_MSMT_LEADCHNL_RA_DTM: NORMAL
MDC_IDC_MSMT_LEADCHNL_RA_IMPEDANCE_VALUE: 1530
MDC_IDC_MSMT_LEADCHNL_RA_PACING_THRESHOLD_POLARITY: NORMAL
MDC_IDC_MSMT_LEADCHNL_RA_SENSING_INTR_AMPL: 3.3
MDC_IDC_MSMT_LEADCHNL_RV_DTM: NORMAL
MDC_IDC_MSMT_LEADCHNL_RV_IMPEDANCE_VALUE: 718
MDC_IDC_MSMT_LEADCHNL_RV_PACING_THRESHOLD_POLARITY: NORMAL
MDC_IDC_PG_IMPLANT_DTM: NORMAL
MDC_IDC_PG_MFG: NORMAL
MDC_IDC_PG_MODEL: NORMAL
MDC_IDC_PG_SERIAL: NORMAL
MDC_IDC_PG_TYPE: NORMAL
MDC_IDC_SESS_DTM: NORMAL
MDC_IDC_SESS_TYPE: NORMAL
MDC_IDC_SET_BRADY_AT_MODE_SWITCH_RATE: 140
MDC_IDC_SET_BRADY_LOWRATE: 70
MDC_IDC_SET_BRADY_MAX_SENSOR_RATE: 120
MDC_IDC_SET_BRADY_MAX_TRACKING_RATE: 120
MDC_IDC_SET_BRADY_MODE: NORMAL
MDC_IDC_SET_BRADY_PAV_DELAY: 160
MDC_IDC_SET_BRADY_SAV_DELAY: 160
MDC_IDC_SET_CRT_LVRV_DELAY: 40
MDC_IDC_SET_CRT_PACED_CHAMBERS: NORMAL
MDC_IDC_SET_LEADCHNL_LV_PACING_AMPLITUDE: 2
MDC_IDC_SET_LEADCHNL_LV_PACING_PULSEWIDTH: 1
MDC_IDC_SET_LEADCHNL_RA_PACING_AMPLITUDE: 2
MDC_IDC_SET_LEADCHNL_RA_PACING_POLARITY: NORMAL
MDC_IDC_SET_LEADCHNL_RA_PACING_PULSEWIDTH: 0.4
MDC_IDC_SET_LEADCHNL_RA_SENSING_POLARITY: NORMAL
MDC_IDC_SET_LEADCHNL_RA_SENSING_SENSITIVITY: 0.25
MDC_IDC_SET_LEADCHNL_RV_PACING_AMPLITUDE: 2
MDC_IDC_SET_LEADCHNL_RV_PACING_POLARITY: NORMAL
MDC_IDC_SET_LEADCHNL_RV_PACING_PULSEWIDTH: 0.4
MDC_IDC_SET_LEADCHNL_RV_SENSING_POLARITY: NORMAL
MDC_IDC_SET_LEADCHNL_RV_SENSING_SENSITIVITY: 0.6
MDC_IDC_SET_ZONE_STATUS: NORMAL
MDC_IDC_SET_ZONE_TYPE: NORMAL
MDC_IDC_STAT_AT_BURDEN_PERCENT: 1
MDC_IDC_STAT_BRADY_RA_PERCENT_PACED: 17
MDC_IDC_STAT_BRADY_RV_PERCENT_PACED: 100
MDC_IDC_STAT_CRT_LV_PERCENT_PACED: 100

## 2025-07-14 DIAGNOSIS — I48.0 PAF (PAROXYSMAL ATRIAL FIBRILLATION): ICD-10-CM

## 2025-07-14 DIAGNOSIS — I10 ESSENTIAL HYPERTENSION: ICD-10-CM

## 2025-07-15 RX ORDER — CARVEDILOL 3.12 MG/1
3.12 TABLET ORAL 2 TIMES DAILY
Qty: 60 TABLET | Refills: 0 | OUTPATIENT
Start: 2025-07-15

## 2025-07-16 DIAGNOSIS — I10 ESSENTIAL HYPERTENSION: ICD-10-CM

## 2025-07-16 DIAGNOSIS — I48.0 PAF (PAROXYSMAL ATRIAL FIBRILLATION): ICD-10-CM

## 2025-07-16 RX ORDER — CARVEDILOL 3.12 MG/1
3.12 TABLET ORAL 2 TIMES DAILY
Qty: 180 TABLET | Refills: 0 | OUTPATIENT
Start: 2025-07-16

## (undated) DEVICE — Device: Brand: WEBSTER

## (undated) DEVICE — SUT PROLN 6/0 C1 D/A 30IN 8706H

## (undated) DEVICE — 2963 MEDIPORE SOFT CLOTH TAPE 3 IN X 10 YD 12 RLS/CS: Brand: 3M™ MEDIPORE™

## (undated) DEVICE — MEDI-VAC YANKAUER SUCTION HANDLE W/BULBOUS TIP: Brand: CARDINAL HEALTH

## (undated) DEVICE — Device: Brand: SMARTABLATE

## (undated) DEVICE — Device: Brand: THERMOCOOL SF NAV

## (undated) DEVICE — SUT PROLN 4/0 V7 36IN 8975H

## (undated) DEVICE — PRESSURE TUBING: Brand: TRUWAVE

## (undated) DEVICE — TONSIL SPONGES: Brand: DEROYAL

## (undated) DEVICE — SUT SILK 0/0 CT2 18IN C027D

## (undated) DEVICE — ST INF PRI SMRTSTE 20DRP 2VLV 24ML 117

## (undated) DEVICE — Device: Brand: MEDEX

## (undated) DEVICE — ST INF 10DRP 4 PORT 4WY/STPCOCK 112IN

## (undated) DEVICE — DRSNG SURESITE123 4X4.8IN

## (undated) DEVICE — OASIS DRAIN, SINGLE, INLINE & ATS COMPATIBLE: Brand: OASIS

## (undated) DEVICE — PRESSURE MONITORING ACCESSORY: Brand: TRUWAVE

## (undated) DEVICE — ANTIBACTERIAL UNDYED BRAIDED (POLYGLACTIN 910), SYNTHETIC ABSORBABLE SUTURE: Brand: COATED VICRYL

## (undated) DEVICE — CATH QUAD CRD 6F5MM

## (undated) DEVICE — GLV SURG SENSICARE W/ALOE PF LF 7 STRL

## (undated) DEVICE — MEDI-VAC NON-CONDUCTIVE SUCTION TUBING: Brand: CARDINAL HEALTH

## (undated) DEVICE — ST EXT IV SMARTSITE 2VLV SP M LL 5ML IV1

## (undated) DEVICE — ST BLOOD ADMIN YTP 80IN

## (undated) DEVICE — PROB TEMP MYOCARDIAL 18MM

## (undated) DEVICE — LEX ELECTRO PHYSIOLOGY: Brand: MEDLINE INDUSTRIES, INC.

## (undated) DEVICE — INTRAOPERATIVE COVER KIT, 10 PACK: Brand: SITE-RITE

## (undated) DEVICE — ADULT, W/LG. BACK PAD, RADIOTRANSPARENT ELEMENT AND LEAD WIRE: Brand: DEFIBRILLATION ELECTRODES

## (undated) DEVICE — SOL NS 500ML

## (undated) DEVICE — 12 FOOT DISPOSABLE EXTENSION CABLE WITH SAFE CONNECT / SCREW-DOWN

## (undated) DEVICE — CLTH CLENS READYCLEANSE PERI CARE PK/5

## (undated) DEVICE — SUT PROLN CARDIO V5 3/0 36IN 8936H

## (undated) DEVICE — SUT PROLN 3/0 V7 D/A 36IN 8976H

## (undated) DEVICE — SUT SILK 4/0 RB1CR8 18IN C054D

## (undated) DEVICE — SUT VIC 2 TP1 54IN J880T

## (undated) DEVICE — LIMB HOLDER, WRIST/ANKLE: Brand: DEROYAL

## (undated) DEVICE — Device

## (undated) DEVICE — INTRO SHEATH ENGAGE W/50 GW .038 7F12

## (undated) DEVICE — SOL NACL 0.9PCT 1000ML

## (undated) DEVICE — SKIN AFFIX SURG ADHESIVE 72/CS 0.55ML: Brand: MEDLINE

## (undated) DEVICE — SET PRIMARY GRVTY 10DP MALE LL 104IN

## (undated) DEVICE — CATH FOL LUBRISIL IC 2WY 20F 5CC

## (undated) DEVICE — DECANT BG O JET

## (undated) DEVICE — TUBING, SUCTION, 1/4" X 10', STRAIGHT: Brand: MEDLINE

## (undated) DEVICE — CATHETER,FOLEY,100%SILICONE,18FR,10ML,LF: Brand: MEDLINE

## (undated) DEVICE — SUCTION CANISTER, 2500CC, RIGID: Brand: DEROYAL

## (undated) DEVICE — PRESSURE MONITORING SET: Brand: TRUWAVE, VAMP

## (undated) DEVICE — BLAKE CARDIO CONNECTOR 1:1: Brand: J-VAC

## (undated) DEVICE — SUT SILK 2/0 CT1 CR8 18IN C022D

## (undated) DEVICE — SAFETY SCALPEL: Brand: DEROYAL

## (undated) DEVICE — PK HEART OPN 10

## (undated) DEVICE — ORGANIZER SUT GABBAY FRATER GFS10A PK/3

## (undated) DEVICE — INTRO SHEATH FASTCATH SAFL .038IN 8.5F 60CM

## (undated) DEVICE — CANN NASL CO2 DIVIDED A/

## (undated) DEVICE — TRAP,MUCUS SPECIMEN,40CC: Brand: MEDLINE

## (undated) DEVICE — SUT MNCRYL PLS ANTIB UD 4/0 PS2 18IN

## (undated) DEVICE — SUT SILK 2 SUTUPAK TIE 60IN SA8H 2STRAND

## (undated) DEVICE — DRSNG SURESITE WNDW 2.38X2.75

## (undated) DEVICE — SUT STL 2/0 CV SH 24IN TPW32

## (undated) DEVICE — SUT ETHIB 1 CT1 30IN  X425H

## (undated) DEVICE — SUT SILK 2/0 TIES 18IN A185H

## (undated) DEVICE — ANTIBACTERIAL VIOLET BRAIDED (POLYGLACTIN 910), SYNTHETIC ABSORBABLE SUTURE: Brand: COATED VICRYL

## (undated) DEVICE — CONNECTOR PH 6-IN-1 Y ST: Brand: CARDINAL HEALTH

## (undated) DEVICE — ST PRIM GRVTY NDLESS 3 INJ PORT 105IN

## (undated) DEVICE — DECANTER: Brand: UNBRANDED

## (undated) DEVICE — Device: Brand: REFERENCE PATCH CARTO 3